# Patient Record
Sex: FEMALE | Race: BLACK OR AFRICAN AMERICAN | Employment: UNEMPLOYED | URBAN - METROPOLITAN AREA
[De-identification: names, ages, dates, MRNs, and addresses within clinical notes are randomized per-mention and may not be internally consistent; named-entity substitution may affect disease eponyms.]

---

## 2017-08-02 ENCOUNTER — HOSPITAL ENCOUNTER (EMERGENCY)
Age: 22
Discharge: HOME | End: 2017-08-03
Attending: EMERGENCY MEDICINE
Payer: COMMERCIAL

## 2017-08-02 DIAGNOSIS — N89.8 DISCHARGE FROM THE VAGINA: Primary | ICD-10-CM

## 2017-08-02 PROCEDURE — 87210 SMEAR WET MOUNT SALINE/INK: CPT | Performed by: EMERGENCY MEDICINE

## 2017-08-02 PROCEDURE — 99284 EMERGENCY DEPT VISIT MOD MDM: CPT

## 2017-08-02 PROCEDURE — 96372 THER/PROPH/DIAG INJ SC/IM: CPT

## 2017-08-02 PROCEDURE — 74011250636 HC RX REV CODE- 250/636: Performed by: EMERGENCY MEDICINE

## 2017-08-02 PROCEDURE — 74011250637 HC RX REV CODE- 250/637: Performed by: EMERGENCY MEDICINE

## 2017-08-02 PROCEDURE — 87491 CHLMYD TRACH DNA AMP PROBE: CPT | Performed by: EMERGENCY MEDICINE

## 2017-08-02 PROCEDURE — 74011000250 HC RX REV CODE- 250: Performed by: EMERGENCY MEDICINE

## 2017-08-02 RX ORDER — AZITHROMYCIN 250 MG/1
1000 TABLET, FILM COATED ORAL
Status: COMPLETED | OUTPATIENT
Start: 2017-08-02 | End: 2017-08-02

## 2017-08-02 RX ADMIN — AZITHROMYCIN 1000 MG: 250 TABLET, FILM COATED ORAL at 23:46

## 2017-08-02 RX ADMIN — LIDOCAINE HYDROCHLORIDE 1 G: 10 INJECTION, SOLUTION EPIDURAL; INFILTRATION; INTRACAUDAL; PERINEURAL at 23:46

## 2017-08-02 NOTE — LETTER
8/4/2017 [address] [id number] Dear Ms. Talbot You were seen in the Emergency Department of [address] [place] [address] on 8/2/2017 and had lab and/or radiology tests performed. = 
 
The chlamydia from your Emergency Department visit on 8/2/2017 was positive. You were treated appropriately during your visit. No further treatment is required. Your partner needs to be treated. If you have any questions please contact the Emergency Department at [phone]. Sincerely, DEMETRICE Melendez. [address] 
[address] [place] [address] EMERGENCY DEPT 
[address] [id number] [phone]

## 2017-08-03 VITALS
BODY MASS INDEX: 26.31 KG/M2 | WEIGHT: 134 LBS | HEART RATE: 64 BPM | OXYGEN SATURATION: 99 % | RESPIRATION RATE: 16 BRPM | SYSTOLIC BLOOD PRESSURE: 156 MMHG | HEIGHT: 60 IN | DIASTOLIC BLOOD PRESSURE: 87 MMHG | TEMPERATURE: 97.8 F

## 2017-08-03 LAB
CLUE CELLS VAG QL WET PREP: NORMAL
KOH PREP SPEC: NORMAL
SERVICE CMNT-IMP: NORMAL
T VAGINALIS VAG QL WET PREP: NORMAL

## 2017-08-03 NOTE — ED NOTES
Emergency Department Nursing Plan of Care       The Nursing Plan of Care is developed from the Nursing assessment and Emergency Department Attending provider initial evaluation. The plan of care may be reviewed in the ED Provider note.     The Plan of Care was developed with the following considerations:   Patient / Family readiness to learn indicated by:verbalized understanding  Persons(s) to be included in education: patient  Barriers to Learning/Limitations:No    Signed     Ruth Todd RN    8/2/2017   11:22 PM

## 2017-08-03 NOTE — ED PROVIDER NOTES
HPI Comments: Diana Sanchez is a 25 y.o. female with no PMHx on file and no reported allergies or daily medications other than birth control who presents ambulatory to the ED c/o gradual onset of vaginal discharge, vaginal bleeding, and vaginal pain x 2 days. Pt reports a hx of chlamydia but denies any hx of GC or BV. She denies any chance of pregnancy and states that her LMP was 7/18/2017. Pt specifically denies nausea, vomiting, diarrhea, fever, chills, CP, or SOB. Social hx: + Tobacco use (0.25 ppd), - EtOH use, - Illicit drug use    PCP: None    There are no other complaints, changes or physical findings at this time. The history is provided by the patient. No  was used. Past Medical History:   Diagnosis Date    Chlamydia        History reviewed. No pertinent surgical history. History reviewed. No pertinent family history. Social History     Social History    Marital status: SINGLE     Spouse name: N/A    Number of children: N/A    Years of education: N/A     Occupational History    Not on file. Social History Main Topics    Smoking status: Current Every Day Smoker     Packs/day: 0.25    Smokeless tobacco: Never Used    Alcohol use No    Drug use: No    Sexual activity: Yes     Partners: Male     Birth control/ protection: IUD     Other Topics Concern    Not on file     Social History Narrative         ALLERGIES: Review of patient's allergies indicates no known allergies. Review of Systems   Constitutional: Negative for chills, diaphoresis, fever and unexpected weight change. HENT: Negative for congestion and sore throat. Eyes: Negative for discharge and visual disturbance. Respiratory: Negative for cough and shortness of breath. Cardiovascular: Negative for chest pain. Gastrointestinal: Negative for abdominal pain, diarrhea, nausea and vomiting. Endocrine: Negative for polydipsia, polyphagia and polyuria.    Genitourinary: Positive for vaginal bleeding, vaginal discharge and vaginal pain. Negative for dysuria and frequency. Musculoskeletal: Negative for arthralgias and neck pain. Skin: Negative for rash and wound. Allergic/Immunologic: Negative for environmental allergies, food allergies and immunocompromised state. Neurological: Negative for seizures, syncope and headaches. Hematological: Negative for adenopathy. Does not bruise/bleed easily. Psychiatric/Behavioral: Negative for behavioral problems, hallucinations and sleep disturbance. Vitals:    08/02/17 2315 08/03/17 0016   BP: (!) 165/117 156/87   Pulse: 67 64   Resp: 16    Temp: 97.8 °F (36.6 °C)    SpO2: 99%    Weight: 60.8 kg (134 lb)    Height: 5' (1.524 m)             Physical Exam   Constitutional: She is oriented to person, place, and time. She appears well-nourished. No distress. HENT:   Head: Normocephalic and atraumatic. Mouth/Throat: Oropharynx is clear and moist.   Eyes: Conjunctivae are normal. Right eye exhibits no discharge. Left eye exhibits no discharge. Neck: Neck supple. No tracheal deviation present. Cardiovascular: Normal rate and regular rhythm. Exam reveals no gallop and no friction rub. No murmur heard. Pulmonary/Chest: Breath sounds normal. No respiratory distress. She has no wheezes. She has no rales. Abdominal: Soft. Bowel sounds are normal. She exhibits no distension. There is no tenderness. Genitourinary:   Genitourinary Comments: Pelvic Exam Findings: No external lesions. Mild CMT. No adnexal mass or adnexal tenderness. Musculoskeletal: She exhibits no edema or tenderness. Lymphadenopathy:     She has no cervical adenopathy. Neurological: She is alert and oriented to person, place, and time. Skin: Skin is warm. No rash noted. She is not diaphoretic. Psychiatric: She has a normal mood and affect.  Thought content normal.        MDM  Number of Diagnoses or Management Options  Diagnosis management comments: DDx: JORDYN chlamydia, trichomonas, BV, yeast infection       Amount and/or Complexity of Data Reviewed  Clinical lab tests: ordered and reviewed  Review and summarize past medical records: yes    Patient Progress  Patient progress: stable    ED Course       Procedures    Progress Note:  11:12 PM  Pt accepts empiric STD treatment at this time. Written by Olvin Kearney ED Scribe, as dictated by Ivan Mayes MD.    Procedure Note - Pelvic Exam:    11:30 PM  Performed by: Ivan Mayes MD  Chaperoned by: Mark Brown RN  Pelvic exam was performed using bimanual and speculum. Further findings noted in physical exam.   The procedure took 1-15 minutes, and pt tolerated well. Written by Dee Dee Salcedo ED Scribe, as dictated by Ivan Mayes MD.    LABORATORY TESTS:  Recent Results (from the past 12 hour(s))   WET PREP    Collection Time: 08/02/17 11:53 PM   Result Value Ref Range    Clue cells CLUE CELLS ABSENT      Wet prep NO TRICHOMONAS SEEN     KOH, OTHER SOURCES    Collection Time: 08/02/17 11:53 PM   Result Value Ref Range    Special Requests: NO SPECIAL REQUESTS      KOH NO YEAST SEEN         MEDICATIONS GIVEN:  Medications   cefTRIAXone (ROCEPHIN) 1 g in lidocaine (PF) (XYLOCAINE) 10 mg/mL (1 %) IM injection (1 g IntraMUSCular Given 8/2/17 8206)   azithromycin (ZITHROMAX) tablet 1,000 mg (1,000 mg Oral Given 8/2/17 2346)       IMPRESSION:  1. Discharge from the vagina        PLAN:  1. Discharge Medication List as of 8/3/2017 12:33 AM        2. Follow-up Information     None        Return to ED if worse     DISCHARGE NOTE  12:35 AM  The patient has been re-evaluated and is ready for discharge. Reviewed available results with patient. Counseled pt on diagnosis and care plan. Pt has expressed understanding, and all questions have been answered. Pt agrees with plan and agrees to F/U as recommended, or return to the ED if their sxs worsen.  Discharge instructions have been provided and explained to the pt, along with reasons to return to the ED. This note is prepared by Lorrie Munroe, acting as Scribe for Naty Galaviz MD.    Naty Galaviz MD: The scribe's documentation has been prepared under my direction and personally reviewed by me in its entirety. I confirm that the note above accurately reflects all work, treatment, procedures, and medical decision making performed by me.

## 2017-08-03 NOTE — ED TRIAGE NOTES
Pt arrived to ED with c/o vaginal discharge X a few days. Pt reports a hx of chlamydia and was treated. Pt is alert and orientated X 4; skin is intact; lungs are clear; pt breaths well on room air; Pt is in no acute distress. Will continue to monitor. See nursing assessment. Safety precautions in place; call light within reach.

## 2017-08-03 NOTE — ED NOTES
Patient given copy of dc instructions and 0 script(s). Patient  verbalized understanding of instructions and script (s). Patient given a current medication reconciliation form and verbalized understanding of their medications. Patient verbalized understanding of the importance of discussing medications with  his or her physician or clinic they will be following up with. Patient alert and oriented and in no acute distress. Patient discharged home ambulatory.

## 2017-08-04 LAB
C TRACH DNA SPEC QL NAA+PROBE: POSITIVE
N GONORRHOEA DNA SPEC QL NAA+PROBE: NEGATIVE
SAMPLE TYPE: ABNORMAL
SERVICE CMNT-IMP: ABNORMAL
SPECIMEN SOURCE: ABNORMAL

## 2017-09-03 ENCOUNTER — HOSPITAL ENCOUNTER (EMERGENCY)
Age: 22
Discharge: HOME | End: 2017-09-03
Attending: EMERGENCY MEDICINE | Admitting: EMERGENCY MEDICINE
Payer: COMMERCIAL

## 2017-09-03 VITALS
BODY MASS INDEX: 26.31 KG/M2 | TEMPERATURE: 98.2 F | DIASTOLIC BLOOD PRESSURE: 84 MMHG | SYSTOLIC BLOOD PRESSURE: 124 MMHG | HEART RATE: 81 BPM | HEIGHT: 60 IN | WEIGHT: 134 LBS | RESPIRATION RATE: 14 BRPM | OXYGEN SATURATION: 99 %

## 2017-09-03 DIAGNOSIS — T78.40XA ALLERGIC REACTION, INITIAL ENCOUNTER: Primary | ICD-10-CM
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
## 2017-09-03 PROCEDURE — 99283 EMERGENCY DEPT VISIT LOW MDM: CPT

## 2017-09-03 RX ORDER — HYDROCORTISONE 1 %
CREAM (GRAM) TOPICAL 2 TIMES DAILY
Qty: 30 G | Refills: 0 | Status: SHIPPED | OUTPATIENT
Start: 2017-09-03 | End: 2018-11-16

## 2017-09-03 RX ORDER — DIPHENHYDRAMINE HCL 25 MG
25 CAPSULE ORAL
Qty: 20 CAP | Refills: 0 | Status: SHIPPED | OUTPATIENT
Start: 2017-09-03 | End: 2017-09-13

## 2017-09-03 NOTE — DISCHARGE INSTRUCTIONS
Allergic Reaction: Care Instructions  Your Care Instructions  An allergic reaction is an excessive response from your immune system to a medicine, chemical, food, insect bite, or other substance. A reaction can range from mild to life-threatening. Some people have a mild rash, hives, and itching or stomach cramps. In severe reactions, swelling of your tongue and throat can close up your airway so that you cannot breathe. Follow-up care is a key part of your treatment and safety. Be sure to make and go to all appointments, and call your doctor if you are having problems. It's also a good idea to know your test results and keep a list of the medicines you take. How can you care for yourself at home? · If you know what caused your allergic reaction, be sure to avoid it. Your allergy may become more severe each time you have a reaction. · Take an over-the-counter antihistamine, such as cetirizine (Zyrtec) or loratadine (Claritin), to treat mild symptoms. Read and follow directions on the label. Some antihistamines can make you feel sleepy. Do not give antihistamines to a child unless you have checked with your doctor first. Mild symptoms include sneezing or an itchy or runny nose; an itchy mouth; a few hives or mild itching; and mild nausea or stomach discomfort. · Do not scratch hives or a rash. Put a cold, moist towel on them or take cool baths to relieve itching. Put ice packs on hives, swelling, or insect stings for 10 to 15 minutes at a time. Put a thin cloth between the ice pack and your skin. Do not take hot baths or showers. They will make the itching worse. · Your doctor may prescribe a shot of epinephrine to carry with you in case you have a severe reaction. Learn how to give yourself the shot and keep it with you at all times. Make sure it is not . · Go to the emergency room every time you have a severe reaction, even if you have used your shot of epinephrine and are feeling better. Symptoms can come back after a shot. · Wear medical alert jewelry that lists your allergies. You can buy this at most drugstores. · If your child has a severe allergy, make sure that his or her teachers, babysitters, coaches, and other caregivers know about the allergy. They should have an epinephrine shot, know how and when to give it, and have a plan to take your child to the hospital.  When should you call for help? Give an epinephrine shot if:  · You think you are having a severe allergic reaction. · You have symptoms in more than one body area, such as mild nausea and an itchy mouth. After giving an epinephrine shot call 911, even if you feel better. Call 911 if:  · You have symptoms of a severe allergic reaction. These may include:  ¨ Sudden raised, red areas (hives) all over your body. ¨ Swelling of the throat, mouth, lips, or tongue. ¨ Trouble breathing. ¨ Passing out (losing consciousness). Or you may feel very lightheaded or suddenly feel weak, confused, or restless. · You have been given an epinephrine shot, even if you feel better. Call your doctor now or seek immediate medical care if:  · You have symptoms of an allergic reaction, such as:  ¨ A rash or hives (raised, red areas on the skin). ¨ Itching. ¨ Swelling. ¨ Belly pain, nausea, or vomiting. Watch closely for changes in your health, and be sure to contact your doctor if:  · You do not get better as expected. Where can you learn more? Go to http://byron-isai.info/. Enter A175 in the search box to learn more about \"Allergic Reaction: Care Instructions. \"  Current as of: April 3, 2017  Content Version: 11.3  © 1699-3752 [employer]. Care instructions adapted under license by [employer] (which disclaims liability or warranty for this information).  If you have questions about a medical condition or this instruction, always ask your healthcare professional. [address]  any warranty or liability for your use of this information. Insect Stings and Bites: Care Instructions  Your Care Instructions  Stings and bites from bees, wasps, ants, and other insects often cause pain, swelling, redness, and itching. In some people, especially children, the redness and swelling may be worse. It may extend several inches beyond the affected area. But in most cases, stings and bites don't cause reactions all over the body. If you have had a reaction to an insect sting or bite, you are at risk for a reaction if you get stung or bitten again. Follow-up care is a key part of your treatment and safety. Be sure to make and go to all appointments, and call your doctor if you are having problems. It's also a good idea to know your test results and keep a list of the medicines you take. How can you care for yourself at home? · Do not scratch or rub the skin where the sting or bite occurred. · Put a cold pack or ice cube on the area. Put a thin cloth between the ice and your skin. For some people, a paste of baking soda mixed with a little water helps relieve pain and decrease the reaction. · Take an over-the-counter antihistamine, such as diphenhydramine (Benadryl) or loratadine (Claritin), to relieve swelling, redness, and itching. Calamine lotion or hydrocortisone cream may also help. Do not give antihistamines to your child unless you have checked with the doctor first.  · Be safe with medicines. If your doctor prescribed medicine for your allergy, take it exactly as prescribed. Call your doctor if you think you are having a problem with your medicine. You will get more details on the specific medicines your doctor prescribes. · Your doctor may prescribe a shot of epinephrine to carry with you in case you have a severe reaction. Learn how and when to give yourself the shot, and keep it with you at all times. Make sure it has not .   · Go to the emergency room anytime you have a severe reaction. Go even if you have given yourself epinephrine and are feeling better. Symptoms can come back. When should you call for help? Call 911 anytime you think you may need emergency care. For example, call if:  · You have symptoms of a severe allergic reaction. These may include:  ¨ Sudden raised, red areas (hives) all over your body. ¨ Swelling of the throat, mouth, lips, or tongue. ¨ Trouble breathing. ¨ Passing out (losing consciousness). Or you may feel very lightheaded or suddenly feel weak, confused, or restless. Call your doctor now or seek immediate medical care if:  · You have symptoms of an allergic reaction not right at the sting or bite, such as:  ¨ A rash or small area of hives (raised, red areas on the skin). ¨ Itching. ¨ Swelling. ¨ Belly pain, nausea, or vomiting. · You have a lot of swelling around the site (such as your entire arm or leg is swollen). · You have signs of infection, such as:  ¨ Increased pain, swelling, redness, or warmth around the sting. ¨ Red streaks leading from the area. ¨ Pus draining from the sting. ¨ A fever. Watch closely for changes in your health, and be sure to contact your doctor if:  · You do not get better as expected. Where can you learn more? Go to http://byron-isai.info/. Enter P390 in the search box to learn more about \"Insect Stings and Bites: Care Instructions. \"  Current as of: March 20, 2017  Content Version: 11.3  © 0275-6721 [employer]. Care instructions adapted under license by [employer] (which disclaims liability or warranty for this information). If you have questions about a medical condition or this instruction, always ask your healthcare professional. [place] [address] any warranty or liability for your use of this information.

## 2017-09-03 NOTE — ED PROVIDER NOTES
Patient is a 25 y.o. female presenting with Insect Bite. The history is provided by the patient. No  was used. Insect Bite   This is a new problem. The current episode started less than 1 hour ago. The problem occurs constantly. The problem has not changed since onset. Pertinent negatives include no chest pain, no abdominal pain, no headaches and no shortness of breath. Nothing aggravates the symptoms. Nothing relieves the symptoms. She has tried nothing for the symptoms. Past Medical History:   Diagnosis Date    Chlamydia        History reviewed. No pertinent surgical history. History reviewed. No pertinent family history. Social History     Social History    Marital status: SINGLE     Spouse name: N/A    Number of children: N/A    Years of education: N/A     Occupational History    Not on file. Social History Main Topics    Smoking status: Current Every Day Smoker     Packs/day: 0.25    Smokeless tobacco: Never Used    Alcohol use No    Drug use: No    Sexual activity: Yes     Partners: Male     Birth control/ protection: IUD     Other Topics Concern    Not on file     Social History Narrative         ALLERGIES: Review of patient's allergies indicates no known allergies. Review of Systems   Constitutional: Negative for fatigue and fever. Respiratory: Negative for shortness of breath and wheezing. Cardiovascular: Negative for chest pain and palpitations. Gastrointestinal: Negative for abdominal pain. Musculoskeletal: Negative for arthralgias, myalgias, neck pain and neck stiffness. Skin: Positive for rash. Negative for pallor. Neurological: Negative for dizziness, tremors, weakness and headaches. Hematological: Negative for adenopathy. Psychiatric/Behavioral: Negative for agitation and behavioral problems. All other systems reviewed and are negative.       Vitals:    09/03/17 1400   BP: 124/84   Pulse: 81   Resp: 14   Temp: 98.2 °F (36.8 °C) SpO2: 99%   Weight: 60.8 kg (134 lb)   Height: 5' (1.524 m)            Physical Exam   Constitutional: She is oriented to person, place, and time. She appears well-developed and well-nourished. No distress. HENT:   Head: Normocephalic and atraumatic. Right Ear: External ear normal.   Left Ear: External ear normal.   Nose: Nose normal.   Eyes: Conjunctivae are normal.   Neck: Normal range of motion. Neck supple. Cardiovascular: Normal rate and regular rhythm. Pulmonary/Chest: Effort normal and breath sounds normal. No respiratory distress. She has no wheezes. Abdominal: Soft. Bowel sounds are normal. There is no tenderness. Musculoskeletal: Normal range of motion. Lymphadenopathy:     She has no cervical adenopathy. Neurological: She is alert and oriented to person, place, and time. No cranial nerve deficit. Coordination normal.   Skin: Skin is warm and dry. No rash noted. Psychiatric: She has a normal mood and affect. Her behavior is normal. Judgment and thought content normal.   Nursing note and vitals reviewed. MDM  Number of Diagnoses or Management Options  Allergic reaction, initial encounter:   Insect bite, initial encounter:   Diagnosis management comments: DDX insect bite dermatitis       Amount and/or Complexity of Data Reviewed  Discuss the patient with other providers: yes      ED Course       Procedures    Pt has been reevaluated. There are no new complaints, changes, or physical findings at this time. Medications have been reviewed w/ pt and/or family. Pt and/or family's questions have been answered. Pt and/or family expressed good understanding of the dx/tx/rx and is in agreement with plan of care. Pt instructed and agreed to f/u w/ PCP and to return to ED upon further deterioration. Pt is ready for discharge. LABORATORY TESTS:  No results found for this or any previous visit (from the past 12 hour(s)).     IMAGING RESULTS:  No orders to display     No results found.      MEDICATIONS GIVEN:  Medications - No data to display    IMPRESSION:  1. Allergic reaction, initial encounter    2. Insect bite, initial encounter        PLAN:  1. Discharge Medication List as of 9/3/2017  2:40 PM      START taking these medications    Details   diphenhydrAMINE (BENADRYL) 25 mg capsule Take 1 Cap by mouth every six (6) hours as needed for up to 10 days. , Normal, Disp-20 Cap, R-0      hydrocortisone (CORTAID) 1 % topical cream Apply  to affected area two (2) times a day. use thin layer, Normal, Disp-30 g, R-0         CONTINUE these medications which have NOT CHANGED    Details   cyclobenzaprine (FLEXERIL) 10 mg tablet Take 1 tablet by mouth three (3) times daily as needed for Muscle Spasm(s). , Print, Disp-12 tablet, R-0      ibuprofen (MOTRIN) 600 mg tablet Take 1 tablet by mouth every six (6) hours as needed for Pain., Print, Disp-20 tablet, R-0           2.    Follow-up Information     Follow up With Details Comments Contact Info    [facility] In 2 days  [address] [facility] [id number]              Return to ED if worse

## 2017-09-03 NOTE — ED NOTES
Patient presents to ED via EMS for c/o left inner thigh pain after being stung by unknown insect. No redness or induration noted to area of pain. No hx of anaphylaxis to bee stings or wasps. Skin warm and dry to touch. Emergency Department Nursing Plan of Care       The Nursing Plan of Care is developed from the Nursing assessment and Emergency Department Attending provider initial evaluation. The plan of care may be reviewed in the ED Provider note.     The Plan of Care was developed with the following considerations:   Patient / Family readiness to learn indicated by:verbalized understanding  Persons(s) to be included in education: patient  Barriers to Learning/Limitations:No    Signed     Maame Pinto RN    9/3/2017   2:39 PM

## 2017-09-20 ENCOUNTER — HOSPITAL ENCOUNTER (EMERGENCY)
Age: 22
Discharge: HOME | End: 2017-09-21
Attending: EMERGENCY MEDICINE
Payer: COMMERCIAL

## 2017-09-20 DIAGNOSIS — R11.2 NON-INTRACTABLE VOMITING WITH NAUSEA, UNSPECIFIED VOMITING TYPE: Primary | ICD-10-CM

## 2017-09-20 DIAGNOSIS — N30.00 ACUTE CYSTITIS WITHOUT HEMATURIA: ICD-10-CM

## 2017-09-20 LAB
ANION GAP SERPL CALC-SCNC: 12 MMOL/L (ref 5–15)
BASOPHILS # BLD: 0 K/UL (ref 0–0.1)
BASOPHILS NFR BLD: 0 % (ref 0–1)
BUN SERPL-MCNC: 10 MG/DL (ref 6–20)
BUN/CREAT SERPL: 14 (ref 12–20)
CALCIUM SERPL-MCNC: 10 MG/DL (ref 8.5–10.1)
CHLORIDE SERPL-SCNC: 105 MMOL/L (ref 97–108)
CO2 SERPL-SCNC: 26 MMOL/L (ref 21–32)
CREAT SERPL-MCNC: 0.72 MG/DL (ref 0.55–1.02)
EOSINOPHIL # BLD: 0.1 K/UL (ref 0–0.4)
EOSINOPHIL NFR BLD: 1 % (ref 0–7)
ERYTHROCYTE [DISTWIDTH] IN BLOOD BY AUTOMATED COUNT: 12.5 % (ref 11.5–14.5)
GLUCOSE SERPL-MCNC: 87 MG/DL (ref 65–100)
HCG SERPL QL: NEGATIVE
HCT VFR BLD AUTO: 42.2 % (ref 35–47)
HGB BLD-MCNC: 13.9 G/DL (ref 11.5–16)
LYMPHOCYTES # BLD: 2 K/UL (ref 0.8–3.5)
LYMPHOCYTES NFR BLD: 23 % (ref 12–49)
MCH RBC QN AUTO: 29.4 PG (ref 26–34)
MCHC RBC AUTO-ENTMCNC: 32.9 G/DL (ref 30–36.5)
MCV RBC AUTO: 89.4 FL (ref 80–99)
MONOCYTES # BLD: 0.7 K/UL (ref 0–1)
MONOCYTES NFR BLD: 9 % (ref 5–13)
NEUTS SEG # BLD: 5.8 K/UL (ref 1.8–8)
NEUTS SEG NFR BLD: 67 % (ref 32–75)
PLATELET # BLD AUTO: 286 K/UL (ref 150–400)
POTASSIUM SERPL-SCNC: 3.4 MMOL/L (ref 3.5–5.1)
RBC # BLD AUTO: 4.72 M/UL (ref 3.8–5.2)
SODIUM SERPL-SCNC: 143 MMOL/L (ref 136–145)
WBC # BLD AUTO: 8.6 K/UL (ref 3.6–11)

## 2017-09-20 PROCEDURE — 81001 URINALYSIS AUTO W/SCOPE: CPT | Performed by: EMERGENCY MEDICINE

## 2017-09-20 PROCEDURE — 96361 HYDRATE IV INFUSION ADD-ON: CPT

## 2017-09-20 PROCEDURE — 74011250636 HC RX REV CODE- 250/636: Performed by: EMERGENCY MEDICINE

## 2017-09-20 PROCEDURE — 80307 DRUG TEST PRSMV CHEM ANLYZR: CPT | Performed by: EMERGENCY MEDICINE

## 2017-09-20 PROCEDURE — 87147 CULTURE TYPE IMMUNOLOGIC: CPT | Performed by: EMERGENCY MEDICINE

## 2017-09-20 PROCEDURE — 36415 COLL VENOUS BLD VENIPUNCTURE: CPT | Performed by: EMERGENCY MEDICINE

## 2017-09-20 PROCEDURE — 85025 COMPLETE CBC W/AUTO DIFF WBC: CPT | Performed by: EMERGENCY MEDICINE

## 2017-09-20 PROCEDURE — 96374 THER/PROPH/DIAG INJ IV PUSH: CPT

## 2017-09-20 PROCEDURE — 84703 CHORIONIC GONADOTROPIN ASSAY: CPT | Performed by: EMERGENCY MEDICINE

## 2017-09-20 PROCEDURE — 87086 URINE CULTURE/COLONY COUNT: CPT | Performed by: EMERGENCY MEDICINE

## 2017-09-20 PROCEDURE — 99284 EMERGENCY DEPT VISIT MOD MDM: CPT

## 2017-09-20 PROCEDURE — 80048 BASIC METABOLIC PNL TOTAL CA: CPT | Performed by: EMERGENCY MEDICINE

## 2017-09-20 RX ORDER — SODIUM CHLORIDE 0.9 % (FLUSH) 0.9 %
5-10 SYRINGE (ML) INJECTION EVERY 8 HOURS
Status: DISCONTINUED | OUTPATIENT
Start: 2017-09-20 | End: 2017-09-21 | Stop reason: HOSPADM

## 2017-09-20 RX ORDER — SODIUM CHLORIDE 0.9 % (FLUSH) 0.9 %
5-10 SYRINGE (ML) INJECTION AS NEEDED
Status: DISCONTINUED | OUTPATIENT
Start: 2017-09-20 | End: 2017-09-21 | Stop reason: HOSPADM

## 2017-09-20 RX ORDER — ONDANSETRON 2 MG/ML
4 INJECTION INTRAMUSCULAR; INTRAVENOUS
Status: COMPLETED | OUTPATIENT
Start: 2017-09-20 | End: 2017-09-20

## 2017-09-20 RX ADMIN — ONDANSETRON 4 MG: 2 SOLUTION INTRAMUSCULAR; INTRAVENOUS at 22:38

## 2017-09-20 RX ADMIN — SODIUM CHLORIDE 1000 ML: 900 INJECTION, SOLUTION INTRAVENOUS at 22:38

## 2017-09-21 VITALS
SYSTOLIC BLOOD PRESSURE: 133 MMHG | RESPIRATION RATE: 16 BRPM | HEART RATE: 84 BPM | TEMPERATURE: 98.6 F | BODY MASS INDEX: 24.15 KG/M2 | WEIGHT: 123 LBS | HEIGHT: 60 IN | DIASTOLIC BLOOD PRESSURE: 71 MMHG | OXYGEN SATURATION: 100 %

## 2017-09-21 LAB
AMPHET UR QL SCN: NEGATIVE
APPEARANCE UR: ABNORMAL
BACTERIA URNS QL MICRO: ABNORMAL /HPF
BARBITURATES UR QL SCN: NEGATIVE
BENZODIAZ UR QL: NEGATIVE
BILIRUB UR QL: NEGATIVE
CANNABINOIDS UR QL SCN: NEGATIVE
CAOX CRY URNS QL MICRO: ABNORMAL
COCAINE UR QL SCN: NEGATIVE
COLOR UR: ABNORMAL
DRUG SCRN COMMENT,DRGCM: NORMAL
EPITH CASTS URNS QL MICRO: ABNORMAL /LPF
GLUCOSE UR STRIP.AUTO-MCNC: NEGATIVE MG/DL
HGB UR QL STRIP: NEGATIVE
KETONES UR QL STRIP.AUTO: 15 MG/DL
LEUKOCYTE ESTERASE UR QL STRIP.AUTO: ABNORMAL
METHADONE UR QL: NEGATIVE
NITRITE UR QL STRIP.AUTO: NEGATIVE
OPIATES UR QL: NEGATIVE
PCP UR QL: NEGATIVE
PH UR STRIP: 5.5 [PH] (ref 5–8)
PROT UR STRIP-MCNC: ABNORMAL MG/DL
RBC #/AREA URNS HPF: ABNORMAL /HPF (ref 0–5)
SP GR UR REFRACTOMETRY: >1.03 (ref 1–1.03)
UA: UC IF INDICATED,UAUC: ABNORMAL
UROBILINOGEN UR QL STRIP.AUTO: 0.2 EU/DL (ref 0.2–1)
WBC URNS QL MICRO: ABNORMAL /HPF (ref 0–4)

## 2017-09-21 RX ORDER — ONDANSETRON 4 MG/1
4 TABLET, ORALLY DISINTEGRATING ORAL
Qty: 10 TAB | Refills: 0 | Status: SHIPPED | OUTPATIENT
Start: 2017-09-21 | End: 2018-11-16

## 2017-09-21 RX ORDER — CIPROFLOXACIN 500 MG/1
500 TABLET ORAL 2 TIMES DAILY
Qty: 10 TAB | Refills: 0 | Status: SHIPPED | OUTPATIENT
Start: 2017-09-21 | End: 2017-09-26

## 2017-09-21 NOTE — ED PROVIDER NOTES
HPI Comments: Margaret John is a 25 y.o. female with PMhx significant for depression who presents via EMS to the ED with cc of persistent abdominal pain which started 4 days ago. Pt also c/o nausea, vomiting, and diarrhea for the past four days. Per EMS, patient has not had fluids in 3 days. They were unable to start a line because her veins were flat secondary to dehydration. She denies taking any antibiotics recently. Pt explains that she took her depression medication four days ago and her stomach started to hurt. She denies taking her medications on an empty stomach. EMS also explains that she had a near syncopal episode with an episode of vomiting, but she did not lose consciousness. Pt denies fever, chills, vaginal bleeding, vaginal discharge, or difficulty urinating. She does not have regular periods due to her IUD. Social Hx: - Tobacco, - EtOH, - Illicit Drugs    PCP: None    There are no other complaints, changes or physical findings at this time. The history is provided by the patient and the EMS personnel. Past Medical History:   Diagnosis Date    Chlamydia        History reviewed. No pertinent surgical history. History reviewed. No pertinent family history. Social History     Social History    Marital status: SINGLE     Spouse name: N/A    Number of children: N/A    Years of education: N/A     Occupational History    Not on file. Social History Main Topics    Smoking status: Current Every Day Smoker     Packs/day: 0.25    Smokeless tobacco: Never Used    Alcohol use No    Drug use: No    Sexual activity: Yes     Partners: Male     Birth control/ protection: IUD     Other Topics Concern    Not on file     Social History Narrative         ALLERGIES: Review of patient's allergies indicates no known allergies. Review of Systems   Constitutional: Negative for chills and fever. HENT: Negative for sore throat. Eyes: Negative for photophobia and redness. Respiratory: Negative for shortness of breath and wheezing. Cardiovascular: Negative for chest pain and leg swelling. Gastrointestinal: Positive for abdominal pain, diarrhea, nausea and vomiting. Negative for blood in stool. Genitourinary: Negative for difficulty urinating, dysuria, hematuria, menstrual problem, vaginal bleeding and vaginal discharge. Musculoskeletal: Negative for back pain and joint swelling. Neurological: Negative for dizziness, seizures, syncope, speech difficulty, weakness, numbness and headaches. Hematological: Negative for adenopathy. Psychiatric/Behavioral: Negative for agitation, confusion and suicidal ideas. The patient is not nervous/anxious. Vitals:    09/20/17 2202   BP: 135/89   Pulse: 84   Resp: 16   Temp: 98.6 °F (37 °C)   SpO2: 100%   Weight: 55.8 kg (123 lb)   Height: 5' (1.524 m)            Physical Exam   Constitutional: She is oriented to person, place, and time. She appears well-developed and well-nourished. No distress. HENT:   Head: Normocephalic and atraumatic. Mouth/Throat: Oropharynx is clear and moist. No oropharyngeal exudate. Eyes: Conjunctivae and EOM are normal. Pupils are equal, round, and reactive to light. Left eye exhibits no discharge. Neck: Normal range of motion. Neck supple. No JVD present. Cardiovascular: Normal rate, regular rhythm, normal heart sounds and intact distal pulses. Pulmonary/Chest: Effort normal and breath sounds normal. No respiratory distress. She has no wheezes. Abdominal: Soft. Bowel sounds are normal. She exhibits no distension. There is no tenderness. There is no rebound and no guarding. Abdomen exam is benign. Musculoskeletal: Normal range of motion. She exhibits no edema or tenderness. Lymphadenopathy:     She has no cervical adenopathy. Neurological: She is alert and oriented to person, place, and time. She has normal reflexes. No cranial nerve deficit. Skin: Skin is warm and dry.  No rash noted.   Psychiatric: She has a normal mood and affect. Her behavior is normal.   Nursing note and vitals reviewed. MDM  Number of Diagnoses or Management Options  Diagnosis management comments:   DDx: gastroenteritis, dehydration, UTI       Amount and/or Complexity of Data Reviewed  Clinical lab tests: ordered and reviewed  Obtain history from someone other than the patient: yes (EMS  )  Review and summarize past medical records: yes    Patient Progress  Patient progress: stable    ED Course       Procedures     PROGRESS NOTE:  11:00 PM  Pt has been re-evaluated. She states she is feeling better. LABORATORY TESTS:  Recent Results (from the past 12 hour(s))   CBC WITH AUTOMATED DIFF    Collection Time: 09/20/17 10:24 PM   Result Value Ref Range    WBC 8.6 3.6 - 11.0 K/uL    RBC 4.72 3.80 - 5.20 M/uL    HGB 13.9 11.5 - 16.0 g/dL    HCT 42.2 35.0 - 47.0 %    MCV 89.4 80.0 - 99.0 FL    MCH 29.4 26.0 - 34.0 PG    MCHC 32.9 30.0 - 36.5 g/dL    RDW 12.5 11.5 - 14.5 %    PLATELET 002 287 - 994 K/uL    NEUTROPHILS 67 32 - 75 %    LYMPHOCYTES 23 12 - 49 %    MONOCYTES 9 5 - 13 %    EOSINOPHILS 1 0 - 7 %    BASOPHILS 0 0 - 1 %    ABS. NEUTROPHILS 5.8 1.8 - 8.0 K/UL    ABS. LYMPHOCYTES 2.0 0.8 - 3.5 K/UL    ABS. MONOCYTES 0.7 0.0 - 1.0 K/UL    ABS. EOSINOPHILS 0.1 0.0 - 0.4 K/UL    ABS.  BASOPHILS 0.0 0.0 - 0.1 K/UL   METABOLIC PANEL, BASIC    Collection Time: 09/20/17 10:24 PM   Result Value Ref Range    Sodium 143 136 - 145 mmol/L    Potassium 3.4 (L) 3.5 - 5.1 mmol/L    Chloride 105 97 - 108 mmol/L    CO2 26 21 - 32 mmol/L    Anion gap 12 5 - 15 mmol/L    Glucose 87 65 - 100 mg/dL    BUN 10 6 - 20 MG/DL    Creatinine 0.72 0.55 - 1.02 MG/DL    BUN/Creatinine ratio 14 12 - 20      GFR est AA >60 >60 ml/min/1.73m2    GFR est non-AA >60 >60 ml/min/1.73m2    Calcium 10.0 8.5 - 10.1 MG/DL   HCG QL SERUM    Collection Time: 09/20/17 10:24 PM   Result Value Ref Range    HCG, Ql. NEGATIVE  NEG     URINALYSIS W/ REFLEX CULTURE    Collection Time: 09/20/17 11:18 PM   Result Value Ref Range    Color YELLOW/STRAW      Appearance CLOUDY (A) CLEAR      Specific gravity >1.030 (H) 1.003 - 1.030    pH (UA) 5.5 5.0 - 8.0      Protein TRACE (A) NEG mg/dL    Glucose NEGATIVE  NEG mg/dL    Ketone 15 (A) NEG mg/dL    Bilirubin NEGATIVE  NEG      Blood NEGATIVE  NEG      Urobilinogen 0.2 0.2 - 1.0 EU/dL    Nitrites NEGATIVE  NEG      Leukocyte Esterase SMALL (A) NEG      WBC 5-10 0 - 4 /hpf    RBC 0-5 0 - 5 /hpf    Epithelial cells FEW FEW /lpf    Bacteria 1+ (A) NEG /hpf    UA:UC IF INDICATED URINE CULTURE ORDERED (A) CNI      CA Oxalate crystals FEW (A) NEG     DRUG SCREEN, URINE    Collection Time: 09/20/17 11:18 PM   Result Value Ref Range    AMPHETAMINES NEGATIVE  NEG      BARBITURATES NEGATIVE  NEG      BENZODIAZEPINE NEGATIVE  NEG      COCAINE NEGATIVE  NEG      METHADONE NEGATIVE  NEG      OPIATES NEGATIVE  NEG      PCP(PHENCYCLIDINE) NEGATIVE  NEG      THC (TH-CANNABINOL) NEGATIVE  NEG      Drug screen comment (NOTE)        MEDICATIONS GIVEN:  Medications   sodium chloride (NS) flush 5-10 mL (not administered)   sodium chloride (NS) flush 5-10 mL (not administered)   ondansetron (ZOFRAN) injection 4 mg (4 mg IntraVENous Given 9/20/17 2238)   sodium chloride 0.9 % bolus infusion 1,000 mL (1,000 mL IntraVENous New Bag 9/20/17 2238)       IMPRESSION:  1. Non-intractable vomiting with nausea, unspecified vomiting type    2. Acute cystitis without hematuria        PLAN:  1. Current Discharge Medication List      START taking these medications    Details   ondansetron (ZOFRAN ODT) 4 mg disintegrating tablet Take 1 Tab by mouth every eight (8) hours as needed for Nausea. Qty: 10 Tab, Refills: 0      ciprofloxacin HCl (CIPRO) 500 mg tablet Take 1 Tab by mouth two (2) times a day for 5 days. Qty: 10 Tab, Refills: 0           2.    Follow-up Information     Follow up With Details Comments Contact Info    [facility] EMERGENCY DEPT In 1 week  [address] [facility]  [phone]        Return to ED if worse     DISCHARGE NOTE  12:07 AM  The patient has been re-evaluated and is ready for discharge. Reviewed available results with patient. Counseled patient on diagnosis and care plan. Patient has expressed understanding, and all questions have been answered. Patient agrees with plan and agrees to follow up as recommended, or return to the ED if their symptoms worsen. Discharge instructions have been provided and explained to the patient, along with reasons to return to the ED. Attestation Note:  This note is prepared by DOREEN German, acting as Scribe for MD Daniel Finney MD: The scribe's documentation has been prepared under my direction and personally reviewed by me in its entirety. I confirm that the note above accurately reflects all work, treatment, procedures, and medical decision making performed by me.

## 2017-09-21 NOTE — DISCHARGE INSTRUCTIONS
Nausea and Vomiting: Care Instructions  Your Care Instructions    When you are nauseated, you may feel weak and sweaty and notice a lot of saliva in your mouth. Nausea often leads to vomiting. Most of the time you do not need to worry about nausea and vomiting, but they can be signs of other illnesses. Two common causes of nausea and vomiting are stomach flu and food poisoning. Nausea and vomiting from viral stomach flu will usually start to improve within 24 hours. Nausea and vomiting from food poisoning may last from 12 to 48 hours. The doctor has checked you carefully, but problems can develop later. If you notice any problems or new symptoms, get medical treatment right away. Follow-up care is a key part of your treatment and safety. Be sure to make and go to all appointments, and call your doctor if you are having problems. It's also a good idea to know your test results and keep a list of the medicines you take. How can you care for yourself at home? · To prevent dehydration, drink plenty of fluids, enough so that your urine is light yellow or clear like water. Choose water and other caffeine-free clear liquids until you feel better. If you have kidney, heart, or liver disease and have to limit fluids, talk with your doctor before you increase the amount of fluids you drink. · Rest in bed until you feel better. · When you are able to eat, try clear soups, mild foods, and liquids until all symptoms are gone for 12 to 48 hours. Other good choices include dry toast, crackers, cooked cereal, and gelatin dessert, such as Jell-O. When should you call for help? Call 911 anytime you think you may need emergency care. For example, call if:  · You passed out (lost consciousness). Call your doctor now or seek immediate medical care if:  · You have symptoms of dehydration, such as:  ¨ Dry eyes and a dry mouth. ¨ Passing only a little dark urine.   ¨ Feeling thirstier than usual.  · You have new or worsening belly pain. · You have a new or higher fever. · You vomit blood or what looks like coffee grounds. Watch closely for changes in your health, and be sure to contact your doctor if:  · You have ongoing nausea and vomiting. · Your vomiting is getting worse. · Your vomiting lasts longer than 2 days. · You are not getting better as expected. Where can you learn more? Go to http://byron-isai.info/. Enter 25 294674 in the search box to learn more about \"Nausea and Vomiting: Care Instructions. \"  Current as of: March 20, 2017  Content Version: 11.3  © 4108-7025 [employer]. Care instructions adapted under license by [employer] (which disclaims liability or warranty for this information). If you have questions about a medical condition or this instruction, always ask your healthcare professional. [address] any warranty or liability for your use of this information.

## 2017-09-21 NOTE — ED NOTES
Pt resting in bed and appears to be in no distress, pt awaiting results, will continue to monitor pt.

## 2017-09-21 NOTE — ED NOTES
Bedside and Verbal shift change report given to Reyna Aguilera RN (oncoming nurse) by Jackelin Zambrano RN (offgoing nurse). Report included the following information SBAR, ED Summary, MAR and Recent Results.

## 2017-09-22 LAB
BACTERIA SPEC CULT: NORMAL
CC UR VC: NORMAL
SERVICE CMNT-IMP: NORMAL

## 2018-04-27 ENCOUNTER — HOSPITAL ENCOUNTER (EMERGENCY)
Age: 23
Discharge: HOME | End: 2018-04-27
Attending: EMERGENCY MEDICINE | Admitting: EMERGENCY MEDICINE
Payer: COMMERCIAL

## 2018-04-27 VITALS
HEART RATE: 82 BPM | WEIGHT: 137.13 LBS | RESPIRATION RATE: 16 BRPM | TEMPERATURE: 98.2 F | HEIGHT: 60 IN | DIASTOLIC BLOOD PRESSURE: 75 MMHG | BODY MASS INDEX: 26.92 KG/M2 | OXYGEN SATURATION: 98 % | SYSTOLIC BLOOD PRESSURE: 134 MMHG

## 2018-04-27 DIAGNOSIS — A59.9 TRICHIMONIASIS: Primary | ICD-10-CM

## 2018-04-27 LAB
APPEARANCE UR: CLEAR
BACTERIA URNS QL MICRO: NEGATIVE /HPF
BILIRUB UR QL: NEGATIVE
CLUE CELLS VAG QL WET PREP: NORMAL
COLOR UR: NORMAL
EPITH CASTS URNS QL MICRO: NORMAL /LPF
GLUCOSE UR STRIP.AUTO-MCNC: NEGATIVE MG/DL
HCG UR QL: NEGATIVE
HGB UR QL STRIP: NEGATIVE
KETONES UR QL STRIP.AUTO: NEGATIVE MG/DL
KOH PREP SPEC: NORMAL
LEUKOCYTE ESTERASE UR QL STRIP.AUTO: NEGATIVE
NITRITE UR QL STRIP.AUTO: NEGATIVE
PH UR STRIP: 6 [PH] (ref 5–8)
PROT UR STRIP-MCNC: NEGATIVE MG/DL
RBC #/AREA URNS HPF: NORMAL /HPF (ref 0–5)
SERVICE CMNT-IMP: NORMAL
SP GR UR REFRACTOMETRY: 1.02 (ref 1–1.03)
T VAGINALIS VAG QL WET PREP: NORMAL
UA: UC IF INDICATED,UAUC: NORMAL
UROBILINOGEN UR QL STRIP.AUTO: 0.2 EU/DL (ref 0.2–1)
WBC URNS QL MICRO: NORMAL /HPF (ref 0–4)

## 2018-04-27 PROCEDURE — 81025 URINE PREGNANCY TEST: CPT

## 2018-04-27 PROCEDURE — 81001 URINALYSIS AUTO W/SCOPE: CPT | Performed by: EMERGENCY MEDICINE

## 2018-04-27 PROCEDURE — 74011250637 HC RX REV CODE- 250/637: Performed by: PHYSICIAN ASSISTANT

## 2018-04-27 PROCEDURE — 99283 EMERGENCY DEPT VISIT LOW MDM: CPT

## 2018-04-27 PROCEDURE — 87210 SMEAR WET MOUNT SALINE/INK: CPT | Performed by: PHYSICIAN ASSISTANT

## 2018-04-27 PROCEDURE — 87491 CHLMYD TRACH DNA AMP PROBE: CPT | Performed by: PHYSICIAN ASSISTANT

## 2018-04-27 PROCEDURE — 74011000250 HC RX REV CODE- 250: Performed by: PHYSICIAN ASSISTANT

## 2018-04-27 PROCEDURE — 74011250636 HC RX REV CODE- 250/636: Performed by: PHYSICIAN ASSISTANT

## 2018-04-27 PROCEDURE — 96372 THER/PROPH/DIAG INJ SC/IM: CPT

## 2018-04-27 RX ORDER — METRONIDAZOLE 500 MG/1
2000 TABLET ORAL
Qty: 4 TAB | Refills: 0 | Status: SHIPPED | OUTPATIENT
Start: 2018-04-27 | End: 2018-04-27

## 2018-04-27 RX ORDER — AZITHROMYCIN 250 MG/1
1000 TABLET, FILM COATED ORAL
Status: COMPLETED | OUTPATIENT
Start: 2018-04-27 | End: 2018-04-27

## 2018-04-27 RX ADMIN — AZITHROMYCIN 1000 MG: 250 TABLET, FILM COATED ORAL at 11:41

## 2018-04-27 RX ADMIN — LIDOCAINE HYDROCHLORIDE 250 MG: 10 INJECTION, SOLUTION EPIDURAL; INFILTRATION; INTRACAUDAL; PERINEURAL at 11:41

## 2018-04-27 NOTE — ED PROVIDER NOTES
EMERGENCY DEPARTMENT HISTORY AND PHYSICAL EXAM      Date: 4/27/2018  Patient Name: Valarie Mims    History of Presenting Illness     Chief Complaint   Patient presents with    Vaginal Discharge     x 3 days       History Provided By: Patient    HPI: Valarie Mims, 26 y.o. female with PMHx significant for chlamydia, presents ambulatory to the ED with cc of change in vaginal discharge x 3 days. Pt reports yellow discharge with itching. Denies malodorous discharge, vaginal burning. Denies dysuria, hematuria, urinary urgency/frequency. Rates discomfort 9/10. Has not tried anything for sx. Denies new partners. Denies condom use. Reports concern for STDs. Chief Complaint: change in vaginal discharge  Duration: 3 Days  Timing:  Gradual  Location: vaginal area  Quality: N/A  Severity: 9 out of 10  Modifying Factors: none  Associated Symptoms: denies any other associated signs or symptoms      There are no other complaints, changes, or physical findings at this time. PCP: None    Current Outpatient Prescriptions   Medication Sig Dispense Refill    metroNIDAZOLE (FLAGYL) 500 mg tablet Take 4 Tabs by mouth now for 1 dose. Take all 4 pills one time. 4 Tab 0    ondansetron (ZOFRAN ODT) 4 mg disintegrating tablet Take 1 Tab by mouth every eight (8) hours as needed for Nausea. 10 Tab 0    hydrocortisone (CORTAID) 1 % topical cream Apply  to affected area two (2) times a day. use thin layer 30 g 0    cyclobenzaprine (FLEXERIL) 10 mg tablet Take 1 tablet by mouth three (3) times daily as needed for Muscle Spasm(s). 12 tablet 0    ibuprofen (MOTRIN) 600 mg tablet Take 1 tablet by mouth every six (6) hours as needed for Pain. 20 tablet 0       Past History     Past Medical History:  Past Medical History:   Diagnosis Date    Chlamydia        Past Surgical History:  No past surgical history on file. Family History:  No family history on file.     Social History:  Social History   Substance Use Topics    Smoking status: Current Every Day Smoker     Packs/day: 0.25    Smokeless tobacco: Never Used    Alcohol use No       Allergies:  No Known Allergies      Review of Systems   Review of Systems   Constitutional: Negative for chills and fever. Respiratory: Negative for shortness of breath. Cardiovascular: Negative for chest pain. Gastrointestinal: Negative for abdominal pain, constipation, diarrhea, nausea and vomiting. Genitourinary: Positive for vaginal discharge. Negative for flank pain, pelvic pain, vaginal bleeding and vaginal pain. Musculoskeletal: Negative for back pain and myalgias. Skin: Negative for color change, pallor, rash and wound. Neurological: Negative for dizziness, weakness and light-headedness. All other systems reviewed and are negative. Physical Exam   Physical Exam   Constitutional: She is oriented to person, place, and time. She appears well-developed and well-nourished. No distress. HENT:   Head: Normocephalic and atraumatic. Eyes: Conjunctivae are normal.   Cardiovascular: Normal rate, regular rhythm and normal heart sounds. Pulmonary/Chest: Effort normal and breath sounds normal. No respiratory distress. Abdominal: Soft. Bowel sounds are normal. She exhibits no distension. Genitourinary: There is no lesion on the right labia. There is no lesion on the left labia. Uterus is not tender. Cervix exhibits discharge (scant amount of mucoid discharge, no odor, cervical os closed). Cervix exhibits no motion tenderness and no friability. Right adnexum displays no tenderness. Left adnexum displays no tenderness. No erythema, tenderness or bleeding in the vagina. No foreign body in the vagina. No signs of injury around the vagina. No vaginal discharge found. Musculoskeletal: Normal range of motion. Neurological: She is alert and oriented to person, place, and time. No cranial nerve deficit. Skin: Skin is warm. No rash noted. Psychiatric: She has a normal mood and affect.  Her behavior is normal.   Nursing note and vitals reviewed. Diagnostic Study Results     Labs -     Recent Results (from the past 12 hour(s))   URINALYSIS W/ REFLEX CULTURE    Collection Time: 04/27/18 11:30 AM   Result Value Ref Range    Color YELLOW/STRAW      Appearance CLEAR CLEAR      Specific gravity 1.025 1.003 - 1.030      pH (UA) 6.0 5.0 - 8.0      Protein NEGATIVE  NEG mg/dL    Glucose NEGATIVE  NEG mg/dL    Ketone NEGATIVE  NEG mg/dL    Bilirubin NEGATIVE  NEG      Blood NEGATIVE  NEG      Urobilinogen 0.2 0.2 - 1.0 EU/dL    Nitrites NEGATIVE  NEG      Leukocyte Esterase NEGATIVE  NEG      WBC 0-4 0 - 4 /hpf    RBC 0-5 0 - 5 /hpf    Epithelial cells FEW FEW /lpf    Bacteria NEGATIVE  NEG /hpf    UA:UC IF INDICATED CULTURE NOT INDICATED BY UA RESULT CNI     KOH, OTHER SOURCES    Collection Time: 04/27/18 11:30 AM   Result Value Ref Range    Special Requests: NO SPECIAL REQUESTS      KOH NO YEAST SEEN     WET PREP    Collection Time: 04/27/18 11:30 AM   Result Value Ref Range    Clue cells CLUE CELLS ABSENT      Wet prep TRICHOMONAS PRESENT     HCG URINE, QL. - POC    Collection Time: 04/27/18 11:33 AM   Result Value Ref Range    Pregnancy test,urine (POC) NEGATIVE  NEG         Radiologic Studies -   No orders to display     CT Results  (Last 48 hours)    None        CXR Results  (Last 48 hours)    None            Medical Decision Making   I am the first provider for this patient. I reviewed the vital signs, available nursing notes, past medical history, past surgical history, family history and social history. Vital Signs-Reviewed the patient's vital signs. Patient Vitals for the past 12 hrs:   Temp Pulse Resp BP SpO2   04/27/18 1102 98.2 °F (36.8 °C) 82 16 134/75 98 %       Records Reviewed: Nursing Notes and Old Medical Records    Provider Notes (Medical Decision Making):   DDx: gonorrhea, chlamydia, trich, uti, bv, yeast    ED Course:   Initial assessment performed.  The patients presenting problems have been discussed, and they are in agreement with the care plan formulated and outlined with them. I have encouraged them to ask questions as they arise throughout their visit. Disposition:  Discussed lab results with pt along with dx and treatment plan. Discussed importance of  PCP follow up. All questions answered. Pt voiced they understood. Return if sx worsen. PLAN:  1. Discharge Medication List as of 4/27/2018 12:02 PM      START taking these medications    Details   metroNIDAZOLE (FLAGYL) 500 mg tablet Take 4 Tabs by mouth now for 1 dose. Take all 4 pills one time., Normal, Disp-4 Tab, R-0         CONTINUE these medications which have NOT CHANGED    Details   ondansetron (ZOFRAN ODT) 4 mg disintegrating tablet Take 1 Tab by mouth every eight (8) hours as needed for Nausea., Normal, Disp-10 Tab, R-0      hydrocortisone (CORTAID) 1 % topical cream Apply  to affected area two (2) times a day. use thin layer, Normal, Disp-30 g, R-0      cyclobenzaprine (FLEXERIL) 10 mg tablet Take 1 tablet by mouth three (3) times daily as needed for Muscle Spasm(s). , Print, Disp-12 tablet, R-0      ibuprofen (MOTRIN) 600 mg tablet Take 1 tablet by mouth every six (6) hours as needed for Pain., Print, Disp-20 tablet, R-0           2. Follow-up Information     Follow up With Details Comments Contact Info    [facility] Schedule an appointment as soon as possible for a visit As needed [address] [place] [id number] [facility]  [phone]        Return to ED if worse     Diagnosis     Clinical Impression:   1.  Trichimoniasis

## 2018-04-27 NOTE — ED NOTES
Discharge instructions were given to the patient by RIN Solano. The patient left the Emergency Department ambulatory, alert and oriented and in no acute distress with 1 prescription. The patient was encouraged to call or return to the ED for worsening issues or problems and was encouraged to schedule a follow up appointment for continuing care. The patient verbalized understanding of discharge instructions and prescriptions, all questions were answered. The patient has no further concerns at this time.

## 2018-04-27 NOTE — DISCHARGE INSTRUCTIONS
Trichomoniasis: Care Instructions  Your Care Instructions  Trichomoniasis is a sexually transmitted infection (STI) that is spread by having sex with an infected partner. Trichomoniasis is commonly called trich (say \"trick\"). In women, trich may cause vaginal itching and a smelly discharge. But in many cases, especially in men, there are no symptoms. Ashley Fitzgerald is treated so that you do not spread it to others. Both you and your sex partner or partners should be treated at the same time so you do not infect each other again. Trich may cause problems with pregnancy. Your doctor will talk with you about treatment for Trich if you are pregnant. Follow-up care is a key part of your treatment and safety. Be sure to make and go to all appointments, and call your doctor if you are having problems. It's also a good idea to know your test results and keep a list of the medicines you take. How can you care for yourself at home? · Take your antibiotics as directed. Do not stop taking them just because you feel better. You need to take the full course of antibiotics. · Do not have sex while you are being treated. If your doctor gave you a single dose of antibiotics, do not have sex for one week after being treated and until your partner also has been treated. · Tell your sex partner (or partners) that he or she will also need to be tested and treated. · Use a cold water compress or cool baths to relieve itching. To prevent trichomoniasis in the future  · Use latex condoms every time you have sex. Use them from the beginning to the end of sexual contact. · Talk to your partner before having sex. Find out if he or she has or is at risk for trich or any other STI. Keep in mind that a person may be able to spread an STI even if he or she does not have symptoms. · Do not have sex if you are being treated for trich or any other STI.   · Do not have sex with anyone who has symptoms of an STI, such as sores on the genitals or mouth.  · Having one sex partner (who does not have STIs and does not have sex with anyone else) is a good way to avoid STIs. When should you call for help? Call your doctor now or seek immediate medical care if:  ? · You have unusual vaginal bleeding. ? · You have a fever. ? · You have new discharge from the vagina or penis. ? · You have pelvic pain. ? Watch closely for changes in your health, and be sure to contact your doctor if:  ? · You do not get better as expected. ? · You have any new symptoms or your symptoms get worse. Where can you learn more? Go to http://byron-isai.info/. Enter W206 in the search box to learn more about \"Trichomoniasis: Care Instructions. \"  Current as of: March 20, 2017  Content Version: 11.4  © 5060-9938 [employer]. Care instructions adapted under license by [employer] (which disclaims liability or warranty for this information). If you have questions about a medical condition or this instruction, always ask your healthcare professional. [address] any warranty or liability for your use of this information.

## 2018-04-27 NOTE — ED NOTES
Pt presents ambulatory to ED complaining of itchy, vaginal discharge x3 days. Pt is alert and oriented x 4, RR even and unlabored, skin is warm and dry. Assesment completed and pt updated on plan of care. Emergency Department Nursing Plan of Care       The Nursing Plan of Care is developed from the Nursing assessment and Emergency Department Attending provider initial evaluation. The plan of care may be reviewed in the ED Provider note.     The Plan of Care was developed with the following considerations:   Patient / Family readiness to learn indicated by:verbalized understanding  Persons(s) to be included in education: patient  Barriers to Learning/Limitations:No    Signed     Reid Sheridan, RN    4/27/2018   11:57 AM

## 2018-04-30 LAB
C TRACH DNA SPEC QL NAA+PROBE: NEGATIVE
N GONORRHOEA DNA SPEC QL NAA+PROBE: NEGATIVE
SAMPLE TYPE: NORMAL
SERVICE CMNT-IMP: NORMAL
SPECIMEN SOURCE: NORMAL

## 2018-06-01 ENCOUNTER — HOSPITAL ENCOUNTER (EMERGENCY)
Age: 23
Discharge: HOME | End: 2018-06-01
Attending: EMERGENCY MEDICINE
Payer: COMMERCIAL

## 2018-06-01 VITALS
WEIGHT: 128 LBS | RESPIRATION RATE: 12 BRPM | HEART RATE: 77 BPM | SYSTOLIC BLOOD PRESSURE: 130 MMHG | DIASTOLIC BLOOD PRESSURE: 82 MMHG | TEMPERATURE: 98.5 F | HEIGHT: 60 IN | BODY MASS INDEX: 25.13 KG/M2 | OXYGEN SATURATION: 98 %

## 2018-06-01 DIAGNOSIS — B96.89 BV (BACTERIAL VAGINOSIS): ICD-10-CM

## 2018-06-01 DIAGNOSIS — N39.0 URINARY TRACT INFECTION WITHOUT HEMATURIA, SITE UNSPECIFIED: Primary | ICD-10-CM

## 2018-06-01 DIAGNOSIS — N76.0 BV (BACTERIAL VAGINOSIS): ICD-10-CM

## 2018-06-01 LAB
AMORPH CRY URNS QL MICRO: ABNORMAL
APPEARANCE UR: ABNORMAL
BACTERIA URNS QL MICRO: ABNORMAL /HPF
BILIRUB UR QL: NEGATIVE
CLUE CELLS VAG QL WET PREP: NORMAL
COLOR UR: ABNORMAL
EPITH CASTS URNS QL MICRO: ABNORMAL /LPF
GLUCOSE UR STRIP.AUTO-MCNC: NEGATIVE MG/DL
HCG UR QL: NEGATIVE
HGB UR QL STRIP: ABNORMAL
KETONES UR QL STRIP.AUTO: NEGATIVE MG/DL
KOH PREP SPEC: NORMAL
LEUKOCYTE ESTERASE UR QL STRIP.AUTO: NEGATIVE
NITRITE UR QL STRIP.AUTO: NEGATIVE
PH UR STRIP: 7 [PH] (ref 5–8)
PROT UR STRIP-MCNC: ABNORMAL MG/DL
RBC #/AREA URNS HPF: ABNORMAL /HPF (ref 0–5)
SERVICE CMNT-IMP: NORMAL
SP GR UR REFRACTOMETRY: 1.01 (ref 1–1.03)
T VAGINALIS VAG QL WET PREP: NORMAL
UA: UC IF INDICATED,UAUC: ABNORMAL
UROBILINOGEN UR QL STRIP.AUTO: 1 EU/DL (ref 0.2–1)
WBC URNS QL MICRO: ABNORMAL /HPF (ref 0–4)

## 2018-06-01 PROCEDURE — 96372 THER/PROPH/DIAG INJ SC/IM: CPT

## 2018-06-01 PROCEDURE — 74011250637 HC RX REV CODE- 250/637: Performed by: NURSE PRACTITIONER

## 2018-06-01 PROCEDURE — 74011000250 HC RX REV CODE- 250: Performed by: NURSE PRACTITIONER

## 2018-06-01 PROCEDURE — 74011250636 HC RX REV CODE- 250/636: Performed by: NURSE PRACTITIONER

## 2018-06-01 PROCEDURE — 87491 CHLMYD TRACH DNA AMP PROBE: CPT | Performed by: NURSE PRACTITIONER

## 2018-06-01 PROCEDURE — 87086 URINE CULTURE/COLONY COUNT: CPT | Performed by: EMERGENCY MEDICINE

## 2018-06-01 PROCEDURE — 87210 SMEAR WET MOUNT SALINE/INK: CPT | Performed by: NURSE PRACTITIONER

## 2018-06-01 PROCEDURE — 99284 EMERGENCY DEPT VISIT MOD MDM: CPT

## 2018-06-01 PROCEDURE — 81025 URINE PREGNANCY TEST: CPT

## 2018-06-01 PROCEDURE — 81001 URINALYSIS AUTO W/SCOPE: CPT | Performed by: EMERGENCY MEDICINE

## 2018-06-01 RX ORDER — AZITHROMYCIN 250 MG/1
1000 TABLET, FILM COATED ORAL
Status: COMPLETED | OUTPATIENT
Start: 2018-06-01 | End: 2018-06-01

## 2018-06-01 RX ORDER — NITROFURANTOIN 25; 75 MG/1; MG/1
100 CAPSULE ORAL 2 TIMES DAILY
Qty: 6 CAP | Refills: 0 | Status: SHIPPED | OUTPATIENT
Start: 2018-06-01 | End: 2018-06-04

## 2018-06-01 RX ORDER — METRONIDAZOLE 500 MG/1
500 TABLET ORAL 2 TIMES DAILY
Qty: 14 TAB | Refills: 0 | Status: SHIPPED | OUTPATIENT
Start: 2018-06-01 | End: 2018-06-08

## 2018-06-01 RX ADMIN — AZITHROMYCIN 1000 MG: 250 TABLET, FILM COATED ORAL at 21:20

## 2018-06-01 RX ADMIN — LIDOCAINE HYDROCHLORIDE 250 MG: 10 INJECTION, SOLUTION EPIDURAL; INFILTRATION; INTRACAUDAL; PERINEURAL at 21:20

## 2018-06-02 NOTE — DISCHARGE INSTRUCTIONS
Bacterial Vaginosis: Care Instructions  Your Care Instructions    Bacterial vaginosis is a type of vaginal infection. It is caused by excess growth of certain bacteria that are normally found in the vagina. Symptoms can include itching, swelling, pain when you urinate or have sex, and a gray or yellow discharge with a \"fishy\" odor. It is not considered an infection that is spread through sexual contact. Although symptoms can be annoying and uncomfortable, bacterial vaginosis does not usually cause other health problems. However, if you have it while you are pregnant, it can cause complications. While the infection may go away on its own, most doctors use antibiotics to treat it. You may have been prescribed pills or vaginal cream. With treatment, bacterial vaginosis usually clears up in 5 to 7 days. Follow-up care is a key part of your treatment and safety. Be sure to make and go to all appointments, and call your doctor if you are having problems. It's also a good idea to know your test results and keep a list of the medicines you take. How can you care for yourself at home? · Take your antibiotics as directed. Do not stop taking them just because you feel better. You need to take the full course of antibiotics. · Do not eat or drink anything that contains alcohol if you are taking metronidazole (Flagyl). · Keep using your medicine if you start your period. Use pads instead of tampons while using a vaginal cream or suppository. Tampons can absorb the medicine. · Wear loose cotton clothing. Do not wear nylon and other materials that hold body heat and moisture close to the skin. · Do not scratch. Relieve itching with a cold pack or a cool bath. · Do not wash your vaginal area more than once a day. Use plain water or a mild, unscented soap. Do not douche. When should you call for help?   Watch closely for changes in your health, and be sure to contact your doctor if:  ? · You have unexpected vaginal bleeding. ? · You have a fever. ? · You have new or increased pain in your vagina or pelvis. ? · You are not getting better after 1 week. ? · Your symptoms return after you finish the course of your medicine. Where can you learn more? Go to http://byron-isai.info/. Darren Magallanes in the search box to learn more about \"Bacterial Vaginosis: Care Instructions. \"  Current as of: October 13, 2016  Content Version: 11.4  © 4330-7138 [employer]. Care instructions adapted under license by [employer] (which disclaims liability or warranty for this information). If you have questions about a medical condition or this instruction, always ask your healthcare professional. [place] [address] any warranty or liability for your use of this information. Urinary Tract Infection in Women: Care Instructions  Your Care Instructions    A urinary tract infection, or UTI, is a general term for an infection anywhere between the kidneys and the urethra (where urine comes out). Most UTIs are bladder infections. They often cause pain or burning when you urinate. UTIs are caused by bacteria and can be cured with antibiotics. Be sure to complete your treatment so that the infection goes away. Follow-up care is a key part of your treatment and safety. Be sure to make and go to all appointments, and call your doctor if you are having problems. It's also a good idea to know your test results and keep a list of the medicines you take. How can you care for yourself at home? · Take your antibiotics as directed. Do not stop taking them just because you feel better. You need to take the full course of antibiotics. · Drink extra water and other fluids for the next day or two. This may help wash out the bacteria that are causing the infection.  (If you have kidney, heart, or liver disease and have to limit fluids, talk with your doctor before you increase your fluid intake.)  · Avoid drinks that are carbonated or have caffeine. They can irritate the bladder. · Urinate often. Try to empty your bladder each time. · To relieve pain, take a hot bath or lay a heating pad set on low over your lower belly or genital area. Never go to sleep with a heating pad in place. To prevent UTIs  · Drink plenty of water each day. This helps you urinate often, which clears bacteria from your system. (If you have kidney, heart, or liver disease and have to limit fluids, talk with your doctor before you increase your fluid intake.)  · Urinate when you need to. · Urinate right after you have sex. · Change sanitary pads often. · Avoid douches, bubble baths, feminine hygiene sprays, and other feminine hygiene products that have deodorants. · After going to the bathroom, wipe from front to back. When should you call for help? Call your doctor now or seek immediate medical care if:  ? · Symptoms such as fever, chills, nausea, or vomiting get worse or appear for the first time. ? · You have new pain in your back just below your rib cage. This is called flank pain. ? · There is new blood or pus in your urine. ? · You have any problems with your antibiotic medicine. ? Watch closely for changes in your health, and be sure to contact your doctor if:  ? · You are not getting better after taking an antibiotic for 2 days. ? · Your symptoms go away but then come back. Where can you learn more? Go to http://byron-isai.info/. Enter H243 in the search box to learn more about \"Urinary Tract Infection in Women: Care Instructions. \"  Current as of: May 12, 2017  Content Version: 11.4  © 0097-0893 [employer]. Care instructions adapted under license by [employer] (which disclaims liability or warranty for this information).  If you have questions about a medical condition or this instruction, always ask your healthcare professional. [employer] disclaims any warranty or liability for your use of this information.

## 2018-06-02 NOTE — ED NOTES
Discharge instructions were given to the patient by provider. The patient left the Emergency Department ambulatory, alert and oriented and in no acute distress with 2 prescriptions. The patient was encouraged to call or return to the ED for worsening issues or problems and was encouraged to schedule a follow up appointment for continuing care. The patient verbalized understanding of discharge instructions and prescriptions, all questions were answered. The patient has no further concerns at this time.

## 2018-06-02 NOTE — ED PROVIDER NOTES
EMERGENCY DEPARTMENT HISTORY AND PHYSICAL EXAM    Date: 6/1/2018  Patient Name: Margaret John    History of Presenting Illness     Chief Complaint   Patient presents with    Vaginal Bleeding     and discharge. x2 days. taking birth control. History Provided By: Patient    Chief Complaint: vaginal bleeding  Duration: 2 Days  Timing:  Acute  Location: vaginal    Severity: Mild intermittent no clots  Modifying Factors: none  Associated Symptoms: denies any other associated signs or symptoms      HPI: Margaret Jonh is a 26 y.o. female with a PMH of No significant past medical history who presents with vaginal bleeding states she had an IUD placed one year ago and has had no bleeding since then 2 days ago started bleeding. denies pain . PCP: None    Current Outpatient Prescriptions   Medication Sig Dispense Refill    nitrofurantoin, macrocrystal-monohydrate, (MACROBID) 100 mg capsule Take 1 Cap by mouth two (2) times a day for 3 days. 6 Cap 0    ondansetron (ZOFRAN ODT) 4 mg disintegrating tablet Take 1 Tab by mouth every eight (8) hours as needed for Nausea. 10 Tab 0    hydrocortisone (CORTAID) 1 % topical cream Apply  to affected area two (2) times a day. use thin layer 30 g 0    cyclobenzaprine (FLEXERIL) 10 mg tablet Take 1 tablet by mouth three (3) times daily as needed for Muscle Spasm(s). 12 tablet 0    ibuprofen (MOTRIN) 600 mg tablet Take 1 tablet by mouth every six (6) hours as needed for Pain. 20 tablet 0       Past History     Past Medical History:  Past Medical History:   Diagnosis Date    Chlamydia        Past Surgical History:  History reviewed. No pertinent surgical history. Family History:  History reviewed. No pertinent family history.     Social History:  Social History   Substance Use Topics    Smoking status: Current Every Day Smoker     Packs/day: 0.25    Smokeless tobacco: Never Used    Alcohol use No       Allergies:  No Known Allergies      Review of Systems   Review of Systems   Constitutional: Negative for fatigue and fever. Respiratory: Negative for shortness of breath and wheezing. Cardiovascular: Negative for chest pain and palpitations. Gastrointestinal: Negative for abdominal pain. Genitourinary: Positive for vaginal bleeding. Negative for dysuria and vaginal discharge. Musculoskeletal: Negative for arthralgias, myalgias, neck pain and neck stiffness. Skin: Negative for pallor and rash. Neurological: Negative for dizziness, tremors, weakness and headaches. Hematological: Negative for adenopathy. All other systems reviewed and are negative. Physical Exam     Vitals:    06/01/18 2008   BP: 130/82   Pulse: 77   Resp: 12   Temp: 98.5 °F (36.9 °C)   SpO2: 98%   Weight: 58.1 kg (128 lb)   Height: 5' (1.524 m)     Physical Exam   Constitutional: She is oriented to person, place, and time. She appears well-developed and well-nourished. No distress. HENT:   Head: Normocephalic and atraumatic. Right Ear: External ear normal.   Left Ear: External ear normal.   Nose: Nose normal.   Eyes: Conjunctivae are normal.   Neck: Normal range of motion. Neck supple. Cardiovascular: Normal rate and regular rhythm. Pulmonary/Chest: Effort normal and breath sounds normal. No respiratory distress. She has no wheezes. Abdominal: Soft. Bowel sounds are normal. There is no tenderness. Musculoskeletal: Normal range of motion. Lymphadenopathy:     She has no cervical adenopathy. Neurological: She is alert and oriented to person, place, and time. No cranial nerve deficit. Coordination normal.   Skin: Skin is warm and dry. No rash noted. Psychiatric: She has a normal mood and affect. Her behavior is normal. Judgment and thought content normal.   Nursing note and vitals reviewed. Procedure Note - Pelvic Exam:   Performed by Tima Rdz NP. The external vulva and vagina are normal in appearance, without lesions.  The speculum exam demonstrates normal vaginal mucosa with small amount of  discharge. The cervix is normal in appearance without discharge. The bimanual exam demonstrates a(n) closed cervix without cervical motion tenderness. The uterus is firm, not enlarged, and non-tender, without palpable masses. The adenexa are non-tender. Diagnostic Study Results     Labs -     Recent Results (from the past 12 hour(s))   URINALYSIS W/ REFLEX CULTURE    Collection Time: 06/01/18  8:22 PM   Result Value Ref Range    Color YELLOW/STRAW      Appearance CLOUDY (A) CLEAR      Specific gravity 1.010 1.003 - 1.030      pH (UA) 7.0 5.0 - 8.0      Protein TRACE (A) NEG mg/dL    Glucose NEGATIVE  NEG mg/dL    Ketone NEGATIVE  NEG mg/dL    Bilirubin NEGATIVE  NEG      Blood SMALL (A) NEG      Urobilinogen 1.0 0.2 - 1.0 EU/dL    Nitrites NEGATIVE  NEG      Leukocyte Esterase NEGATIVE  NEG      WBC 0-4 0 - 4 /hpf    RBC 0-5 0 - 5 /hpf    Epithelial cells FEW FEW /lpf    Bacteria 1+ (A) NEG /hpf    UA:UC IF INDICATED URINE CULTURE ORDERED (A) CNI      Amorphous Crystals 1+ (A) NEG   HCG URINE, QL. - POC    Collection Time: 06/01/18  8:23 PM   Result Value Ref Range    Pregnancy test,urine (POC) NEGATIVE  NEG         Radiologic Studies -   No orders to display     CT Results  (Last 48 hours)    None        CXR Results  (Last 48 hours)    None            Medical Decision Making   I am the first provider for this patient. I reviewed the vital signs, available nursing notes, past medical history, past surgical history, family history and social history. Vital Signs-Reviewed the patient's vital signs. Records Reviewed: Nursing Notes and Old Medical Records    ED Course:   stable  Disposition:  home    DISCHARGE NOTE:         Care plan outlined and precautions discussed. Patient has no new complaints, changes, or physical findings. Results of tests were reviewed with the patient. All medications were reviewed with the patient; will d/c home with .  All of pt's questions and concerns were addressed. Patient was instructed and agrees to follow up with PCP, as well as to return to the ED upon further deterioration. Patient is ready to go home. Follow-up Information     None          Current Discharge Medication List      START taking these medications    Details   nitrofurantoin, macrocrystal-monohydrate, (MACROBID) 100 mg capsule Take 1 Cap by mouth two (2) times a day for 3 days. Qty: 6 Cap, Refills: 0             Provider Notes (Medical Decision Making):   DDX DUB STD BV UTI  Procedures:  Procedures        Diagnosis     Clinical Impression:   1.  Urinary tract infection without hematuria, site unspecified

## 2018-06-02 NOTE — ED NOTES
Pt presents to ED ambulatory complaining of vaginal discharge x 2 days. Pt reports she has an IUD in place so is concerned about vaginal bleeding. Pt has had IUD in for 1 year and denies bleeding before this occurrence. Pt is alert and oriented x 4, RR even and unlabored, skin is warm and dry. Assessment completed and pt updated on plan of care. Emergency Department Nursing Plan of Care       The Nursing Plan of Care is developed from the Nursing assessment and Emergency Department Attending provider initial evaluation. The plan of care may be reviewed in the ED Provider note.     The Plan of Care was developed with the following considerations:   Patient / Family readiness to learn indicated by:verbalized understanding  Persons(s) to be included in education: patient  Barriers to Learning/Limitations:No    Signed     Jan Albarado    6/1/2018   8:38 PM

## 2018-06-03 LAB
BACTERIA SPEC CULT: NORMAL
CC UR VC: NORMAL
SERVICE CMNT-IMP: NORMAL

## 2018-11-16 ENCOUNTER — HOSPITAL ENCOUNTER (EMERGENCY)
Age: 23
Discharge: HOME | End: 2018-11-16
Attending: EMERGENCY MEDICINE
Payer: COMMERCIAL

## 2018-11-16 VITALS
HEIGHT: 60 IN | BODY MASS INDEX: 26.9 KG/M2 | SYSTOLIC BLOOD PRESSURE: 136 MMHG | DIASTOLIC BLOOD PRESSURE: 87 MMHG | WEIGHT: 137 LBS | RESPIRATION RATE: 18 BRPM | OXYGEN SATURATION: 99 % | TEMPERATURE: 98.4 F | HEART RATE: 74 BPM

## 2018-11-16 DIAGNOSIS — N76.0 BV (BACTERIAL VAGINOSIS): Primary | ICD-10-CM

## 2018-11-16 DIAGNOSIS — B96.89 BV (BACTERIAL VAGINOSIS): Primary | ICD-10-CM

## 2018-11-16 DIAGNOSIS — N30.00 ACUTE CYSTITIS WITHOUT HEMATURIA: ICD-10-CM

## 2018-11-16 LAB
APPEARANCE UR: CLEAR
BACTERIA URNS QL MICRO: ABNORMAL /HPF
BILIRUB UR QL: NEGATIVE
CLUE CELLS VAG QL WET PREP: NORMAL
COLOR UR: ABNORMAL
EPITH CASTS URNS QL MICRO: ABNORMAL /LPF
GLUCOSE UR STRIP.AUTO-MCNC: NEGATIVE MG/DL
HCG UR QL: NEGATIVE
HGB UR QL STRIP: NEGATIVE
KETONES UR QL STRIP.AUTO: NEGATIVE MG/DL
KOH PREP SPEC: NORMAL
LEUKOCYTE ESTERASE UR QL STRIP.AUTO: NEGATIVE
NITRITE UR QL STRIP.AUTO: NEGATIVE
PH UR STRIP: 7 [PH] (ref 5–8)
PROT UR STRIP-MCNC: NEGATIVE MG/DL
RBC #/AREA URNS HPF: ABNORMAL /HPF (ref 0–5)
SERVICE CMNT-IMP: NORMAL
SP GR UR REFRACTOMETRY: 1.02 (ref 1–1.03)
T VAGINALIS VAG QL WET PREP: NORMAL
UA: UC IF INDICATED,UAUC: ABNORMAL
UROBILINOGEN UR QL STRIP.AUTO: 0.2 EU/DL (ref 0.2–1)
WBC URNS QL MICRO: ABNORMAL /HPF (ref 0–4)

## 2018-11-16 PROCEDURE — 87210 SMEAR WET MOUNT SALINE/INK: CPT

## 2018-11-16 PROCEDURE — 99284 EMERGENCY DEPT VISIT MOD MDM: CPT

## 2018-11-16 PROCEDURE — 74011250637 HC RX REV CODE- 250/637: Performed by: PHYSICIAN ASSISTANT

## 2018-11-16 PROCEDURE — 87491 CHLMYD TRACH DNA AMP PROBE: CPT

## 2018-11-16 PROCEDURE — 74011250636 HC RX REV CODE- 250/636: Performed by: PHYSICIAN ASSISTANT

## 2018-11-16 PROCEDURE — 81001 URINALYSIS AUTO W/SCOPE: CPT

## 2018-11-16 PROCEDURE — 96372 THER/PROPH/DIAG INJ SC/IM: CPT

## 2018-11-16 PROCEDURE — 81025 URINE PREGNANCY TEST: CPT

## 2018-11-16 PROCEDURE — 87086 URINE CULTURE/COLONY COUNT: CPT

## 2018-11-16 RX ORDER — CEPHALEXIN 500 MG/1
500 CAPSULE ORAL 2 TIMES DAILY
Qty: 14 CAP | Refills: 0 | Status: SHIPPED | OUTPATIENT
Start: 2018-11-16 | End: 2018-11-23

## 2018-11-16 RX ORDER — METRONIDAZOLE 500 MG/1
500 TABLET ORAL 2 TIMES DAILY
Qty: 14 TAB | Refills: 0 | Status: SHIPPED | OUTPATIENT
Start: 2018-11-16 | End: 2018-11-23

## 2018-11-16 RX ORDER — AZITHROMYCIN 500 MG/1
1000 TABLET, FILM COATED ORAL
Status: COMPLETED | OUTPATIENT
Start: 2018-11-16 | End: 2018-11-16

## 2018-11-16 RX ADMIN — AZITHROMYCIN 1000 MG: 500 TABLET, FILM COATED ORAL at 22:27

## 2018-11-16 RX ADMIN — LIDOCAINE HYDROCHLORIDE 250 MG: 10 INJECTION, SOLUTION EPIDURAL; INFILTRATION; INTRACAUDAL; PERINEURAL at 22:29

## 2018-11-17 NOTE — ED NOTES
Pt presents ambulatory to ED complaining of vaginal discharge x1 week. Pt reports vaginal irritation and burning. Pt denies N/V. Pt denies urinary symptoms. Pt denies dysuria, denies hematuria. Pt is alert and oriented x 4, RR even and unlabored, skin is warm and dry. Assesment completed and pt updated on plan of care. Emergency Department Nursing Plan of Care The Nursing Plan of Care is developed from the Nursing assessment and Emergency Department Attending provider initial evaluation. The plan of care may be reviewed in the ED Provider note. The Plan of Care was developed with the following considerations:  
Patient / Family readiness to learn indicated by:verbalized understanding Persons(s) to be included in education: patient Barriers to Learning/Limitations: No 
 
Signed Kinsey Valle [facility]   
11/16/2018   10:06 PM

## 2018-11-17 NOTE — ED NOTES
Discharge instructions were given to the patient by RIN Alas. The patient left the Emergency Department ambulatory, alert and oriented and in no acute distress with 2 prescriptions. The patient was encouraged to call or return to the ED for worsening issues or problems and was encouraged to schedule a follow up appointment for continuing care. The patient verbalized understanding of discharge instructions and prescriptions, all questions were answered. The patient has no further concerns at this time.

## 2018-11-17 NOTE — ED PROVIDER NOTES
EMERGENCY DEPARTMENT HISTORY AND PHYSICAL EXAM 
 
 
Date: 11/16/2018 Patient Name: Ralph Dutton History of Presenting Illness Chief Complaint Patient presents with  Vaginal Discharge  
  x 1 week with a burning pain History Provided By: Patient HPI: Ralph Dutton, 26 y.o. female with no significant PMHx, presents ambulatory to the ED with cc of increase in white vaginal discharge x 1 week with assoc intermittent burning and irritation. Denies fever/chills, dysuria, hematuria, abd pain, N/V. Admits to unprotected intercourse and would like to be tested for STDs. Has not taken anything for sx. LMP: 2 weeks ago. There are no other complaints, changes, or physical findings at this time. PCP: None Current Outpatient Medications Medication Sig Dispense Refill  cephALEXin (KEFLEX) 500 mg capsule Take 1 Cap by mouth two (2) times a day for 7 days. 14 Cap 0  
 metroNIDAZOLE (FLAGYL) 500 mg tablet Take 1 Tab by mouth two (2) times a day for 7 days. 14 Tab 0 Past History Past Medical History: 
Past Medical History:  
Diagnosis Date  Chlamydia Past Surgical History: 
History reviewed. No pertinent surgical history. Family History: 
History reviewed. No pertinent family history. Social History: 
Social History Tobacco Use  Smoking status: Current Every Day Smoker Packs/day: 0.25  Smokeless tobacco: Never Used Substance Use Topics  Alcohol use: No  
 Drug use: No  
 
 
Allergies: 
No Known Allergies Review of Systems Review of Systems Constitutional: Negative for chills and fever. Respiratory: Negative for shortness of breath. Cardiovascular: Negative for chest pain. Gastrointestinal: Negative for abdominal pain, nausea and vomiting. Genitourinary: Positive for vaginal discharge. Negative for dysuria, flank pain, frequency, genital sores, hematuria and vaginal bleeding. Musculoskeletal: Negative for back pain and myalgias. Skin: Negative for color change, pallor, rash and wound. Neurological: Negative for dizziness, weakness and light-headedness. All other systems reviewed and are negative. Physical Exam  
Physical Exam  
Constitutional: She is oriented to person, place, and time. She appears well-developed and well-nourished. No distress. HENT:  
Head: Normocephalic and atraumatic. Eyes: Conjunctivae are normal.  
Cardiovascular: Normal rate, regular rhythm and normal heart sounds. Pulmonary/Chest: Effort normal and breath sounds normal. No respiratory distress. Abdominal: Soft. Bowel sounds are normal. She exhibits no distension. Musculoskeletal: Normal range of motion. Neurological: She is alert and oriented to person, place, and time. Skin: Skin is warm. No rash noted. Psychiatric: She has a normal mood and affect. Her behavior is normal.  
Nursing note and vitals reviewed. Diagnostic Study Results Labs - Recent Results (from the past 12 hour(s)) URINALYSIS W/ REFLEX CULTURE Collection Time: 11/16/18 10:16 PM  
Result Value Ref Range Color YELLOW/STRAW Appearance CLEAR CLEAR Specific gravity 1.025 1.003 - 1.030    
 pH (UA) 7.0 5.0 - 8.0 Protein NEGATIVE  NEG mg/dL Glucose NEGATIVE  NEG mg/dL Ketone NEGATIVE  NEG mg/dL Bilirubin NEGATIVE  NEG Blood NEGATIVE  NEG Urobilinogen 0.2 0.2 - 1.0 EU/dL Nitrites NEGATIVE  NEG Leukocyte Esterase NEGATIVE  NEG    
 WBC 0-4 0 - 4 /hpf  
 RBC 0-5 0 - 5 /hpf Epithelial cells FEW FEW /lpf Bacteria 1+ (A) NEG /hpf  
 UA:UC IF INDICATED URINE CULTURE ORDERED (A) CNI    
ERNESTO, OTHER SOURCES Collection Time: 11/16/18 10:16 PM  
Result Value Ref Range Special Requests: NO SPECIAL REQUESTS    
 KOH NO YEAST SEEN    
WET PREP Collection Time: 11/16/18 10:16 PM  
Result Value Ref Range  Clue cells CLUE CELLS PRESENT    
 Wet prep NO TRICHOMONAS SEEN    
HCG URINE, QL. - POC Collection Time: 11/16/18 10:30 PM  
Result Value Ref Range Pregnancy test,urine (POC) NEGATIVE  NEG Radiologic Studies - No orders to display CT Results  (Last 48 hours) None CXR Results  (Last 48 hours) None Medical Decision Making I am the first provider for this patient. I reviewed the vital signs, available nursing notes, past medical history, past surgical history, family history and social history. Vital Signs-Reviewed the patient's vital signs. Patient Vitals for the past 12 hrs: 
 Temp Pulse Resp BP SpO2  
11/16/18 2138 98.4 °F (36.9 °C) 74 18 136/87 99 % Records Reviewed: Nursing Notes and Old Medical Records Provider Notes (Medical Decision Making): DDx: Gonorrhea, Chlamydia, Trich, UTI, BV, Yeast, PID Pt is good candidate for vaginal self-swab instead of pelvic exam due to stable vital signs and lack of abd pain. ED Course:  
Initial assessment performed. The patients presenting problems have been discussed, and they are in agreement with the care plan formulated and outlined with them. I have encouraged them to ask questions as they arise throughout their visit. Disposition: 
11:20 PM 
Discussed lab results with pt along with dx and treatment plan. Discussed importance of PCP follow up. All questions answered. Pt voiced they understood. Return if sx worsen. PLAN: 
1. Current Discharge Medication List  
  
START taking these medications Details  
cephALEXin (KEFLEX) 500 mg capsule Take 1 Cap by mouth two (2) times a day for 7 days. Qty: 14 Cap, Refills: 0  
  
metroNIDAZOLE (FLAGYL) 500 mg tablet Take 1 Tab by mouth two (2) times a day for 7 days. Qty: 14 Tab, Refills: 0  
  
  
 
2. Follow-up Information Follow up With Specialties Details Why Contact Info  [facility] [place]  Schedule an appointment as soon as possible for a visit As needed for gyn follow up [address] [place] [address] 
[phone] Nabila Williamson MD Obstetrics & Gynecology Schedule an appointment as soon as possible for a visit As needed for gyn follow up [address] [place] [address] 
[phone] Return to ED if worse Diagnosis Clinical Impression: 1. BV (bacterial vaginosis) 2. Acute cystitis without hematuria

## 2018-11-17 NOTE — DISCHARGE INSTRUCTIONS
Urinary Tract Infection in Pregnancy: Care Instructions  Your Care Instructions    A urinary tract infection, or UTI, is an infection of the bladder and other urinary structures. Most UTIs occur in the bladder. They often cause pain or burning when you urinate. UTI is the most common bacterial infection in pregnancy. If untreated, a UTI could lead to problems such as a kidney infection or  labor. Most UTIs can be cured with antibiotics. Your doctor will prescribe an antibiotic that is safe during pregnancy. Be sure to finish your medicine so that the infection does not spread to your kidneys. Follow-up care is a key part of your treatment and safety. Be sure to make and go to all appointments, and call your doctor if you are having problems. It's also a good idea to know your test results and keep a list of the medicines you take. How can you care for yourself at home? · Take your antibiotics as directed. Do not stop taking them just because you feel better. You need to take the full course of antibiotics. · Drink extra water and other fluids for the next day or two. This will help wash out the bacteria causing the infection. If you have kidney, heart, or liver disease and have to limit fluids, talk with your doctor before you increase the amount of fluids you drink. · Do not drink alcohol. · Urinate often. Try to empty your bladder each time. Preventing UTIs  · Drink plenty of fluids, enough so that your urine is light yellow or clear like water. This helps you urinate often, which clears bacteria from your system. If you have kidney, heart, or liver disease and have to limit fluids, talk with your doctor before you increase the amount of fluids you drink. · Urinate when you first have the urge. · Urinate right after you have sex. This is the best way for women to avoid UTIs. · When going to the bathroom, wipe from front to back to keep bacteria from entering the vagina or urethra.   When should you call for help? Call your doctor now or seek immediate medical care if:    · You have symptoms of a worse urinary tract infection. These may include:  ? Pain or burning when you urinate. ? A frequent need to urinate without being able to pass much urine. ? Pain in the flank, which is just below the rib cage and above the waist on either side of the back. ? Blood in your urine. ? A fever.    Watch closely for changes in your health, and be sure to contact your doctor if:    · You do not get better as expected. Where can you learn more? Go to http://byron-isai.info/. Enter M982 in the search box to learn more about \"Urinary Tract Infection in Pregnancy: Care Instructions. \"  Current as of: November 21, 2017  Content Version: 11.8  © 9149-2209 [employer]. Care instructions adapted under license by [employer] (which disclaims liability or warranty for this information). If you have questions about a medical condition or this instruction, always ask your healthcare professional. [place] [address] any warranty or liability for your use of this information. Bacterial Vaginosis: Care Instructions  Your Care Instructions    Bacterial vaginosis is a type of vaginal infection. It is caused by excess growth of certain bacteria that are normally found in the vagina. Symptoms can include itching, swelling, pain when you urinate or have sex, and a gray or yellow discharge with a \"fishy\" odor. It is not considered an infection that is spread through sexual contact. Although symptoms can be annoying and uncomfortable, bacterial vaginosis does not usually cause other health problems. However, if you have it while you are pregnant, it can cause complications. While the infection may go away on its own, most doctors use antibiotics to treat it.  You may have been prescribed pills or vaginal cream. With treatment, bacterial vaginosis usually clears up in 5 to 7 days. Follow-up care is a key part of your treatment and safety. Be sure to make and go to all appointments, and call your doctor if you are having problems. It's also a good idea to know your test results and keep a list of the medicines you take. How can you care for yourself at home? · Take your antibiotics as directed. Do not stop taking them just because you feel better. You need to take the full course of antibiotics. · Do not eat or drink anything that contains alcohol if you are taking metronidazole (Flagyl). · Keep using your medicine if you start your period. Use pads instead of tampons while using a vaginal cream or suppository. Tampons can absorb the medicine. · Wear loose cotton clothing. Do not wear nylon and other materials that hold body heat and moisture close to the skin. · Do not scratch. Relieve itching with a cold pack or a cool bath. · Do not wash your vaginal area more than once a day. Use plain water or a mild, unscented soap. Do not douche. When should you call for help? Watch closely for changes in your health, and be sure to contact your doctor if:    · You have unexpected vaginal bleeding.     · You have a fever.     · You have new or increased pain in your vagina or pelvis.     · You are not getting better after 1 week.     · Your symptoms return after you finish the course of your medicine. Where can you learn more? Go to http://byron-isai.info/. Kendy Murrell in the search box to learn more about \"Bacterial Vaginosis: Care Instructions. \"  Current as of: May 15, 2018  Content Version: 11.8  © 5951-3594 [employer]. Care instructions adapted under license by [employer] (which disclaims liability or warranty for this information).  If you have questions about a medical condition or this instruction, always ask your healthcare professional. [address] any warranty or liability for your use of this information.

## 2018-11-18 LAB
BACTERIA SPEC CULT: NORMAL
CC UR VC: NORMAL
SERVICE CMNT-IMP: NORMAL

## 2019-01-08 ENCOUNTER — APPOINTMENT (OUTPATIENT)
Dept: ULTRASOUND IMAGING | Age: 24
End: 2019-01-08
Attending: PHYSICIAN ASSISTANT
Payer: COMMERCIAL

## 2019-01-08 ENCOUNTER — HOSPITAL ENCOUNTER (EMERGENCY)
Age: 24
Discharge: HOME | End: 2019-01-08
Attending: EMERGENCY MEDICINE
Payer: COMMERCIAL

## 2019-01-08 VITALS
RESPIRATION RATE: 16 BRPM | HEIGHT: 60 IN | DIASTOLIC BLOOD PRESSURE: 82 MMHG | TEMPERATURE: 98.8 F | WEIGHT: 137 LBS | BODY MASS INDEX: 26.9 KG/M2 | SYSTOLIC BLOOD PRESSURE: 133 MMHG | OXYGEN SATURATION: 100 % | HEART RATE: 88 BPM

## 2019-01-08 DIAGNOSIS — Z32.01 PREGNANCY TEST PERFORMED, PREGNANCY CONFIRMED: Primary | ICD-10-CM

## 2019-01-08 DIAGNOSIS — O23.41 URINARY TRACT INFECTION IN MOTHER DURING FIRST TRIMESTER OF PREGNANCY: ICD-10-CM

## 2019-01-08 LAB
APPEARANCE UR: ABNORMAL
BACTERIA URNS QL MICRO: ABNORMAL /HPF
BILIRUB UR QL: NEGATIVE
COLOR UR: ABNORMAL
EPITH CASTS URNS QL MICRO: ABNORMAL /LPF
GLUCOSE UR STRIP.AUTO-MCNC: NEGATIVE MG/DL
HCG SERPL-ACNC: ABNORMAL MIU/ML (ref 0–6)
HCG UR QL: POSITIVE
HGB UR QL STRIP: NEGATIVE
KETONES UR QL STRIP.AUTO: NEGATIVE MG/DL
LEUKOCYTE ESTERASE UR QL STRIP.AUTO: ABNORMAL
MUCOUS THREADS URNS QL MICRO: ABNORMAL /LPF
NITRITE UR QL STRIP.AUTO: NEGATIVE
PH UR STRIP: 6 [PH] (ref 5–8)
PROT UR STRIP-MCNC: NEGATIVE MG/DL
RBC #/AREA URNS HPF: ABNORMAL /HPF (ref 0–5)
SP GR UR REFRACTOMETRY: 1.01 (ref 1–1.03)
UA: UC IF INDICATED,UAUC: ABNORMAL
UROBILINOGEN UR QL STRIP.AUTO: 0.2 EU/DL (ref 0.2–1)
WBC URNS QL MICRO: ABNORMAL /HPF (ref 0–4)

## 2019-01-08 PROCEDURE — 84702 CHORIONIC GONADOTROPIN TEST: CPT

## 2019-01-08 PROCEDURE — 76817 TRANSVAGINAL US OBSTETRIC: CPT

## 2019-01-08 PROCEDURE — 76801 OB US < 14 WKS SINGLE FETUS: CPT

## 2019-01-08 PROCEDURE — 81001 URINALYSIS AUTO W/SCOPE: CPT

## 2019-01-08 PROCEDURE — 81025 URINE PREGNANCY TEST: CPT

## 2019-01-08 PROCEDURE — 99283 EMERGENCY DEPT VISIT LOW MDM: CPT

## 2019-01-08 PROCEDURE — 87086 URINE CULTURE/COLONY COUNT: CPT

## 2019-01-08 RX ORDER — CEPHALEXIN 500 MG/1
500 CAPSULE ORAL 2 TIMES DAILY
Qty: 14 CAP | Refills: 0 | Status: SHIPPED | OUTPATIENT
Start: 2019-01-08 | End: 2019-01-11

## 2019-01-08 NOTE — ED NOTES
Went into room to discharge patient. Pt reports that she does want the ultrasound and is willing to stay.

## 2019-01-08 NOTE — DISCHARGE INSTRUCTIONS
Patient Education        Belly Pain in Pregnancy: Care Instructions  Your Care Instructions    When you're pregnant, any belly pain can be a worry. You may not want to call your doctor about every pain you have. But you don't want to miss something that is dangerous for you or your baby. Even if it feels familiar, belly pain can mean something new when you're pregnant. It's important to know when to call your doctor. It will also help to know how to care for yourself at home when your pain is not caused by anything harmful. · When belly pain is more severe or constant, see a doctor right away. · If you're sure your belly pain is a sign of labor, call your doctor. · When belly pain is brief, it's usually a normal part of pregnancy. It might be related to changes in the growing uterus. Or it could be the stretching of ligaments called round ligaments. These ligaments help support the uterus. Round ligament pain can be on either side of your belly. It can also be felt in your hips or groin. Follow-up care is a key part of your treatment and safety. Be sure to make and go to all appointments, and call your doctor if you are having problems. It's also a good idea to know your test results and keep a list of the medicines you take. How can you tell if belly pain is a sign of labor? When belly pain is caused by labor, it can feel like mild or menstrual-like cramps in your lower belly. These cramps are probably contractions. They can happen in your second or third trimester. You may also have:  · A steady, dull ache in your lower back, pelvis, or thighs. · A feeling of pressure in your pelvis or lower belly. · Changes in your vaginal discharge or a sudden release of fluid from the vagina. If you think you are in labor, call your doctor. How can you care for yourself at home? When belly pain is mild and is not a symptom of labor:  · Rest until you feel better. · Take a warm bath.   · Think about what you drink and eat:  ? Drink plenty of fluids. Choose water and other caffeine-free clear liquids until you feel better. ? Try eating small, frequent meals. If your stomach is upset, try bland, low-fat foods like plain rice, broiled chicken, toast, and yogurt. · Think about how you move if you are having brief pains from stretching of the round ligaments. ? Try gentle stretching. ? Move a little more slowly when turning in bed or getting up from a chair, so those ligaments don't stretch quickly. ? Lean forward a bit if you think you are going to cough or sneeze. When should you call for help? Call 911 anytime you think you may need emergency care. For example, call if:    · You have sudden, severe pain in your belly.     · You have severe vaginal bleeding.    Call your doctor now or seek immediate medical care if:    · You have new or worse belly pain or cramping.     · You have any vaginal bleeding.     · You have a fever.     · You have symptoms of preeclampsia, such as:  ? Sudden swelling of your face, hands, or feet. ? New vision problems (such as dimness or blurring). ? A severe headache.     · You think that you may be in labor. This means that you've had at least 8 contractions within 1 hour or at least 4 contractions within 20 minutes, even after you change your position and drink fluids.     · You have symptoms of a urinary tract infection. These may include:  ? Pain or burning when you urinate. ? A frequent need to urinate without being able to pass much urine. ? Pain in the flank, which is just below the rib cage and above the waist on either side of the back. ? Blood in your urine.    Watch closely for changes in your health, and be sure to contact your doctor if you are worried about your or your baby's health. Where can you learn more? Go to http://byron-isai.info/. Enter 031 163 852 in the search box to learn more about \"Belly Pain in Pregnancy: Care Instructions. \"  Current as of: November 21, 2017  Content Version: 11.8  © 1505-3621 [employer]. Care instructions adapted under license by [employer] (which disclaims liability or warranty for this information). If you have questions about a medical condition or this instruction, always ask your healthcare professional. [address] any warranty or liability for your use of this information.

## 2019-01-08 NOTE — ED PROVIDER NOTES
EMERGENCY DEPARTMENT HISTORY AND PHYSICAL EXAM 
 
Date: 2019 Patient Name: Gisela Chau History of Presenting Illness Chief Complaint Patient presents with  Abdominal Pain  Missed Menses History Provided By: Patient HPI: Gisela Chau is a A0  26 y.o. female with a PMH of chlamydia who presents with acute mild aching bilateral low back and radiating abd pain x 1 day. LMP 2018. No BC and is sexually active. 2 positive home pregnancy tests. Denies vaginal bleeding, urinary sxs, discharge, cp, sob, fever, chills, n/v, lightheadedness, dizziness, headache. No modifying factors or medications. PCP: None Current Outpatient Medications Medication Sig Dispense Refill  prenatal vit no.109-iron-FA 40 mg iron- 1 mg chew Take 1 Tab by mouth daily. 20 Tab 0  cephALEXin (KEFLEX) 500 mg capsule Take 1 Cap by mouth two (2) times a day for 7 days. 14 Cap 0 Past History Past Medical History: 
Past Medical History:  
Diagnosis Date  Chlamydia Past Surgical History: 
History reviewed. No pertinent surgical history. Family History: 
History reviewed. No pertinent family history. Social History: 
Social History Tobacco Use  Smoking status: Current Every Day Smoker Packs/day: 0.25  Smokeless tobacco: Never Used Substance Use Topics  Alcohol use: No  
 Drug use: No  
 
 
Allergies: 
No Known Allergies Review of Systems Review of Systems Constitutional: Negative. Negative for activity change, appetite change, chills and fever. HENT: Negative. Negative for congestion, ear pain, postnasal drip and sore throat. Eyes: Negative. Negative for pain and visual disturbance. Respiratory: Negative. Negative for cough and shortness of breath. Cardiovascular: Negative. Negative for chest pain. Gastrointestinal: Positive for abdominal pain. Negative for anal bleeding, diarrhea, nausea, rectal pain and vomiting. Genitourinary: Positive for menstrual problem. Negative for difficulty urinating, dysuria, flank pain, frequency, pelvic pain, urgency, vaginal bleeding, vaginal discharge and vaginal pain. Musculoskeletal: Positive for back pain. Negative for joint swelling. Skin: Negative. Negative for rash. Neurological: Negative. Negative for dizziness, light-headedness and headaches. Psychiatric/Behavioral: Negative. Physical Exam  
 
Vitals:  
 01/08/19 1224 BP: 133/82 Pulse: 88 Resp: 16 Temp: 98.8 °F (37.1 °C) SpO2: 100% Weight: 62.1 kg (137 lb) Height: 5' (1.524 m) Physical Exam  
Constitutional: She is oriented to person, place, and time. She appears well-developed and well-nourished. No distress. HENT:  
Head: Normocephalic and atraumatic. Right Ear: Hearing and external ear normal.  
Left Ear: Hearing and external ear normal.  
Nose: Nose normal.  
Eyes: Conjunctivae and EOM are normal. Pupils are equal, round, and reactive to light. Neck: Normal range of motion. Cardiovascular: Normal rate, regular rhythm, normal heart sounds and intact distal pulses. Pulmonary/Chest: Effort normal and breath sounds normal. No respiratory distress. She has no wheezes. She has no rales. Abdominal: Soft. Bowel sounds are normal. There is no tenderness. There is no rebound and no guarding. Musculoskeletal: Normal range of motion. Neurological: She is alert and oriented to person, place, and time. Skin: Skin is warm and dry. She is not diaphoretic. Psychiatric: She has a normal mood and affect. Her behavior is normal. Judgment and thought content normal.  
Nursing note and vitals reviewed. Diagnostic Study Results Labs - Recent Results (from the past 12 hour(s)) URINALYSIS W/ REFLEX CULTURE Collection Time: 01/08/19  1:19 PM  
Result Value Ref Range Color YELLOW/STRAW Appearance CLOUDY (A) CLEAR  Specific gravity 1.015 1.003 - 1.030    
 pH (UA) 6.0 5.0 - 8.0 Protein NEGATIVE  NEG mg/dL Glucose NEGATIVE  NEG mg/dL Ketone NEGATIVE  NEG mg/dL Bilirubin NEGATIVE  NEG Blood NEGATIVE  NEG Urobilinogen 0.2 0.2 - 1.0 EU/dL Nitrites NEGATIVE  NEG Leukocyte Esterase TRACE (A) NEG    
 WBC 5-10 0 - 4 /hpf  
 RBC 0-5 0 - 5 /hpf Epithelial cells MANY (A) FEW /lpf Bacteria 2+ (A) NEG /hpf  
 UA:UC IF INDICATED URINE CULTURE ORDERED (A) CNI Mucus 2+ (A) NEG /lpf  
HCG URINE, QL. - POC Collection Time: 01/08/19  1:30 PM  
Result Value Ref Range Pregnancy test,urine (POC) POSITIVE (A) NEG    
BETA HCG, QT Collection Time: 01/08/19  2:05 PM  
Result Value Ref Range Beta HCG, QT 15,360 (H) 0 - 6 MIU/ML Radiologic Studies -  
US PREG UTS < 14 WKS SNGL Final Result IMPRESSION:   
1. Gestational sac without demonstration of a fetal pole at this time. Estimated  
gestational age 10 weeks, 3 days. 2. Isoechoic structure in right ovary measuring 1.9 x 2.2 x 1.7 cm. This can be  
followed on subsequent ultrasound examinations to determine its significance. [address] Final Result IMPRESSION:   
1. Gestational sac without demonstration of a fetal pole at this time. Estimated  
gestational age 10 weeks, 3 days. 2. Isoechoic structure in right ovary measuring 1.9 x 2.2 x 1.7 cm. This can be  
followed on subsequent ultrasound examinations to determine its significance. CT Results  (Last 48 hours) None CXR Results  (Last 48 hours) None Medical Decision Making I am the first provider for this patient. I reviewed the vital signs, available nursing notes, past medical history, past surgical history, family history and social history. Vital Signs-Reviewed the patient's vital signs. Records Reviewed: Nursing Notes, Old Medical Records, Previous Radiology Studies and Previous Laboratory Studies Disposition: 
 
DISCHARGE NOTE:  
 
 
 Follow-up Information Follow up With Specialties Details Why Contact Info Lavelle Hampton MD Obstetrics & Gynecology Schedule an appointment as soon as possible for a visit in 1 week As needed [facility] [address] 
[phone] Current Discharge Medication List  
  
START taking these medications Details  
prenatal vit no.109-iron-FA 40 mg iron- 1 mg chew Take 1 Tab by mouth daily. Qty: 20 Tab, Refills: 0  
  
cephALEXin (KEFLEX) 500 mg capsule Take 1 Cap by mouth two (2) times a day for 7 days. Qty: 14 Cap, Refills: 0 Provider Notes (Medical Decision Making): DDx: pregnancy, uti, ectopic pregnancy, miscarriage Procedures: 
Procedures Diagnosis Clinical Impression: 1. Pregnancy test performed, pregnancy confirmed 2. Urinary tract infection in mother during first trimester of pregnancy

## 2019-01-08 NOTE — ED NOTES
Patient presents to the ED with c/o abdominal pain like cramping today. Pt denies any vaginal bleeding. Pt reports a white vaginal discharge x3-4 days. Pt denies taking any medications. Pt is alert and oriented. Pt skin is warm and dry. Pt is ambulatory independently. Emergency Department Nursing Plan of Care The Nursing Plan of Care is developed from the Nursing assessment and Emergency Department Attending provider initial evaluation. The plan of care may be reviewed in the ED Provider note. The Plan of Care was developed with the following considerations:  
Patient / Family readiness to learn indicated by:verbalized understanding Persons(s) to be included in education: patient Barriers to Learning/Limitations:No 
 
Signed Clara López 1/8/2019   1:12 PM

## 2019-01-10 ENCOUNTER — HOSPITAL ENCOUNTER (EMERGENCY)
Age: 24
Discharge: HOME | End: 2019-01-11
Attending: EMERGENCY MEDICINE
Payer: COMMERCIAL

## 2019-01-10 DIAGNOSIS — J06.9 UPPER RESPIRATORY TRACT INFECTION, UNSPECIFIED TYPE: Primary | ICD-10-CM

## 2019-01-10 DIAGNOSIS — Z3A.01 LESS THAN 8 WEEKS GESTATION OF PREGNANCY: ICD-10-CM

## 2019-01-10 LAB
BACTERIA SPEC CULT: NORMAL
CC UR VC: NORMAL
SERVICE CMNT-IMP: NORMAL

## 2019-01-10 PROCEDURE — 99282 EMERGENCY DEPT VISIT SF MDM: CPT

## 2019-01-11 VITALS
DIASTOLIC BLOOD PRESSURE: 67 MMHG | WEIGHT: 140 LBS | TEMPERATURE: 98.2 F | SYSTOLIC BLOOD PRESSURE: 119 MMHG | BODY MASS INDEX: 27.48 KG/M2 | HEART RATE: 86 BPM | HEIGHT: 60 IN | OXYGEN SATURATION: 97 % | RESPIRATION RATE: 16 BRPM

## 2019-01-11 NOTE — DISCHARGE INSTRUCTIONS
Patient Education        Upper Respiratory Infection (Cold): Care Instructions  Your Care Instructions    An upper respiratory infection, or URI, is an infection of the nose, sinuses, or throat. URIs are spread by coughs, sneezes, and direct contact. The common cold is the most frequent kind of URI. The flu and sinus infections are other kinds of URIs. Almost all URIs are caused by viruses. Antibiotics won't cure them. But you can treat most infections with home care. This may include drinking lots of fluids and taking over-the-counter pain medicine. You will probably feel better in 4 to 10 days. The doctor has checked you carefully, but problems can develop later. If you notice any problems or new symptoms, get medical treatment right away. Follow-up care is a key part of your treatment and safety. Be sure to make and go to all appointments, and call your doctor if you are having problems. It's also a good idea to know your test results and keep a list of the medicines you take. How can you care for yourself at home? · To prevent dehydration, drink plenty of fluids, enough so that your urine is light yellow or clear like water. Choose water and other caffeine-free clear liquids until you feel better. If you have kidney, heart, or liver disease and have to limit fluids, talk with your doctor before you increase the amount of fluids you drink. · Take an over-the-counter pain medicine, such as acetaminophen (Tylenol), ibuprofen (Advil, Motrin), or naproxen (Aleve). Read and follow all instructions on the label. · Before you use cough and cold medicines, check the label. These medicines may not be safe for young children or for people with certain health problems. · Be careful when taking over-the-counter cold or flu medicines and Tylenol at the same time. Many of these medicines have acetaminophen, which is Tylenol. Read the labels to make sure that you are not taking more than the recommended dose.  Too much acetaminophen (Tylenol) can be harmful. · Get plenty of rest.  · Do not smoke or allow others to smoke around you. If you need help quitting, talk to your doctor about stop-smoking programs and medicines. These can increase your chances of quitting for good. When should you call for help? Call 911 anytime you think you may need emergency care. For example, call if:    · You have severe trouble breathing.    Call your doctor now or seek immediate medical care if:    · You seem to be getting much sicker.     · You have new or worse trouble breathing.     · You have a new or higher fever.     · You have a new rash.    Watch closely for changes in your health, and be sure to contact your doctor if:    · You have a new symptom, such as a sore throat, an earache, or sinus pain.     · You cough more deeply or more often, especially if you notice more mucus or a change in the color of your mucus.     · You do not get better as expected. Where can you learn more? Go to http://byron-isai.info/. Enter L670 in the search box to learn more about \"Upper Respiratory Infection (Cold): Care Instructions. \"  Current as of: December 6, 2017  Content Version: 11.8  © 1362-2266 [employer]. Care instructions adapted under license by [employer] (which disclaims liability or warranty for this information). If you have questions about a medical condition or this instruction, always ask your healthcare professional. [place] [address] any warranty or liability for your use of this information.

## 2019-01-11 NOTE — ED PROVIDER NOTES
EMERGENCY DEPARTMENT HISTORY AND PHYSICAL EXAM 
 
 
Date: 1/10/2019 Patient Name: Mitchel Grubbs History of Presenting Illness Chief Complaint Patient presents with  Cold Symptoms History Provided By: Patient HPI: Mitchel Grubbs, 26 y.o. female with no significant PMHx, presents ambulatory to the ED with cc of chills, sore throat, HA, and a mucus productive cough x 1 week, worsening tonight with mild chest pain w/ cough and post-tussive emesis. Pt explains that she is currently ~ 5 weeks pregnant. She notes that she has taken Nyquil and Mucinex for her sx's w/o relief. Pt reports sick contact with her child at home and her sister. She denies any alleviating/exacerbating factors for her current sx's. Pt specifically denies any fever, SOB, CP, nausea, diarrhea, abdominal pain, dysuria, hematuria, or rash. There are no other complaints, changes, or physical findings at this time. Social Hx: - EtOH; + Smoker (1/4 ppd); - Illicit Drugs PCP: None Past History Past Medical History: 
Past Medical History:  
Diagnosis Date  Chlamydia Past Surgical History: 
History reviewed. No pertinent surgical history. Family History: 
History reviewed. No pertinent family history. Social History: 
Social History Tobacco Use  Smoking status: Current Every Day Smoker Packs/day: 0.25  Smokeless tobacco: Never Used Substance Use Topics  Alcohol use: No  
 Drug use: No  
 
 
Allergies: 
No Known Allergies Review of Systems Review of Systems Constitutional: Negative. Negative for chills, fever and unexpected weight change. HENT: Positive for congestion, rhinorrhea and sore throat. Negative for trouble swallowing. Eyes: Negative for discharge. Respiratory: Positive for cough. Negative for chest tightness and shortness of breath. Cardiovascular: Positive for chest pain. Gastrointestinal: Positive for vomiting (post-tussive emesis).  Negative for abdominal distention, abdominal pain, constipation, diarrhea and nausea. Endocrine: Negative. Genitourinary: Negative. Negative for difficulty urinating, dysuria, frequency, hematuria and urgency. Musculoskeletal: Negative. Negative for arthralgias and myalgias. Skin: Negative. Negative for color change and rash. Allergic/Immunologic: Negative. Neurological: Negative. Negative for dizziness, speech difficulty and headaches. Hematological: Negative. Psychiatric/Behavioral: Negative. Negative for agitation and confusion. All other systems reviewed and are negative. Physical Exam  
Physical Exam  
Constitutional: She is oriented to person, place, and time. She appears well-developed and well-nourished. HENT:  
Head: Normocephalic and atraumatic. Nose: Rhinorrhea present. Mouth/Throat: Posterior oropharyngeal erythema present. Audible upper respiratory congestion Tonsillar enlargement Eyes: Conjunctivae and EOM are normal.  
Neck: Neck supple. No meningismus Cardiovascular: Normal rate, regular rhythm and intact distal pulses. Pulmonary/Chest: Effort normal. No respiratory distress. She exhibits no tenderness. Lungs CTA Abdominal: Soft. There is no tenderness. Musculoskeletal: Normal range of motion. She exhibits no deformity. Lymphadenopathy:  
  She has cervical adenopathy. Neurological: She is alert and oriented to person, place, and time. Skin: Skin is warm and dry. Psychiatric: She has a normal mood and affect. Her behavior is normal. Thought content normal.  
Vitals reviewed. Medical Decision Making I am the first provider for this patient. I reviewed the vital signs, available nursing notes, past medical history, past surgical history, family history and social history. Vital Signs-Reviewed the patient's vital signs. Patient Vitals for the past 12 hrs: 
 Temp Pulse Resp BP SpO2  
01/10/19 2355 98.2 °F (36.8 °C) 86 16 119/67 97 % Pulse Oximetry Analysis - 97% on RA Cardiac Monitor:  
Rate: 86 bpm 
Rhythm: Normal Sinus Rhythm Records Reviewed: Nursing Notes and Old Medical Records Provider Notes (Medical Decision Making): DDx: URI, bronchitis, pharyngitis ED Course:  
Initial assessment performed. The patients presenting problems have been discussed, and they are in agreement with the care plan formulated and outlined with them. I have encouraged them to ask questions as they arise throughout their visit. Critical Care Time:  
0 Disposition: 
Discharge Note: 
12:16 AM 
The patient has been re-evaluated and is ready for discharge. Reviewed available results with patient. Counseled patient/parent/guardian on diagnosis and care plan. Patient has expressed understanding, and all questions have been answered. Patient agrees with plan and agrees to follow up as recommended, or return to the ED if their symptoms worsen. Discharge instructions have been provided and explained to the patient, along with reasons to return to the ED. PLAN: 
1. Discharge Medication List as of 1/11/2019 12:16 AM  
  
START taking these medications Details  
prenatal multivit-ca-min-fe-fa (PRENATAL VITAMIN) tab Take 1 Tab by mouth daily. , Print, Disp-30 Tab, R-0  
  
chlorpheniramine-acetaminophen (CORICIDIN HBP COLD AND FLU) 2-325 mg tab Take 1 Tab by mouth two (2) times daily as needed. , Print, Disp-14 Tab, R-0  
  
dextromethorphan-guaiFENesin 5-100 mg/5 mL liqd Take 5 mL by mouth two (2) times daily as needed. , Print, Disp-100 mL, R-0  
  
phenol-glycerin (CHLORASEPTIC MAX SORE THROAT) 1.5-33 % spry 1 Spray by Mucous Membrane route three (3) times daily as needed. , Print, Disp-1 Bottle, R-0  
  
  
 
2. Follow-up Information Follow up With Specialties Details Why Contact Kera Gale  Schedule an appointment as soon as possible for a visit  [facility] [address] [phone] [facility] EMERGENCY DEPT Emergency Medicine  As needed, If symptoms worsen [address] Return to ED if worse Diagnosis Clinical Impression: 1. Upper respiratory tract infection, unspecified type 2. Less than 8 weeks gestation of pregnancy This note is prepared by Johanna Briscoe. Chau Kay, acting as Scribe for Curry Escamilla. Dayna Watson MD, Tessa Watson MD: The scribe's documentation has been prepared under my direction and personally reviewed by me in its entirety. I confirm that the note above accurately reflects all work, treatment, procedures, and medical decision making performed by me. This note will not be viewable in [address]

## 2019-01-11 NOTE — ED NOTES
Discharge instructions were given to the patient by Carlos Alberto Lan RN. The patient left the Emergency Department ambulatory, alert and oriented and in no acute distress with 4 prescriptions. The patient was encouraged to call or return to the ED for worsening issues or problems and was encouraged to schedule a follow up appointment for continuing care. The patient verbalized understanding of discharge instructions and prescriptions, all questions were answered. The patient has no further concerns at this time.

## 2019-01-11 NOTE — ED NOTES
Pt presents ambulatory to ED complaining of cold symptoms x 1 week. Symptoms include congestion, cough, and chest pain with cough. Pt states that she is 5 weeks pregnant. She says her child at home and sister were both sick. Pt reports taking mucinex and night quill around 2100 yesterday. Pt is alert and oriented x 4, RR even and unlabored, skin is warm and dry. Assesment completed and pt updated on plan of care. Emergency Department Nursing Plan of Care The Nursing Plan of Care is developed from the Nursing assessment and Emergency Department Attending provider initial evaluation. The plan of care may be reviewed in the ED Provider note. The Plan of Care was developed with the following considerations:  
Patient / Family readiness to learn indicated by:verbalized understanding Persons(s) to be included in education: patient Barriers to Learning/Limitations:No 
 
Signed [address], RN   
1/11/2019   12:03 AM

## 2019-03-09 ENCOUNTER — HOSPITAL ENCOUNTER (EMERGENCY)
Age: 24
Discharge: HOME | End: 2019-03-09
Attending: INTERNAL MEDICINE
Payer: COMMERCIAL

## 2019-03-09 VITALS
HEART RATE: 64 BPM | SYSTOLIC BLOOD PRESSURE: 154 MMHG | TEMPERATURE: 97.2 F | BODY MASS INDEX: 28.07 KG/M2 | OXYGEN SATURATION: 100 % | HEIGHT: 60 IN | WEIGHT: 143 LBS | RESPIRATION RATE: 14 BRPM | DIASTOLIC BLOOD PRESSURE: 93 MMHG

## 2019-03-09 DIAGNOSIS — S39.012A BACK STRAIN, INITIAL ENCOUNTER: Primary | ICD-10-CM

## 2019-03-09 LAB — HCG UR QL: NEGATIVE

## 2019-03-09 PROCEDURE — 81025 URINE PREGNANCY TEST: CPT

## 2019-03-09 PROCEDURE — 99283 EMERGENCY DEPT VISIT LOW MDM: CPT

## 2019-03-09 RX ORDER — IBUPROFEN 600 MG/1
600 TABLET ORAL
Qty: 30 TAB | Refills: 0 | Status: SHIPPED | OUTPATIENT
Start: 2019-03-09 | End: 2019-05-19

## 2019-03-09 NOTE — DISCHARGE INSTRUCTIONS

## 2019-03-09 NOTE — ED NOTES
Discharge instructions were given to the patient by Hilda Shen RN  . The patient left the Emergency Department ambulatory, alert and oriented and in no acute distress with one prescriptions. The patient was encouraged to call or return to the ED for worsening issues or problems and was encouraged to schedule a follow up appointment for continuing care. The patient verbalized understanding of discharge instructions and prescriptions, all questions were answered. The patient has no further concerns at this time.

## 2019-03-09 NOTE — ED NOTES
Pt presents to ED ambulatory complaining of upper back pain, no injury. Pt is alert and oriented x 4, RR even and unlabored, skin is warm and dry. Assessment completed and pt updated on plan of care. Emergency Department Nursing Plan of Care       The Nursing Plan of Care is developed from the Nursing assessment and Emergency Department Attending provider initial evaluation. The plan of care may be reviewed in the ED Provider note.     The Plan of Care was developed with the following considerations:   Patient / Family readiness to learn indicated by:verbalized understanding  Persons(s) to be included in education: patient  Barriers to Learning/Limitations:No    Signed     Kimberli Oshea, RN    3/9/2019   3:40 AM

## 2019-03-09 NOTE — ED PROVIDER NOTES
Comes to ER with complaints with upper back pain, between shoulder blade, NO CP, NO SOB, NO F/C/N/V/D/C. NO recent illness. Back Pain           Past Medical History:   Diagnosis Date    Chlamydia        No past surgical history on file. No family history on file. Social History     Socioeconomic History    Marital status: SINGLE     Spouse name: Not on file    Number of children: Not on file    Years of education: Not on file    Highest education level: Not on file   Social Needs    Financial resource strain: Not on file    Food insecurity - worry: Not on file    Food insecurity - inability: Not on file    Transportation needs - medical: Not on file   [employer] needs - non-medical: Not on file   Occupational History    Not on file   Tobacco Use    Smoking status: Current Every Day Smoker     Packs/day: 0.25    Smokeless tobacco: Never Used   Substance and Sexual Activity    Alcohol use: No    Drug use: No    Sexual activity: Yes     Partners: Male     Birth control/protection: IUD   Other Topics Concern    Not on file   Social History Narrative    Not on file         ALLERGIES: Patient has no known allergies. Review of Systems   Constitutional: Negative. HENT: Negative. Eyes: Negative. Respiratory: Negative. Gastrointestinal: Negative. Genitourinary: Negative. Musculoskeletal: Positive for back pain. All other systems reviewed and are negative. Vitals:    03/09/19 0319   BP: (!) 154/93   Pulse: 64   Resp: 14   Temp: 97.2 °F (36.2 °C)   SpO2: 100%   Weight: 64.9 kg (143 lb)   Height: 5' (1.524 m)            Physical Exam   Constitutional: She is oriented to person, place, and time. She appears well-developed and well-nourished. HENT:   Head: Normocephalic and atraumatic. Right Ear: External ear normal.   Left Ear: External ear normal.   Mouth/Throat: Oropharynx is clear and moist. No oropharyngeal exudate.    Eyes: Conjunctivae and EOM are normal. Pupils are equal, round, and reactive to light. Right eye exhibits no discharge. Left eye exhibits no discharge. No scleral icterus. Neck: Normal range of motion. No tracheal deviation present. No thyromegaly present. Cardiovascular: Normal rate, regular rhythm and normal heart sounds. No murmur heard. Pulmonary/Chest: Effort normal and breath sounds normal. No respiratory distress. She has no wheezes. She has no rales. She exhibits no tenderness. Abdominal: Soft. Bowel sounds are normal. She exhibits no distension. There is no tenderness. There is no rebound and no guarding. Musculoskeletal: Normal range of motion. She exhibits no edema or tenderness. Lymphadenopathy:     She has no cervical adenopathy. Neurological: She is alert and oriented to person, place, and time. No cranial nerve deficit. Coordination normal.   Skin: Skin is warm. No erythema. Psychiatric: She has a normal mood and affect. Her behavior is normal. Judgment and thought content normal.   Nursing note and vitals reviewed. MDM  Number of Diagnoses or Management Options  Diagnosis management comments: Back strain, PNA, muscle sprain. Procedures    LABORATORY TESTS:  No results found for this or any previous visit (from the past 12 hour(s)). IMAGING RESULTS:  No orders to display       MEDICATIONS GIVEN:  Medications - No data to display    IMPRESSION:  No diagnosis found. PLAN:  1. Current Discharge Medication List        2.    Follow-up Information    None       Return to ED if worse

## 2019-05-18 PROCEDURE — 99283 EMERGENCY DEPT VISIT LOW MDM: CPT

## 2019-05-19 ENCOUNTER — APPOINTMENT (OUTPATIENT)
Dept: GENERAL RADIOLOGY | Age: 24
End: 2019-05-19
Attending: EMERGENCY MEDICINE
Payer: COMMERCIAL

## 2019-05-19 ENCOUNTER — HOSPITAL ENCOUNTER (EMERGENCY)
Age: 24
Discharge: HOME | End: 2019-05-19
Attending: EMERGENCY MEDICINE
Payer: COMMERCIAL

## 2019-05-19 VITALS
SYSTOLIC BLOOD PRESSURE: 143 MMHG | RESPIRATION RATE: 18 BRPM | WEIGHT: 143 LBS | TEMPERATURE: 98.3 F | HEART RATE: 89 BPM | HEIGHT: 60 IN | OXYGEN SATURATION: 99 % | BODY MASS INDEX: 28.07 KG/M2 | DIASTOLIC BLOOD PRESSURE: 92 MMHG

## 2019-05-19 DIAGNOSIS — S20.219A CONTUSION OF CHEST WALL, UNSPECIFIED LATERALITY, INITIAL ENCOUNTER: Primary | ICD-10-CM

## 2019-05-19 LAB — HCG UR QL: NEGATIVE

## 2019-05-19 PROCEDURE — 81025 URINE PREGNANCY TEST: CPT

## 2019-05-19 PROCEDURE — 71111 X-RAY EXAM RIBS/CHEST4/> VWS: CPT

## 2019-05-19 RX ORDER — IBUPROFEN 800 MG/1
800 TABLET ORAL
Qty: 30 TAB | Refills: 0 | Status: ON HOLD | OUTPATIENT
Start: 2019-05-19 | End: 2019-09-30
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
## 2019-05-19 NOTE — DISCHARGE INSTRUCTIONS
Patient Education        Chest Contusion: Care Instructions  Your Care Instructions  A chest contusion, or bruise, is caused by a fall or direct blow to the chest. Car crashes, falls, getting punched, and injury from bicycle handlebars are common causes of chest contusions. A very forceful blow to the chest can injure the heart or blood vessels in the chest, the lungs, the airway, the liver, or the spleen. Pain may be caused by an injury to muscles, cartilage, or ribs. Deep breathing, coughing, or sneezing can increase your pain. Lying on the injured area also can cause pain. Follow-up care is a key part of your treatment and safety. Be sure to make and go to all appointments, and call your doctor if you are having problems. It's also a good idea to know your test results and keep a list of the medicines you take. How can you care for yourself at home? · Rest and protect the injured or sore area. Stop, change, or take a break from any activity that may be causing your pain. · Put ice or a cold pack on the area for 10 to 20 minutes at a time. Put a thin cloth between the ice and your skin. · After 2 or 3 days, if your swelling is gone, apply a heating pad set on low or a warm cloth to your chest. Some doctors suggest that you go back and forth between hot and cold. Put a thin cloth between the heating pad and your skin. · Do not wrap or tape your ribs for support. This may cause you to take smaller breaths, which could increase your risk of pneumonia and lung collapse. · Ask your doctor if you can take an over-the-counter pain medicine, such as acetaminophen (Tylenol), ibuprofen (Advil, Motrin), or naproxen (Aleve). Be safe with medicines. Read and follow all instructions on the label. · Take your medicines exactly as prescribed. Call your doctor if you think you are having a problem with your medicine.   · Gentle stretching and massage may help you feel better after a few days of rest. Stretch slowly to the point just before discomfort begins, then hold the stretch for at least 15 to 30 seconds. Do this 3 or 4 times per day. · As your pain gets better, slowly return to your normal activities. Be patient, because chest bruises can take weeks or months to heal. Any increased pain may be a sign that you need to rest a while longer. When should you call for help? Call 911 anytime you think you may need emergency care. For example, call if:    · You have severe trouble breathing.     · You cough up blood.    Call your doctor now or seek immediate medical care if:    · You have belly pain.     · You are dizzy or lightheaded, or you feel like you may faint.     · You develop new symptoms with the chest pain.     · Your chest pain gets worse.     · You have a fever.     · You have some shortness of breath.     · You have a cough that brings up mucus from the lungs.    Watch closely for changes in your health, and be sure to contact your doctor if:    · Your chest pain is not improving after 1 week. Where can you learn more? Go to http://byron-isai.info/. Enter I174 in the search box to learn more about \"Chest Contusion: Care Instructions. \"  Current as of: September 23, 2018  Content Version: 11.9  © 7795-1498 [employer]. Care instructions adapted under license by [employer] (which disclaims liability or warranty for this information). If you have questions about a medical condition or this instruction, always ask your healthcare professional. [place] [address] any warranty or liability for your use of this information.

## 2019-05-19 NOTE — ED NOTES
Pt presents to ED via EMS complaining of bilateral rib pain starting late 5/18/2019 in the evening. Per EMS, pt denies injury. Noted that pt told triage nurse she was cleaning when the pain started. When questioned if she fell by this RN pt states \"something like that. \"  Pt did not say anything else, maintained a flat affect and avoided eye contact. Pt is alert and oriented x 4, RR even and unlabored, skin is warm and dry. Assessment completed and pt updated on plan of care. Emergency Department Nursing Plan of Care The Nursing Plan of Care is developed from the Nursing assessment and Emergency Department Attending provider initial evaluation. The plan of care may be reviewed in the ED Provider note. The Plan of Care was developed with the following considerations:  
Patient / Family readiness to learn indicated by:verbalized understanding Persons(s) to be included in education: patient Barriers to Learning/Limitations:No 
 
Signed Terra Tirado RN   
5/19/2019   1:00 AM

## 2019-05-19 NOTE — ED NOTES
Discharge instructions were given to the patient by provider. The patient left the Emergency Department ambulatory, alert and oriented and in no acute distress with 1 prescriptions. The patient was encouraged to call or return to the ED for worsening issues or problems and was encouraged to schedule a follow up appointment for continuing care. The patient verbalized understanding of discharge instructions and prescriptions, all questions were answered. The patient has no further concerns at this time.

## 2019-05-19 NOTE — ED TRIAGE NOTES
Patient reports to ed with reports of right and left \"rib pain\" x today. Patient denies recent injury patient reports \"I was cleaning up earlier and it just started hurting\". Patient reports 10/10 sharp pain.

## 2019-07-04 ENCOUNTER — HOSPITAL ENCOUNTER (EMERGENCY)
Age: 24
Discharge: HOME | End: 2019-07-04
Attending: EMERGENCY MEDICINE
Payer: COMMERCIAL

## 2019-07-04 ENCOUNTER — APPOINTMENT (OUTPATIENT)
Dept: ULTRASOUND IMAGING | Age: 24
End: 2019-07-04
Attending: EMERGENCY MEDICINE
Payer: COMMERCIAL

## 2019-07-04 VITALS
SYSTOLIC BLOOD PRESSURE: 122 MMHG | HEART RATE: 82 BPM | TEMPERATURE: 97.9 F | RESPIRATION RATE: 18 BRPM | DIASTOLIC BLOOD PRESSURE: 75 MMHG | HEIGHT: 60 IN | WEIGHT: 133 LBS | BODY MASS INDEX: 26.11 KG/M2 | OXYGEN SATURATION: 98 %

## 2019-07-04 DIAGNOSIS — R10.2 PELVIC PAIN DURING PREGNANCY: Primary | ICD-10-CM

## 2019-07-04 DIAGNOSIS — O26.899 PELVIC PAIN DURING PREGNANCY: Primary | ICD-10-CM

## 2019-07-04 LAB
APPEARANCE UR: CLEAR
BACTERIA URNS QL MICRO: NEGATIVE /HPF
BASOPHILS # BLD: 0 K/UL (ref 0–0.1)
BASOPHILS NFR BLD: 1 % (ref 0–1)
BILIRUB UR QL: NEGATIVE
BNP SERPL-MCNC: 14 PG/ML (ref 0–125)
CLUE CELLS VAG QL WET PREP: NORMAL
COLOR UR: ABNORMAL
DIFFERENTIAL METHOD BLD: NORMAL
EOSINOPHIL # BLD: 0.2 K/UL (ref 0–0.4)
EOSINOPHIL NFR BLD: 4 % (ref 0–7)
EPITH CASTS URNS QL MICRO: ABNORMAL /LPF
ERYTHROCYTE [DISTWIDTH] IN BLOOD BY AUTOMATED COUNT: 11.7 % (ref 11.5–14.5)
GLUCOSE UR STRIP.AUTO-MCNC: NEGATIVE MG/DL
HCG SERPL-ACNC: 424 MIU/ML (ref 0–6)
HCG UR QL: POSITIVE
HCT VFR BLD AUTO: 37.5 % (ref 35–47)
HGB BLD-MCNC: 12.3 G/DL (ref 11.5–16)
HGB UR QL STRIP: NEGATIVE
IMM GRANULOCYTES # BLD AUTO: 0 K/UL (ref 0–0.04)
IMM GRANULOCYTES NFR BLD AUTO: 0 % (ref 0–0.5)
KETONES UR QL STRIP.AUTO: 15 MG/DL
KOH PREP SPEC: NORMAL
LEUKOCYTE ESTERASE UR QL STRIP.AUTO: NEGATIVE
LYMPHOCYTES # BLD: 2.3 K/UL (ref 0.8–3.5)
LYMPHOCYTES NFR BLD: 45 % (ref 12–49)
MCH RBC QN AUTO: 29.9 PG (ref 26–34)
MCHC RBC AUTO-ENTMCNC: 32.8 G/DL (ref 30–36.5)
MCV RBC AUTO: 91 FL (ref 80–99)
MONOCYTES # BLD: 0.3 K/UL (ref 0–1)
MONOCYTES NFR BLD: 6 % (ref 5–13)
MUCOUS THREADS URNS QL MICRO: ABNORMAL /LPF
NEUTS SEG # BLD: 2.3 K/UL (ref 1.8–8)
NEUTS SEG NFR BLD: 44 % (ref 32–75)
NITRITE UR QL STRIP.AUTO: NEGATIVE
NRBC # BLD: 0 K/UL (ref 0–0.01)
NRBC BLD-RTO: 0 PER 100 WBC
PH UR STRIP: 5.5 [PH] (ref 5–8)
PLATELET # BLD AUTO: 289 K/UL (ref 150–400)
PMV BLD AUTO: 10 FL (ref 8.9–12.9)
PROT UR STRIP-MCNC: NEGATIVE MG/DL
RBC # BLD AUTO: 4.12 M/UL (ref 3.8–5.2)
RBC #/AREA URNS HPF: ABNORMAL /HPF (ref 0–5)
SERVICE CMNT-IMP: NORMAL
SP GR UR REFRACTOMETRY: 1.02 (ref 1–1.03)
T VAGINALIS VAG QL WET PREP: NORMAL
UA: UC IF INDICATED,UAUC: ABNORMAL
UROBILINOGEN UR QL STRIP.AUTO: 0.2 EU/DL (ref 0.2–1)
WBC # BLD AUTO: 5.2 K/UL (ref 3.6–11)
WBC URNS QL MICRO: ABNORMAL /HPF (ref 0–4)

## 2019-07-04 PROCEDURE — 87491 CHLMYD TRACH DNA AMP PROBE: CPT

## 2019-07-04 PROCEDURE — 81025 URINE PREGNANCY TEST: CPT

## 2019-07-04 PROCEDURE — 83880 ASSAY OF NATRIURETIC PEPTIDE: CPT

## 2019-07-04 PROCEDURE — 99284 EMERGENCY DEPT VISIT MOD MDM: CPT

## 2019-07-04 PROCEDURE — 76817 TRANSVAGINAL US OBSTETRIC: CPT

## 2019-07-04 PROCEDURE — 87210 SMEAR WET MOUNT SALINE/INK: CPT

## 2019-07-04 PROCEDURE — 85025 COMPLETE CBC W/AUTO DIFF WBC: CPT

## 2019-07-04 PROCEDURE — 84702 CHORIONIC GONADOTROPIN TEST: CPT

## 2019-07-04 PROCEDURE — 36415 COLL VENOUS BLD VENIPUNCTURE: CPT

## 2019-07-04 PROCEDURE — 81001 URINALYSIS AUTO W/SCOPE: CPT

## 2019-07-04 NOTE — ED NOTES
Pt presents to ED ambulatory complaining of vaginal discharge and abdominal pain x yesterday. Pt reports white vaginal discharge with left-sided abdominal pain. Pt reporting nausea with no vomiting. Pt had positive pregnancy test in ED today. Pt is alert and oriented x 4, RR even and unlabored, skin is warm and dry. Assessment completed and pt updated on plan of care. Emergency Department Nursing Plan of Care       The Nursing Plan of Care is developed from the Nursing assessment and Emergency Department Attending provider initial evaluation. The plan of care may be reviewed in the ED Provider note.     The Plan of Care was developed with the following considerations:   Patient / Family readiness to learn indicated by:verbalized understanding  Persons(s) to be included in education: patient  Barriers to Learning/Limitations:No    Signed     Lucero Abraham    7/4/2019   12:49 AM

## 2019-07-04 NOTE — ED PROVIDER NOTES
27-year-old female presents with left lower quadrant pain which began yesterday and is 10 out of 10 and worse with movement. .  She reports associated vaginal discharge. Had a, dysuria, fever, vomiting, diarrhea. She does report some nausea 2 or 3 days ago. LMP was Tabitha 3. Past Medical History:   Diagnosis Date    Chlamydia        History reviewed. No pertinent surgical history. History reviewed. No pertinent family history. Social History     Socioeconomic History    Marital status: SINGLE     Spouse name: Not on file    Number of children: Not on file    Years of education: Not on file    Highest education level: Not on file   Occupational History    Not on file   Social Needs    Financial resource strain: Not on file    Food insecurity:     Worry: Not on file     Inability: Not on file    Transportation needs:     Medical: Not on file     Non-medical: Not on file   Tobacco Use    Smoking status: Current Every Day Smoker     Packs/day: 0.25    Smokeless tobacco: Never Used   Substance and Sexual Activity    Alcohol use: No     Frequency: Never    Drug use: No    Sexual activity: Yes     Partners: Male     Birth control/protection: IUD   Lifestyle    Physical activity:     Days per week: Not on file     Minutes per session: Not on file    Stress: Not on file   Relationships    Social connections:     Talks on phone: Not on file     Gets together: Not on file     Attends [employer] service: Not on file     Active member of club or organization: Not on file     Attends meetings of clubs or organizations: Not on file     Relationship status: Not on file    Intimate partner violence:     Fear of current or ex partner: Not on file     Emotionally abused: Not on file     Physically abused: Not on file     Forced sexual activity: Not on file   Other Topics Concern    Not on file   Social History Narrative    Not on file         ALLERGIES: Patient has no known allergies.     Review of Systems   Constitutional: Negative. Negative for chills, fever and unexpected weight change. HENT: Negative. Negative for congestion and trouble swallowing. Eyes: Negative for discharge. Respiratory: Negative. Negative for cough, chest tightness and shortness of breath. Cardiovascular: Negative. Negative for chest pain. Gastrointestinal: Positive for abdominal pain. Negative for abdominal distention, constipation, diarrhea and nausea. Endocrine: Negative. Genitourinary: Positive for pelvic pain and vaginal discharge. Negative for difficulty urinating, dysuria, frequency and urgency. Musculoskeletal: Negative. Negative for arthralgias and myalgias. Skin: Negative. Negative for color change. Allergic/Immunologic: Negative. Neurological: Negative. Negative for dizziness, speech difficulty and headaches. Hematological: Negative. Psychiatric/Behavioral: Negative. Negative for agitation and confusion. All other systems reviewed and are negative. Vitals:    07/04/19 0026   BP: 122/75   Pulse: 82   Resp: 18   Temp: 97.9 °F (36.6 °C)   SpO2: 98%   Weight: 60.3 kg (133 lb)   Height: 5' (1.524 m)            Physical Exam   Constitutional: She is oriented to person, place, and time. She appears well-developed and well-nourished. HENT:   Head: Normocephalic and atraumatic. Eyes: Conjunctivae and EOM are normal.   Neck: Neck supple. Cardiovascular: Normal rate, regular rhythm and intact distal pulses. Pulmonary/Chest: Effort normal. No respiratory distress. Abdominal: Soft. Bowel sounds are normal. She exhibits no mass. There is no tenderness. There is no rebound and no guarding. No hernia. Mild right lower quadrant tenderness. No left lower quadrant tenderness   Musculoskeletal: Normal range of motion. She exhibits no deformity. Neurological: She is alert and oriented to person, place, and time. Skin: Skin is warm and dry.    Psychiatric: She has a normal mood and affect. Her behavior is normal. Thought content normal.   Vitals reviewed. MDM  Number of Diagnoses or Management Options  Pelvic pain during pregnancy:   Diagnosis management comments: Ovarian cyst, discomforts of pregnancy, rule out ectopic, cervicitis, diverticulitis, UTI, renal colic, Vincenzo, PID         Procedures    LABORATORY TESTS:  No results found for this or any previous visit (from the past 12 hour(s)). IMAGING RESULTS:  US UTS TRANSVAGINAL OB   Final Result   IMPRESSION:   No visualized gestational sac or fetal pole within the endometrial canal. Small   amount of fluid within the endometrial canal. Nonvisualization of the left   ovary. No visualized adnexal mass or pelvic free fluid. Clinical follow-up is   recommended. MEDICATIONS GIVEN:  Medications - No data to display    IMPRESSION:  1. Pelvic pain during pregnancy        PLAN:  1. Discharge Medication List as of 7/4/2019  2:29 AM      START taking these medications    Details   prenatal multivit-ca-min-fe-fa (PRENATAL VITAMIN) tab Take 1 Tab by mouth daily. , Print, Disp-30 Tab, R-0         CONTINUE these medications which have NOT CHANGED    Details   ibuprofen (MOTRIN) 800 mg tablet Take 1 Tab by mouth every eight (8) hours as needed for Pain., Normal, Disp-30 Tab, R-0           2. Follow-up Information     Follow up With Specialties Details Why Contact Info    [facility]  Schedule an appointment as soon as possible for a visit  [address] [address]  [phone]    [facility] EMERGENCY DEPT Emergency Medicine  As needed, If symptoms worsen [address] [facility]  [phone]        Return to ED if worse

## 2019-07-04 NOTE — ED NOTES
Discharge instructions were given to the patient by Lucero Abraham RN. The patient left the Emergency Department ambulatory, alert and oriented and in no acute distress with 1 prescription. The patient was encouraged to call or return to the ED for worsening issues or problems and was encouraged to schedule a follow up appointment for continuing care. The patient verbalized understanding of discharge instructions and prescriptions, all questions were answered. The patient has no further concerns at this time.

## 2019-07-04 NOTE — DISCHARGE INSTRUCTIONS
Patient Education        Patient Education     Belly Pain in Pregnancy: Care Instructions  Your Care Instructions  When you're pregnant, any belly pain can be a worry. You may not want to call your doctor about every pain you have. But you don't want to miss something that is dangerous for you or your baby. Even if it feels familiar, belly pain can mean something new when you're pregnant. It's important to know when to call your doctor. It will also help to know how to care for yourself at home when your pain is not caused by anything harmful. · When belly pain is more severe or constant, see a doctor right away. · If you're sure your belly pain is a sign of labor, call your doctor. · When belly pain is brief, it's usually a normal part of pregnancy. It might be related to changes in the growing uterus. Or it could be the stretching of ligaments called round ligaments. These ligaments help support the uterus. Round ligament pain can be on either side of your belly. It can also be felt in your hips or groin. Follow-up care is a key part of your treatment and safety. Be sure to make and go to all appointments, and call your doctor if you are having problems. It's also a good idea to know your test results and keep a list of the medicines you take. How can you tell if belly pain is a sign of labor? When belly pain is caused by labor, it can feel like mild or menstrual-like cramps in your lower belly. These cramps are probably contractions. They can happen in your second or third trimester. You may also have:  · A steady, dull ache in your lower back, pelvis, or thighs. · A feeling of pressure in your pelvis or lower belly. · Changes in your vaginal discharge or a sudden release of fluid from the vagina. If you think you are in labor, call your doctor. How can you care for yourself at home? When belly pain is mild and is not a symptom of labor:  · Rest until you feel better. · Take a warm bath.   · Think about what you drink and eat:  ¨ Drink plenty of fluids. Choose water and other caffeine-free clear liquids until you feel better. ¨ Try eating small, frequent meals. If your stomach is upset, try bland, low-fat foods like plain rice, broiled chicken, toast, and yogurt. · Think about how you move if you are having brief pains from stretching of the round ligaments. ¨ Try gentle stretching. ¨ Move a little more slowly when turning in bed or getting up from a chair, so those ligaments don't stretch quickly. ¨ Lean forward a bit if you think you are going to cough or sneeze. When should you call for help? Call 911 anytime you think you may need emergency care. For example, call if:  · You have sudden, severe pain in your belly. · You have severe vaginal bleeding. Call your doctor now or seek immediate medical care if:  · You have new or worse belly pain or cramping. · You have any vaginal bleeding. · You have a fever. · You have symptoms of preeclampsia, such as:  ¨ Sudden swelling of your face, hands, or feet. ¨ New vision problems (such as dimness or blurring). ¨ A severe headache. · You think that you may be in labor. This means that you've had at least 8 contractions within 1 hour or at least 4 contractions within 20 minutes, even after you change your position and drink fluids. · You have symptoms of a urinary tract infection. These may include:  ¨ Pain or burning when you urinate. ¨ A frequent need to urinate without being able to pass much urine. ¨ Pain in the flank, which is just below the rib cage and above the waist on either side of the back. ¨ Blood in your urine. Watch closely for changes in your health, and be sure to contact your doctor if you are worried about your or your baby's health. Where can you learn more? Go to [employer]  Enter B275 in the search box to learn more about \"Belly Pain in Pregnancy: Care Instructions. \"   © 0407-8911 [employer]wise, Incorporated. Care instructions adapted under license by [facility] (which disclaims liability or warranty for this information). This care instruction is for use with your licensed healthcare professional. If you have questions about a medical condition or this instruction, always ask your healthcare professional. [address] any warranty or liability for your use of this information.   Content Version: 51.7.673241; Current as of: November 12, 2015

## 2019-09-30 ENCOUNTER — HOSPITAL ENCOUNTER (EMERGENCY)
Age: 24
Discharge: ANOTHER HOSPITAL | End: 2019-09-30
Attending: EMERGENCY MEDICINE
Payer: COMMERCIAL

## 2019-09-30 ENCOUNTER — HOSPITAL ENCOUNTER (INPATIENT)
Age: 24
LOS: 3 days | Discharge: HOME | DRG: 566 | End: 2019-10-03
Attending: PSYCHIATRY & NEUROLOGY | Admitting: PSYCHIATRY & NEUROLOGY
Payer: COMMERCIAL

## 2019-09-30 VITALS
BODY MASS INDEX: 28.32 KG/M2 | DIASTOLIC BLOOD PRESSURE: 58 MMHG | TEMPERATURE: 98.1 F | WEIGHT: 144.25 LBS | RESPIRATION RATE: 16 BRPM | OXYGEN SATURATION: 99 % | HEIGHT: 60 IN | HEART RATE: 82 BPM | SYSTOLIC BLOOD PRESSURE: 117 MMHG

## 2019-09-30 DIAGNOSIS — F32.A DEPRESSION, UNSPECIFIED DEPRESSION TYPE: ICD-10-CM

## 2019-09-30 DIAGNOSIS — Z34.90 PREGNANCY, UNSPECIFIED GESTATIONAL AGE: ICD-10-CM

## 2019-09-30 DIAGNOSIS — R45.851 SUICIDAL IDEATION: Primary | ICD-10-CM

## 2019-09-30 PROBLEM — F32.9 MAJOR DEPRESSION: Status: ACTIVE | Noted: 2019-09-30

## 2019-09-30 LAB
ALBUMIN SERPL-MCNC: 3 G/DL (ref 3.5–5)
ALBUMIN/GLOB SERPL: 0.8 {RATIO} (ref 1.1–2.2)
ALP SERPL-CCNC: 35 U/L (ref 45–117)
ALT SERPL-CCNC: 14 U/L (ref 12–78)
AMPHET UR QL SCN: NEGATIVE
ANION GAP SERPL CALC-SCNC: 13 MMOL/L (ref 5–15)
APPEARANCE UR: CLEAR
AST SERPL-CCNC: 17 U/L (ref 15–37)
BACTERIA URNS QL MICRO: NEGATIVE /HPF
BARBITURATES UR QL SCN: NEGATIVE
BASOPHILS # BLD: 0 K/UL (ref 0–0.1)
BASOPHILS NFR BLD: 0 % (ref 0–1)
BENZODIAZ UR QL: NEGATIVE
BILIRUB SERPL-MCNC: 0.1 MG/DL (ref 0.2–1)
BILIRUB UR QL: NEGATIVE
BUN SERPL-MCNC: 7 MG/DL (ref 6–20)
BUN/CREAT SERPL: 12 (ref 12–20)
CALCIUM SERPL-MCNC: 9.2 MG/DL (ref 8.5–10.1)
CANNABINOIDS UR QL SCN: NEGATIVE
CHLORIDE SERPL-SCNC: 104 MMOL/L (ref 97–108)
CO2 SERPL-SCNC: 22 MMOL/L (ref 21–32)
COCAINE UR QL SCN: NEGATIVE
COLOR UR: ABNORMAL
CREAT SERPL-MCNC: 0.6 MG/DL (ref 0.55–1.02)
DIFFERENTIAL METHOD BLD: ABNORMAL
DRUG SCRN COMMENT,DRGCM: NORMAL
EOSINOPHIL # BLD: 0.3 K/UL (ref 0–0.4)
EOSINOPHIL NFR BLD: 4 % (ref 0–7)
EPITH CASTS URNS QL MICRO: NORMAL /LPF
ERYTHROCYTE [DISTWIDTH] IN BLOOD BY AUTOMATED COUNT: 12.4 % (ref 11.5–14.5)
ETHANOL SERPL-MCNC: <10 MG/DL
GLOBULIN SER CALC-MCNC: 4 G/DL (ref 2–4)
GLUCOSE SERPL-MCNC: 101 MG/DL (ref 65–100)
GLUCOSE UR STRIP.AUTO-MCNC: NEGATIVE MG/DL
HCG UR QL: POSITIVE
HCT VFR BLD AUTO: 31.5 % (ref 35–47)
HGB BLD-MCNC: 10.5 G/DL (ref 11.5–16)
HGB UR QL STRIP: NEGATIVE
IMM GRANULOCYTES # BLD AUTO: 0 K/UL (ref 0–0.04)
IMM GRANULOCYTES NFR BLD AUTO: 0 % (ref 0–0.5)
KETONES UR QL STRIP.AUTO: NEGATIVE MG/DL
LEUKOCYTE ESTERASE UR QL STRIP.AUTO: ABNORMAL
LYMPHOCYTES # BLD: 1.2 K/UL (ref 0.8–3.5)
LYMPHOCYTES NFR BLD: 15 % (ref 12–49)
MCH RBC QN AUTO: 30.6 PG (ref 26–34)
MCHC RBC AUTO-ENTMCNC: 33.3 G/DL (ref 30–36.5)
MCV RBC AUTO: 91.8 FL (ref 80–99)
METHADONE UR QL: NEGATIVE
MONOCYTES # BLD: 0.7 K/UL (ref 0–1)
MONOCYTES NFR BLD: 8 % (ref 5–13)
NEUTS SEG # BLD: 5.8 K/UL (ref 1.8–8)
NEUTS SEG NFR BLD: 73 % (ref 32–75)
NITRITE UR QL STRIP.AUTO: NEGATIVE
NRBC # BLD: 0 K/UL (ref 0–0.01)
NRBC BLD-RTO: 0 PER 100 WBC
OPIATES UR QL: NEGATIVE
PCP UR QL: NEGATIVE
PH UR STRIP: 6.5 [PH] (ref 5–8)
PLATELET # BLD AUTO: 266 K/UL (ref 150–400)
PMV BLD AUTO: 10.1 FL (ref 8.9–12.9)
POTASSIUM SERPL-SCNC: 3.8 MMOL/L (ref 3.5–5.1)
PROT SERPL-MCNC: 7 G/DL (ref 6.4–8.2)
PROT UR STRIP-MCNC: NEGATIVE MG/DL
RBC # BLD AUTO: 3.43 M/UL (ref 3.8–5.2)
RBC #/AREA URNS HPF: NORMAL /HPF (ref 0–5)
SODIUM SERPL-SCNC: 139 MMOL/L (ref 136–145)
SP GR UR REFRACTOMETRY: 1.01 (ref 1–1.03)
UROBILINOGEN UR QL STRIP.AUTO: 0.2 EU/DL (ref 0.2–1)
WBC # BLD AUTO: 8.1 K/UL (ref 3.6–11)
WBC URNS QL MICRO: NORMAL /HPF (ref 0–4)

## 2019-09-30 PROCEDURE — 80053 COMPREHEN METABOLIC PANEL: CPT

## 2019-09-30 PROCEDURE — 65220000003 HC RM SEMIPRIVATE PSYCH

## 2019-09-30 PROCEDURE — 80307 DRUG TEST PRSMV CHEM ANLYZR: CPT

## 2019-09-30 PROCEDURE — 90791 PSYCH DIAGNOSTIC EVALUATION: CPT

## 2019-09-30 PROCEDURE — 36415 COLL VENOUS BLD VENIPUNCTURE: CPT

## 2019-09-30 PROCEDURE — 85025 COMPLETE CBC W/AUTO DIFF WBC: CPT

## 2019-09-30 PROCEDURE — 81001 URINALYSIS AUTO W/SCOPE: CPT

## 2019-09-30 PROCEDURE — 99285 EMERGENCY DEPT VISIT HI MDM: CPT

## 2019-09-30 PROCEDURE — 81025 URINE PREGNANCY TEST: CPT

## 2019-09-30 PROCEDURE — 74011250637 HC RX REV CODE- 250/637: Performed by: EMERGENCY MEDICINE

## 2019-09-30 RX ORDER — DIPHENHYDRAMINE HCL 50 MG
50 CAPSULE ORAL
Status: DISCONTINUED | OUTPATIENT
Start: 2019-09-30 | End: 2019-10-03 | Stop reason: HOSPADM

## 2019-09-30 RX ORDER — DIPHENHYDRAMINE HCL 50 MG
50 CAPSULE ORAL
Status: DISCONTINUED | OUTPATIENT
Start: 2019-09-30 | End: 2019-09-30

## 2019-09-30 RX ORDER — LANOLIN ALCOHOL/MO/W.PET/CERES
325 CREAM (GRAM) TOPICAL EVERY OTHER DAY
COMMUNITY
End: 2021-03-07

## 2019-09-30 RX ORDER — SERTRALINE HYDROCHLORIDE 50 MG/1
50 TABLET, FILM COATED ORAL DAILY
Status: DISCONTINUED | OUTPATIENT
Start: 2019-09-30 | End: 2019-09-30 | Stop reason: HOSPADM

## 2019-09-30 RX ORDER — HYDROXYZINE 25 MG/1
25 TABLET, FILM COATED ORAL
Status: DISCONTINUED | OUTPATIENT
Start: 2019-09-30 | End: 2019-09-30 | Stop reason: HOSPADM

## 2019-09-30 RX ORDER — SERTRALINE HYDROCHLORIDE 50 MG/1
50 TABLET, FILM COATED ORAL DAILY
Status: ON HOLD | COMMUNITY
End: 2019-10-03 | Stop reason: SDUPTHER

## 2019-09-30 RX ORDER — GUAIFENESIN 100 MG/5ML
81 LIQUID (ML) ORAL DAILY
COMMUNITY
End: 2021-03-07

## 2019-09-30 RX ORDER — SWAB
1 SWAB, NON-MEDICATED MISCELLANEOUS DAILY
Status: DISCONTINUED | OUTPATIENT
Start: 2019-10-01 | End: 2019-10-03 | Stop reason: HOSPADM

## 2019-09-30 RX ADMIN — SERTRALINE HYDROCHLORIDE 50 MG: 50 TABLET ORAL at 12:03

## 2019-09-30 NOTE — ED NOTES
TRANSFER - OUT REPORT:    Verbal report given to Sadie Hill RN (name) on Mack Castro  being transferred to [facility]) for routine progression of care       Report consisted of patients Situation, Background, Assessment and   Recommendations(SBAR). Information from the following report(s) SBAR, ED Summary, [facility] and Recent Results was reviewed with the receiving nurse. Lines:   Peripheral IV 09/30/19 Right Antecubital (Active)   Site Assessment Clean, dry, & intact 9/30/2019  8:53 AM   Phlebitis Assessment 0 9/30/2019  8:53 AM   Infiltration Assessment 0 9/30/2019  8:53 AM   Dressing Status Clean, dry, & intact 9/30/2019  8:53 AM        Opportunity for questions and clarification was provided.       Patient transported with:   JORGE ALBERTO via [facility]

## 2019-09-30 NOTE — ED NOTES
Pt presents ambulatory to ED complaining of SI, denies attempt or specific plan at this time. Pt denies HI. Pt reports seeing shadows and hearing \"weird noises\". Pt reports previous psychiatric admission and suicidal attempt \"a long time ago\". Pt reports being 4 months pregnant. Pt reports having a 9yo and 4yo who are both with her grandmother. Pt reports feeling hopeless. Pt reports taking Sertraline, last dose a few days ago. Pt is alert and oriented x 4, RR even and unlabored, skin is warm and dry. Assesment completed and pt updated on plan of care. Emergency Department Nursing Plan of Care       The Nursing Plan of Care is developed from the Nursing assessment and Emergency Department Attending provider initial evaluation. The plan of care may be reviewed in the ED Provider note.     The Plan of Care was developed with the following considerations:   Patient / Family readiness to learn indicated by:verbalized understanding  Persons(s) to be included in education: patient  Barriers to Learning/Limitations:No    [address]    9/30/2019   8:13 AM

## 2019-09-30 NOTE — PROGRESS NOTES
Primary Nurse Isacc Aldrich RN and Eriberto Ambrosio RN performed a dual skin assessment on this patient No impairment noted  Tom score is 23    Admit to [facility] unit for depressed mood, increase anxiety that is intolerable and wanting to discuss medication options. Oriented to unit and routine. Verbalized understanding demonstrates appropriate behaviors and ability to follow staff direction and unit routine.

## 2019-09-30 NOTE — ED NOTES
HAN at bedside to transport pt to [facility]  EMS given pt's 2 belonging bags
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
## 2019-09-30 NOTE — BH NOTES
TRANSFER - IN REPORT:    Verbal report received from [facility] RN(name) on Jose Ramon Felton  being received from [facility](unit) for routine progression of care      Report consisted of patients Situation, Background, Assessment and   Recommendations(SBAR). Information from the following report(s) SBAR was reviewed with the receiving nurse. Opportunity for questions and clarification was provided. Assessment completed upon patients arrival to unit and care assumed.

## 2019-09-30 NOTE — GROUP NOTE
[facility]  GROUP DOCUMENTATION INDIVIDUAL Group Therapy Note Date: September 30 Group Start Time: 7458 Group End Time: 0168 Group Topic: Recreational/Music Therapy [address] Ramy Push [facility]  GROUP DOCUMENTATION GROUP Group Therapy Note Attendees: 5/9 Attendance: Did not attend Bea Tesfaye

## 2019-09-30 NOTE — ED NOTES
Pt resting in bed. Pt asked by this RN to try to use the restroom and give a urine sample. Pt responded I don't have to go.

## 2019-09-30 NOTE — ED PROVIDER NOTES
EMERGENCY DEPARTMENT HISTORY AND PHYSICAL EXAM      Date: 2019  Patient Name: Kayla Villarreal    Please note that this dictation was completed with [employer], the computer voice recognition software. Quite often unanticipated grammatical, syntax, homophones, and other interpretive errors are inadvertently transcribed by the computer software. Please disregard these errors. Please excuse any errors that have escaped final proofreading. History of Presenting Illness     Chief Complaint   Patient presents with   [address]       History Provided By: Patient    HPI: Kayla Villarreal, 22 y.o. female,  at approximately 4 months gestation presenting to the emergency department complaining of suicidal thoughts. Patient has a history of depression, takes sertraline 50 mg. She has had prior psychiatric admissions in the past.  Reports feeling hopeless, reports feeling like she may harm herself, she has no specific plan, made no accident furtherance. She denies any drugs or alcohol. She currently lives alone with her 2 sons, has no other help at home other than occasional help from her mother. No other exacerbating or relieving factors. No other associated symptoms. Patient denies any medical complaints at this time    PCP: None    No current facility-administered medications on file prior to encounter. Current Outpatient Medications on File Prior to Encounter   Medication Sig Dispense Refill    sertraline (ZOLOFT) 50 mg tablet Take 50 mg by mouth daily.  prenatal multivit-ca-min-fe-fa (PRENATAL VITAMIN) tab Take 1 Tab by mouth daily. 30 Tab 0    ibuprofen (MOTRIN) 800 mg tablet Take 1 Tab by mouth every eight (8) hours as needed for Pain. 30 Tab 0       Past History     Past Medical History:  Past Medical History:   Diagnosis Date    Chlamydia        Past Surgical History:  History reviewed. No pertinent surgical history. Family History:  History reviewed.  No pertinent family history. Social History:  Social History     Tobacco Use    Smoking status: Current Every Day Smoker     Packs/day: 0.25    Smokeless tobacco: Never Used   Substance Use Topics    Alcohol use: Not Currently     Frequency: Never    Drug use: No       Allergies:  No Known Allergies      Review of Systems   Review of Systems   Constitutional: Negative for chills and fever. HENT: Negative for congestion and sore throat. Eyes: Negative for visual disturbance. Respiratory: Negative for cough and shortness of breath. Cardiovascular: Negative for chest pain and leg swelling. Gastrointestinal: Negative for abdominal pain, blood in stool, diarrhea and nausea. Endocrine: Negative for polyuria. Genitourinary: Negative for dysuria, flank pain, vaginal bleeding and vaginal discharge. Musculoskeletal: Negative for myalgias. Skin: Negative for rash. Allergic/Immunologic: Negative for immunocompromised state. Neurological: Negative for weakness and headaches. Psychiatric/Behavioral: Positive for dysphoric mood and suicidal ideas. Negative for confusion and hallucinations. The patient is nervous/anxious. Physical Exam   Physical Exam   Constitutional: She is oriented to person, place, and time. She appears well-developed and well-nourished. HENT:   Head: Normocephalic and atraumatic. Moist mucous membranes   Eyes: Pupils are equal, round, and reactive to light. Conjunctivae are normal. Right eye exhibits no discharge. Left eye exhibits no discharge. Neck: Normal range of motion. Neck supple. No tracheal deviation present. Cardiovascular: Normal rate and regular rhythm. Murmur (3/6 systolic murmur) heard. Pulmonary/Chest: Effort normal and breath sounds normal. No respiratory distress. She has no wheezes. She has no rales. Abdominal: Soft. Bowel sounds are normal. There is no tenderness. There is no rebound and no guarding. Musculoskeletal: Normal range of motion.  She exhibits no edema, tenderness or deformity. Neurological: She is alert and oriented to person, place, and time. Skin: Skin is warm and dry. No rash noted. No erythema. Psychiatric: Her speech is normal. Her mood appears anxious. She is withdrawn. She exhibits a depressed mood. She expresses suicidal ideation. She expresses no suicidal plans and no homicidal plans. crying   Nursing note and vitals reviewed. Diagnostic Study Results     Labs -     Recent Results (from the past 12 hour(s))   CBC WITH AUTOMATED DIFF    Collection Time: 09/30/19  8:51 AM   Result Value Ref Range    WBC 8.1 3.6 - 11.0 K/uL    RBC 3.43 (L) 3.80 - 5.20 M/uL    HGB 10.5 (L) 11.5 - 16.0 g/dL    HCT 31.5 (L) 35.0 - 47.0 %    MCV 91.8 80.0 - 99.0 FL    MCH 30.6 26.0 - 34.0 PG    MCHC 33.3 30.0 - 36.5 g/dL    RDW 12.4 11.5 - 14.5 %    PLATELET 683 091 - 769 K/uL    MPV 10.1 8.9 - 12.9 FL    NRBC 0.0 0  WBC    ABSOLUTE NRBC 0.00 0.00 - 0.01 K/uL    NEUTROPHILS 73 32 - 75 %    LYMPHOCYTES 15 12 - 49 %    MONOCYTES 8 5 - 13 %    EOSINOPHILS 4 0 - 7 %    BASOPHILS 0 0 - 1 %    IMMATURE GRANULOCYTES 0 0.0 - 0.5 %    ABS. NEUTROPHILS 5.8 1.8 - 8.0 K/UL    ABS. LYMPHOCYTES 1.2 0.8 - 3.5 K/UL    ABS. MONOCYTES 0.7 0.0 - 1.0 K/UL    ABS. EOSINOPHILS 0.3 0.0 - 0.4 K/UL    ABS. BASOPHILS 0.0 0.0 - 0.1 K/UL    ABS. IMM. GRANS. 0.0 0.00 - 0.04 K/UL    DF AUTOMATED     METABOLIC PANEL, COMPREHENSIVE    Collection Time: 09/30/19  8:51 AM   Result Value Ref Range    Sodium 139 136 - 145 mmol/L    Potassium 3.8 3.5 - 5.1 mmol/L    Chloride 104 97 - 108 mmol/L    CO2 22 21 - 32 mmol/L    Anion gap 13 5 - 15 mmol/L    Glucose 101 (H) 65 - 100 mg/dL    BUN 7 6 - 20 MG/DL    Creatinine 0.60 0.55 - 1.02 MG/DL    BUN/Creatinine ratio 12 12 - 20      GFR est AA >60 >60 ml/min/1.73m2    GFR est non-AA >60 >60 ml/min/1.73m2    Calcium 9.2 8.5 - 10.1 MG/DL    Bilirubin, total 0.1 (L) 0.2 - 1.0 MG/DL    ALT (SGPT) 14 12 - 78 U/L    AST (SGOT) 17 15 - 37 U/L    Alk. phosphatase 35 (L) 45 - 117 U/L    Protein, total 7.0 6.4 - 8.2 g/dL    Albumin 3.0 (L) 3.5 - 5.0 g/dL    Globulin 4.0 2.0 - 4.0 g/dL    A-G Ratio 0.8 (L) 1.1 - 2.2     ETHYL ALCOHOL    Collection Time: 09/30/19  8:55 AM   Result Value Ref Range    ALCOHOL(ETHYL),SERUM <10 <10 MG/DL   URINALYSIS W/ RFLX MICROSCOPIC    Collection Time: 09/30/19 10:00 AM   Result Value Ref Range    Color YELLOW/STRAW      Appearance CLEAR CLEAR      Specific gravity 1.010 1.003 - 1.030      pH (UA) 6.5 5.0 - 8.0      Protein NEGATIVE  NEG mg/dL    Glucose NEGATIVE  NEG mg/dL    Ketone NEGATIVE  NEG mg/dL    Bilirubin NEGATIVE  NEG      Blood NEGATIVE  NEG      Urobilinogen 0.2 0.2 - 1.0 EU/dL    Nitrites NEGATIVE  NEG      Leukocyte Esterase TRACE (A) NEG     DRUG SCREEN, URINE    Collection Time: 09/30/19 10:00 AM   Result Value Ref Range    AMPHETAMINES NEGATIVE  NEG      BARBITURATES NEGATIVE  NEG      BENZODIAZEPINES NEGATIVE  NEG      COCAINE NEGATIVE  NEG      METHADONE NEGATIVE  NEG      OPIATES NEGATIVE  NEG      PCP(PHENCYCLIDINE) NEGATIVE  NEG      THC (TH-CANNABINOL) NEGATIVE  NEG      Drug screen comment (NOTE)    URINE MICROSCOPIC ONLY    Collection Time: 09/30/19 10:00 AM   Result Value Ref Range    WBC 0-4 0 - 4 /hpf    RBC 0-5 0 - 5 /hpf    Epithelial cells FEW FEW /lpf    Bacteria NEGATIVE  NEG /hpf       Radiologic Studies -   No orders to display     CT Results  (Last 48 hours)    None        CXR Results  (Last 48 hours)    None            Medical Decision Making   I am the first provider for this patient. I reviewed the vital signs, available nursing notes, past medical history, past surgical history, family history and social history. Vital Signs-Reviewed the patient's vital signs.   Patient Vitals for the past 12 hrs:   Temp Pulse Resp BP SpO2   09/30/19 1150 98.2 °F (36.8 °C) 84 16 107/62 100 %   09/30/19 0811 97.9 °F (36.6 °C) 94 18 122/61 99 %       Records Reviewed: Nursing Notes and Old Medical Records    Provider Notes (Medical Decision Making):   No acute medical concerns with pregnancy, will check beta-hCG. Vital signs normal, no concern for preeclampsia or other pregnancy complication. Will check urine, basic labs. Consult be smart. ED Course:   Initial assessment performed. The patients presenting problems have been discussed, and they are in agreement with the care plan formulated and outlined with them. I have encouraged them to ask questions as they arise throughout their visit. ED Course as of Sep 30 1155   Mon Sep 30, 2019   1038 Bsmart planning for admission    [AR]      ED Course User Index  [AR] Leda Tomas DO         Critical Care Time:   none    Disposition:  Transfer to [facility] for inpatient psychiatric care. Dr. Thang Echeverria accepting physician      Diagnosis     Clinical Impression:   1. Suicidal ideation    2. Depression, unspecified depression type    3. Pregnancy, unspecified gestational age        [de-identified]:   This note was completed by Jessica Lombardo DO

## 2019-10-01 PROBLEM — Z34.90 PREGNANCY: Status: ACTIVE | Noted: 2019-10-01

## 2019-10-01 PROBLEM — R01.1 MURMUR: Status: ACTIVE | Noted: 2019-10-01

## 2019-10-01 PROBLEM — O99.012 ANEMIA DURING PREGNANCY IN SECOND TRIMESTER: Status: ACTIVE | Noted: 2019-10-01

## 2019-10-01 PROCEDURE — 74011250637 HC RX REV CODE- 250/637: Performed by: PSYCHIATRY & NEUROLOGY

## 2019-10-01 PROCEDURE — 74011250637 HC RX REV CODE- 250/637: Performed by: NURSE PRACTITIONER

## 2019-10-01 PROCEDURE — 65220000003 HC RM SEMIPRIVATE PSYCH

## 2019-10-01 RX ORDER — SERTRALINE HYDROCHLORIDE 50 MG/1
25 TABLET, FILM COATED ORAL DAILY
Status: DISCONTINUED | OUTPATIENT
Start: 2019-10-01 | End: 2019-10-01

## 2019-10-01 RX ORDER — SERTRALINE HYDROCHLORIDE 50 MG/1
50 TABLET, FILM COATED ORAL DAILY
Status: DISCONTINUED | OUTPATIENT
Start: 2019-10-01 | End: 2019-10-03 | Stop reason: HOSPADM

## 2019-10-01 RX ORDER — GUAIFENESIN 100 MG/5ML
81 LIQUID (ML) ORAL DAILY
Status: DISCONTINUED | OUTPATIENT
Start: 2019-10-01 | End: 2019-10-03 | Stop reason: HOSPADM

## 2019-10-01 RX ORDER — DOCUSATE SODIUM 100 MG/1
100 CAPSULE, LIQUID FILLED ORAL
Status: DISCONTINUED | OUTPATIENT
Start: 2019-10-01 | End: 2019-10-03 | Stop reason: HOSPADM

## 2019-10-01 RX ORDER — LANOLIN ALCOHOL/MO/W.PET/CERES
325 CREAM (GRAM) TOPICAL EVERY OTHER DAY
Status: DISCONTINUED | OUTPATIENT
Start: 2019-10-01 | End: 2019-10-03 | Stop reason: HOSPADM

## 2019-10-01 RX ADMIN — Medication 1 TABLET: at 08:55

## 2019-10-01 RX ADMIN — SERTRALINE HYDROCHLORIDE 50 MG: 50 TABLET ORAL at 11:51

## 2019-10-01 NOTE — INTERDISCIPLINARY ROUNDS
Behavioral Health Interdisciplinary Rounds Patient Name: Buddie Soulier  Age: 22 y.o. Room/Bed:  727/ Primary Diagnosis: <principal problem not specified> Admission Status: Voluntary Readmission within 30 days: no 
Power of  in place: no 
Patient requires a blocked bed: no          Reason for blocked bed: VTE Prophylaxis: No 
 
Mobility needs/Fall risk: no 
Flu Vaccine : no  
Nutritional Plan: no 
Consults:        
Labs/Testing due today?: yes Sleep hours:  7 Participation in Care/Groups:  no 
Medication Compliant?: New Admission PRNS (last 24 hours): None Restraints (last 24 hours):  no 
  
CIWA (range last 24 hours): COWS (range last 24 hours): Alcohol screening (AUDIT) completed -   AUDIT Score: 0 If applicable, date SBIRT discussed in treatment team AND documented:  
AUDIT Screen Score: AUDIT Score: 0 Tobacco - patient is a smoker: Have You Used Tobacco in the Past 30 Days: No 
Illegal Drugs use: Have You Used Any Illegal Substances Over the Past 12 Months: No 
 
24 hour chart check complete: yes Patient goal(s) for today: Meet treatment team.  
Treatment team focus/goals: Assess needs for treatment and safe discharge. Progress note: Pt is alert, oriented, calm, cooperative. Endorsed long history of depression with recent worsening of symptoms despite medications LOS:  1  Expected LOS: TBD Financial concerns[facility]pres[facility] Family contact: Vicki Williamson ([phone]) ANMOL signed by Pt Family requesting physician contact today:  no 
Discharge plan: return home Access to weapons :  no Outpatient provider(s): to be linked to [facility] Patient's preferred phone number for follow up call : [phone] Participating treatment team members: Ole Obregon NP; Perry Peralta RN; Marita Bach, TreyD;  Stefan Donnelly

## 2019-10-01 NOTE — CONSULTS
Hospitalist H&P Note         Tony Kuhn NP  [phone] or TigerText  Call physician on-call through the  7pm-7am    Primary Care Provider: None  Source of Information: Patient, chart    History of Presenting Illness:   Andreina Serrato is a 22 y.o. female with PMH significant for chlamydia who is admitted to [facility] psychiatry unit for Major depression w/ SI/HI. Patient is currently about 4 months pregnant with her 3rd child (two other children live with her at home-oldest is 7). She states she receives prenatal care at [facility]. She is taking prenatal vitamin w/ Iron d/t pregnancy induced anemia. She denies any other PMH, home medications, surgical history or family history. Pt is seen and examined privately and c/o slight HA only. Review of Systems:  General: negative for fever, chills, sweats, weakness, weight loss  Eyes: negative for changes or loss in vision, eye pain  Ear Nose and Throat: negative for rhinorrhea, otalgia, speech or swallowing difficulties  Respiratory:  negative for cough, sputum production, SOB, wheezing, LY  Cardiology:  negative for chest pain, palpitations, orthopnea, edema, syncope   Gastrointestinal: negative for abdominal pain, N/V, change in bowel habits, bleeding  Genitourinary: negative for frequency, urgency, dysuria, hematuria, incontinence  Musculoskeletal : negative for arthralgia, myalgia  Skin: negative for easy bruising, bleeding, negative for rash, ulceration, new lesion  Endocrine: negative for hot flashes or polydipsia  Neurological: +HA, neg for dizziness, confusion or memory loss, focal weakness, paresthesia, gait disturbance  Psychological: negative for anxiety, agitation, +depression    Past Medical History:   Diagnosis Date    Anemia during pregnancy in second trimester 10/1/2019    Chlamydia     Murmur 10/1/2019      History reviewed. No pertinent surgical history.   Prior to Admission medications    Medication Sig Start Date End Date Taking? Authorizing Provider   prenatal multivit-ca-min-fe-fa (PRENATAL VITAMIN) tab Take 1 Tab by mouth daily. 7/4/19  Yes Nava Carney MD   sertraline (ZOLOFT) 50 mg tablet Take 50 mg by mouth daily. OtherAudra MD   aspirin 81 mg chewable tablet Take 81 mg by mouth daily. Indications: decrease your chance of preeclampsia    Provider, Historical   ferrous sulfate 325 mg (65 mg iron) tablet Take 325 mg by mouth every other day. Indications: anemia from inadequate iron    Provider, Historical     No Known Allergies   History reviewed. No pertinent family history. Extended Emergency Contact Information  Primary Emergency Contact: Daniela Reynolds  Address: [address], [address] Phone: [phone]  Relation: Grandparent     SOCIAL HISTORY:  Patient resides:  Independently X   Assisted Living    SNF    With family care       Smoking history:   None    Former X   Chronic      Alcohol history:   None X   Social    Chronic      Substance Abuse history:   No X   Yes    Substance(s):      Ambulates:   Independently X   w/cane    w/walker    w/wc      CODE STATUS:  DNR    Full X   Other      Objective:   Physical Exam:   Visit Vitals  /60   Pulse 85   Temp 98.3 °F (36.8 °C)   Resp 16   LMP 06/02/2019 (Within Days)   SpO2 98%   Breastfeeding? No           General:  Alert, cooperative, no distress, appears stated age. HEENT:  Normocephalic, atraumatic. Conjunctivae/corneas clear. PERRL, EOMs intact. Nares nl. Septum midline. Mucosa nl. No drainage or sinus tenderness. Lips, mucosa, and tongue nl. Teeth and gums nl. Neck: Supple, symmetrical, trachea midline, no adenopathy, thyroid: no enlargement/tenderness/nodules    Back:   Symmetric, no curvature. ROM nl. No CVA tenderness. Lungs:   Clear to auscultation bilaterally. No Wheezing/Rhonchi/Rales.  No SOB, no accessory muscle use    Heart:  Regular rate and rhythm, S1, S2 nl, 1-1/0 systolic murmur, no click, rub or gallop. Abdomen:   Soft, non-tender. Bowel sounds nl. Extremities: Extremities nl, atraumatic, no clubbing, cyanosis or edema. Skin: Skin color, texture, turgor nl. No rashes or lesions. Cap refill <3 sec    Psych: Cooperative, not anxious or agitated. A/O x 3. Neurologic: CNII-XII grossly intact. no focal neurological deficits,  moving all extremities, speech clear      EKG: NA    Data Review:   Recent Days:  Recent Results (from the past 24 hour(s))   CBC WITH AUTOMATED DIFF    Collection Time: 09/30/19  8:51 AM   Result Value Ref Range    WBC 8.1 3.6 - 11.0 K/uL    RBC 3.43 (L) 3.80 - 5.20 M/uL    HGB 10.5 (L) 11.5 - 16.0 g/dL    HCT 31.5 (L) 35.0 - 47.0 %    MCV 91.8 80.0 - 99.0 FL    MCH 30.6 26.0 - 34.0 PG    MCHC 33.3 30.0 - 36.5 g/dL    RDW 12.4 11.5 - 14.5 %    PLATELET 151 711 - 878 K/uL    MPV 10.1 8.9 - 12.9 FL    NRBC 0.0 0  WBC    ABSOLUTE NRBC 0.00 0.00 - 0.01 K/uL    NEUTROPHILS 73 32 - 75 %    LYMPHOCYTES 15 12 - 49 %    MONOCYTES 8 5 - 13 %    EOSINOPHILS 4 0 - 7 %    BASOPHILS 0 0 - 1 %    IMMATURE GRANULOCYTES 0 0.0 - 0.5 %    ABS. NEUTROPHILS 5.8 1.8 - 8.0 K/UL    ABS. LYMPHOCYTES 1.2 0.8 - 3.5 K/UL    ABS. MONOCYTES 0.7 0.0 - 1.0 K/UL    ABS. EOSINOPHILS 0.3 0.0 - 0.4 K/UL    ABS. BASOPHILS 0.0 0.0 - 0.1 K/UL    ABS. IMM. GRANS. 0.0 0.00 - 0.04 K/UL    DF AUTOMATED     METABOLIC PANEL, COMPREHENSIVE    Collection Time: 09/30/19  8:51 AM   Result Value Ref Range    Sodium 139 136 - 145 mmol/L    Potassium 3.8 3.5 - 5.1 mmol/L    Chloride 104 97 - 108 mmol/L    CO2 22 21 - 32 mmol/L    Anion gap 13 5 - 15 mmol/L    Glucose 101 (H) 65 - 100 mg/dL    BUN 7 6 - 20 MG/DL    Creatinine 0.60 0.55 - 1.02 MG/DL    BUN/Creatinine ratio 12 12 - 20      GFR est AA >60 >60 ml/min/1.73m2    GFR est non-AA >60 >60 ml/min/1.73m2    Calcium 9.2 8.5 - 10.1 MG/DL    Bilirubin, total 0.1 (L) 0.2 - 1.0 MG/DL    ALT (SGPT) 14 12 - 78 U/L    AST (SGOT) 17 15 - 37 U/L    Alk. phosphatase 35 (L) 45 - 117 U/L    Protein, total 7.0 6.4 - 8.2 g/dL    Albumin 3.0 (L) 3.5 - 5.0 g/dL    Globulin 4.0 2.0 - 4.0 g/dL    A-G Ratio 0.8 (L) 1.1 - 2.2     ETHYL ALCOHOL    Collection Time: 09/30/19  8:55 AM   Result Value Ref Range    ALCOHOL(ETHYL),SERUM <10 <10 MG/DL   URINALYSIS W/ RFLX MICROSCOPIC    Collection Time: 09/30/19 10:00 AM   Result Value Ref Range    Color YELLOW/STRAW      Appearance CLEAR CLEAR      Specific gravity 1.010 1.003 - 1.030      pH (UA) 6.5 5.0 - 8.0      Protein NEGATIVE  NEG mg/dL    Glucose NEGATIVE  NEG mg/dL    Ketone NEGATIVE  NEG mg/dL    Bilirubin NEGATIVE  NEG      Blood NEGATIVE  NEG      Urobilinogen 0.2 0.2 - 1.0 EU/dL    Nitrites NEGATIVE  NEG      Leukocyte Esterase TRACE (A) NEG     DRUG SCREEN, URINE    Collection Time: 09/30/19 10:00 AM   Result Value Ref Range    AMPHETAMINES NEGATIVE  NEG      BARBITURATES NEGATIVE  NEG      BENZODIAZEPINES NEGATIVE  NEG      COCAINE NEGATIVE  NEG      METHADONE NEGATIVE  NEG      OPIATES NEGATIVE  NEG      PCP(PHENCYCLIDINE) NEGATIVE  NEG      THC (TH-CANNABINOL) NEGATIVE  NEG      Drug screen comment (NOTE)    URINE MICROSCOPIC ONLY    Collection Time: 09/30/19 10:00 AM   Result Value Ref Range    WBC 0-4 0 - 4 /hpf    RBC 0-5 0 - 5 /hpf    Epithelial cells FEW FEW /lpf    Bacteria NEGATIVE  NEG /hpf   HCG URINE, QL. - POC    Collection Time: 09/30/19 11:56 AM   Result Value Ref Range    Pregnancy test,urine (POC) POSITIVE (A) NEG          Imaging: NA      Assessment & Plan     Major depression  -Mgt per primary team    Pregnancy - approx 4 months  -Continue prenatal vitamin w/ iron  -Rec consult OB if any bleeding/cramping or concern    Anemia-likely pregnancy induced  -Previously normal, now 10.7  -Pt states she is taking prenatal vitamins w/ iron d/t anemia, continue  -Add PRN colace    Systolic Murmur - likely pregnancy induced  -Noted 3/6 on ED note, currently 1-2/6  -Asymptomatic       Discharge recommendations: Continue with prenatal care    The patient is medically stable for IP psych admission. Thank you for giving us opportunity to participate in this patients care. Will sign off at this time, please re-consult if there are any further medical management needs or questions.     Tong Soto NP  [facility]  Adult Hospitalists    I can be reached via [employer] or at [phone]       Signed By: Sirisha Campos NP     October 1, 2019
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
## 2019-10-01 NOTE — BH NOTES
GROUP THERAPY PROGRESS NOTE    Allan Corea partially participated in a Process Group on the General Unit with a focus identifying feelings, planning for the day, and learning more about DBT concepts on \"Emotion Regulation\" and using the [address] as a long-term personal treatment plan. Group time: 6351  1018     Personal goal for participation: To increase the capacity to improve ones mood, set personal goals, and understand more about basic activities to successfully state and get ones needs through personal treatment goal setting. Goal orientation: The patients will be able to identify their feelings and develop a goal for themselves for   their day. The didactic portion of the session covered developing a personal short-term and long-term   treatment plan for oneself. The members were introduced to three primary areas to help maintain   emotional regulation on a daily basis:   1) Build Mastery  set a personal goal for yourself every day; it is the easiest way to regain a sense of direction for ones life. 2) Reduce vulnerability and resiliency by taking care of ones health physical needs (sleep, nutritious food, keeping doctor appointments, and   3) Recognize and build on positive experiences, sometimes beginning with a gratitude list.   The second page contained the following elements of a DBT house drawing with multiple levels:    1) foundation - values that govern your life;   2) first floor with a door  behaviors would like to manage and feel more control over or areas you want to change; the door represents an opportunity to list or draw things that you keep hidden from others;   3) second floor  list or draw emotions you want to experience more often, more fully, or in a more healthy fashion;   4) third floor  a list of things that make you happy or want to feel happy about;   5) attic  list or draw what  a Life Juris Dickson would look like.    There is also a roof, where people and things that protect you can be listed. The chimney provides an   opportunity to list ways in which you blow off steam. The billboard allows one to post those things in   one's life they are proud of and the walls of the house provide an opportunity to list those people and   things that provide support. The group members were asked to consider filling in the [address] on their   own after group. Therapeutic interventions reviewed and discussed: The group  members were asked to identify an emotion they are having and/or  let the group know what they want to focus on for the day as they  continue to make discharge plans. The group were informed of the  elements of the [address] for them to complete in their free time. Impression of participation: This patient joined the group about senior care through the session and responded to gentle prompting. She actively participated in the group after joining it. She said said she was feeling \"good\" and that she wanted to [place] out. \" She added that she realized that \"taking my medicine\" and having someone at [place] talk with\" would be good for her as part of her aftercare planning. The patient was alert, generally oriented, and, perhaps, minimizing her challenges. She expressed no current SI/HI and displayed no overt psychotic symptoms in this group. She may need encouragement to talk about her struggles as a single-mother. Her affect was depressed, with anxiety. Her mood reflected her affect, along with a certain guardedness and reluctance to talk about her difficulties. This was the patient's first process group with the undersigned.

## 2019-10-01 NOTE — PROGRESS NOTES
2310: Patient resting quietly in bed with eyes closed. No distress noted. Respirations are even and unlabored. Staff will continue to monitor q15 throughout the shift. Problem: Falls - Risk of  Goal: *Absence of Falls  Description  Document Keo Brown Fall Risk and appropriate interventions in the flowsheet.   Outcome: Progressing Towards Goal  Note:   Fall Risk Interventions:            Medication Interventions: Teach patient to arise slowly

## 2019-10-01 NOTE — PROGRESS NOTES
Problem: Discharge Planning  Goal: *Discharge to safe environment  Outcome: Progressing Towards Goal  Note:   Patient identifies home as a safe environment and plans to return home upon discharge. Goal: *Knowledge of medication management  Outcome: Progressing Towards Goal  Note:   Patient is taking medications as prescribed. Goal: *Knowledge of discharge instructions  Outcome: Progressing Towards Goal  Note:   Patient verbalizes understanding of goals for treatment and safe discharge.

## 2019-10-01 NOTE — PROGRESS NOTES
Problem: Depressed Mood (Adult/Pediatric)  Goal: *STG: Participates in treatment plan  Outcome: Progressing Towards Goal  Pt participates in therapeutic groups. She verbalizs her needs appropriately.

## 2019-10-01 NOTE — PROGRESS NOTES
Admission Medication Reconciliation:    Information obtained from:  Insurance claims data (will discuss in treatment team on 10/1)  RxQuery data available¹:  YES    Comments/Recommendations: Updated PTA meds/reviewed patient's allergies. 1)  Med rec completed via rxquery review and was confirmed today with patient during treatment team. She reports taking aspirin to prevent preeclampsia (occurred with prior pregnancy). She states that she was taking her sertraline on a PRN basis when she fells really bad or overwhelmed. She was educated on the correct way to take her antidepressant (scheduled) - she verbalized her understanding. 2)  Medication changes (since last review): Adjusted  - ferrous sulfate 325mg every other day (from ferrous fumarate)    3)  The [place] Prescription Monitoring Program () was assessed to determine fill history of any controlled medications. The patient has NOT filled any controlled medications in the last 2 years. ¹RxQuery pharmacy benefit data reflects medications filled and processed through the patient's insurance, however   this data does NOT capture whether the medication was picked up or is currently being taken by the patient. Allergies:  Patient has no known allergies. Significant PMH/Disease States:   Past Medical History:   Diagnosis Date    Chlamydia      Chief Complaint for this Admission:  No chief complaint on file. Prior to Admission Medications:   Hannah r to Admission Medications   Prescriptions Last Dose Informant Patient Reported? Taking?   aspirin 81 mg chewable tablet Unknown at Unknown time  Yes No   Sig: Take 81 mg by mouth daily. Indications: decrease your chance of preeclampsia   ferrous sulfate 325 mg (65 mg iron) tablet Unknown at Unknown time  Yes No   Sig: Take 325 mg by mouth every other day.  Indications: anemia from inadequate iron   prenatal multivit-ca-min-fe-fa (PRENATAL VITAMIN) tab 9/29/2019 at Unknown time  No Yes   Sig: Take 1 Tab by mouth daily. sertraline (ZOLOFT) 50 mg tablet Not Taking at Unknown time  Yes No   Sig: Take 50 mg by mouth daily.       Facility-Administered Medications: None     ZENIA Adam

## 2019-10-01 NOTE — PROGRESS NOTES
Patient attended spirituality group and actively participated. : Rev. An Turner.  Graham; to contact [id number] [facility] call: [phone]

## 2019-10-01 NOTE — PROGRESS NOTES
[facility] 60  Master Treatment Plan for [employer]    Date Treatment Plan Initiated: 10/1/19    Treatment Plan Modalities:  Type of Modality Amount  (x minutes) Frequency (x/week) Duration (x days) Name of Responsible Staff   [address] [facility] meetings to encourage peer interactions 15 7 1 Haritha LOUIS     Group psychotherapy to assist in building coping skills and internal controls 60 7 1 Suman Olivo   Therapeutic activity groups to build coping skills 60 7 1 Suman Olivo   Psychoeducation in group setting to address:   Medication education   15 7 [address] 7 skills         Relaxation techniques         Symptom management         Discharge planning   [address] 2 1 150 [facility] 66   60 1 1 volunteer   Recovery/AA/NA      volunteer   Physician medication management   15 7 [address] NP   Family meeting/discharge planning   15 2 [facility]                                 These goals will be met by 10/3/19    Problem: Depressed Mood (Adult/Pediatric)  Goal: *STG: Participates in treatment plan  Outcome: Progressing Towards Goal  Note:   Out on unit social w peers and staff. Mood and affect brighter when engaged. Denies SI. States she was \"stressed out\" yesterday and needed to step away. Describes feeling improved and ready to get back to her home and life. Encourages staff to talk with her grandmother to plan her d/c. Daily gaol is to rest and attend groups.  Staff focus is on coordination of d/c follow up and medication education  Goal: *STG: Verbalizes anger, guilt, and other feelings in a constructive manor  Outcome: Progressing Towards Goal  Goal: *STG: Attends activities and groups  Outcome: Progressing Towards Goal  Goal: *STG: Demonstrates reduction in symptoms and increase in insight into coping skills/future focused  Outcome: Progressing Towards Goal  Goal: Interventions  Outcome: Progressing Towards Goal

## 2019-10-02 LAB
ALBUMIN SERPL-MCNC: 2.9 G/DL (ref 3.5–5)
ALBUMIN/GLOB SERPL: 0.7 {RATIO} (ref 1.1–2.2)
ALP SERPL-CCNC: 35 U/L (ref 45–117)
ALT SERPL-CCNC: 11 U/L (ref 12–78)
ANION GAP SERPL CALC-SCNC: 8 MMOL/L (ref 5–15)
AST SERPL-CCNC: 8 U/L (ref 15–37)
BILIRUB SERPL-MCNC: 0.2 MG/DL (ref 0.2–1)
BUN SERPL-MCNC: 6 MG/DL (ref 6–20)
BUN/CREAT SERPL: 12 (ref 12–20)
CALCIUM SERPL-MCNC: 9.2 MG/DL (ref 8.5–10.1)
CHLORIDE SERPL-SCNC: 106 MMOL/L (ref 97–108)
CHOLEST SERPL-MCNC: 181 MG/DL
CO2 SERPL-SCNC: 24 MMOL/L (ref 21–32)
CREAT SERPL-MCNC: 0.51 MG/DL (ref 0.55–1.02)
ERYTHROCYTE [DISTWIDTH] IN BLOOD BY AUTOMATED COUNT: 12.2 % (ref 11.5–14.5)
GLOBULIN SER CALC-MCNC: 4.1 G/DL (ref 2–4)
GLUCOSE P FAST SERPL-MCNC: 77 MG/DL (ref 65–100)
GLUCOSE SERPL-MCNC: 77 MG/DL (ref 65–100)
HCT VFR BLD AUTO: 30.9 % (ref 35–47)
HDLC SERPL-MCNC: 85 MG/DL
HDLC SERPL: 2.1 {RATIO} (ref 0–5)
HGB BLD-MCNC: 10.3 G/DL (ref 11.5–16)
LDLC SERPL CALC-MCNC: 76 MG/DL (ref 0–100)
LIPID PROFILE,FLP: NORMAL
MCH RBC QN AUTO: 30.4 PG (ref 26–34)
MCHC RBC AUTO-ENTMCNC: 33.3 G/DL (ref 30–36.5)
MCV RBC AUTO: 91.2 FL (ref 80–99)
NRBC # BLD: 0 K/UL (ref 0–0.01)
NRBC BLD-RTO: 0 PER 100 WBC
PLATELET # BLD AUTO: 251 K/UL (ref 150–400)
PMV BLD AUTO: 10.5 FL (ref 8.9–12.9)
POTASSIUM SERPL-SCNC: 3.7 MMOL/L (ref 3.5–5.1)
PROT SERPL-MCNC: 7 G/DL (ref 6.4–8.2)
RBC # BLD AUTO: 3.39 M/UL (ref 3.8–5.2)
SODIUM SERPL-SCNC: 138 MMOL/L (ref 136–145)
TRIGL SERPL-MCNC: 100 MG/DL (ref ?–150)
TSH SERPL DL<=0.05 MIU/L-ACNC: 0.56 UIU/ML (ref 0.36–3.74)
VLDLC SERPL CALC-MCNC: 20 MG/DL
WBC # BLD AUTO: 6.6 K/UL (ref 3.6–11)

## 2019-10-02 PROCEDURE — 74011250637 HC RX REV CODE- 250/637: Performed by: NURSE PRACTITIONER

## 2019-10-02 PROCEDURE — 36415 COLL VENOUS BLD VENIPUNCTURE: CPT

## 2019-10-02 PROCEDURE — 85027 COMPLETE CBC AUTOMATED: CPT

## 2019-10-02 PROCEDURE — 84443 ASSAY THYROID STIM HORMONE: CPT

## 2019-10-02 PROCEDURE — 80061 LIPID PANEL: CPT

## 2019-10-02 PROCEDURE — 82947 ASSAY GLUCOSE BLOOD QUANT: CPT

## 2019-10-02 PROCEDURE — 80053 COMPREHEN METABOLIC PANEL: CPT

## 2019-10-02 PROCEDURE — 74011250637 HC RX REV CODE- 250/637: Performed by: PSYCHIATRY & NEUROLOGY

## 2019-10-02 PROCEDURE — 65220000003 HC RM SEMIPRIVATE PSYCH

## 2019-10-02 RX ADMIN — Medication 1 TABLET: at 08:53

## 2019-10-02 RX ADMIN — DIPHENHYDRAMINE HYDROCHLORIDE 50 MG: 50 CAPSULE ORAL at 23:04

## 2019-10-02 RX ADMIN — SERTRALINE HYDROCHLORIDE 50 MG: 50 TABLET ORAL at 08:53

## 2019-10-02 RX ADMIN — DOCUSATE SODIUM 100 MG: 100 CAPSULE, LIQUID FILLED ORAL at 17:36

## 2019-10-02 NOTE — PROGRESS NOTES
Problem: Depressed Mood (Adult/Pediatric)  Goal: *STG: Participates in treatment plan  Outcome: Progressing Towards Goal  Affect is bright. She participates in all therapeutic activities and is supportive to her peers.

## 2019-10-02 NOTE — PROGRESS NOTES
Problem: Depressed Mood (Adult/Pediatric)  Goal: *STG: Participates in treatment plan  Outcome: Progressing Towards Goal  Note:   Patient participates in treatment plan. Medications and sleep discussed. Patient has organized thoughts, fair insight. Patient denies si, attends groups, out on the unit, cooperative, med and meal compliant. Goal: *STG: Attends activities and groups  Outcome: Progressing Towards Goal  Note:   Patient attends activities and groups.

## 2019-10-02 NOTE — INTERDISCIPLINARY ROUNDS
Behavioral Health Interdisciplinary Rounds Patient Name: Lexx Melton  Age: 22 y.o. Room/Bed:  727/ Primary Diagnosis: <principal problem not specified> Admission Status: Voluntary Readmission within 30 days: no 
Power of  in place: no 
Patient requires a blocked bed: no          Reason for blocked bed: VTE Prophylaxis: No 
 
Mobility needs/Fall risk: no 
Flu Vaccine : no  
Nutritional Plan: no 
Consults:         
Labs/Testing due today?: yes-complete Sleep hours:  6.25 Participation in Care/Groups:  yes Medication Compliant?: Yes PRNS (last 24 hours): None Restraints (last 24 hours): CIWA (range last 24 hours): COWS (range last 24 hours): Alcohol screening (AUDIT) completed -   AUDIT Score: 0 If applicable, date SBIRT discussed in treatment team AND documented:  
AUDIT Screen Score: AUDIT Score: 0 Tobacco - patient is a smoker: Have You Used Tobacco in the Past 30 Days: No 
Illegal Drugs use: Have You Used Any Illegal Substances Over the Past 12 Months: No 
 
24 hour chart check complete:   
 
Patient goal(s) for today: Participate in treatment, take medications as prescribed, attend groups. Treatment team focus/goals: Titrate medications, coordinate postdischarge follow up, contact family. Progress note:  Pt is alert, oriented, calm, cooperative. Reported adverse effects of Zoloft (shakey, loss of appetite). Pt admitted she stopped her medicatoins because she felt better and realizes she cannot do that. Denied SI.  
 
LOS:  2  Expected LOS: TBD Financial concerns/prescription coverage:  [facility] Family contact: Navid Jackson ([phone]) ANMOL signed; SHAYE spoke with grandmother 10/2/19 Family requesting physician contact today:  no 
Discharge plan: return home Access to weapons :  no Outpatient provider(s): to be linked to [facility] Patient's preferred phone number for follow up call : [phone] Participating treatment team members: Dr. Asa Dodson MD; Brenda Rust RN; Mary Mcdowell, TreyD;  MARGARET Catsillo

## 2019-10-02 NOTE — BH NOTES
GROUP THERAPY PROGRESS NOTE    Andreina Serrato is participating in [place].      Group time: 15 minutes    Personal goal for participation:  Talk with doctor about discharge    Goal orientation: community    Group therapy participation: minimal    Therapeutic interventions reviewed and discussed:   yes  Impression of participation: limited

## 2019-10-02 NOTE — PROGRESS NOTES
Problem: Depressed Mood (Adult/Pediatric)  Goal: *STG: Participates in treatment plan  Outcome: Progressing Towards Goal  Affect is brighter. Focus has been on discharge planning. She participates in therapeutic activities.

## 2019-10-02 NOTE — PROGRESS NOTES
2315: Patient resting quietly in bed with eyes closed. No distress noted. Respirations are even and unlabored. Staff will continue to monitor q15 throughout the shift. Problem: Falls - Risk of  Goal: *Absence of Falls  Description  Document Donata Carrel Fall Risk and appropriate interventions in the flowsheet.   Outcome: Progressing Towards Goal  Note:   Fall Risk Interventions:            Medication Interventions: Teach patient to arise slowly

## 2019-10-02 NOTE — GROUP NOTE
ERWIN  GROUP DOCUMENTATION INDIVIDUAL Group Therapy Note Date: October 1 Group Start Time: 2045 Group End Time: 2115 Group Topic: Reflection/Relaxation [address] MaximoradhaLinda  GROUP DOCUMENTATION GROUP Group Therapy Note Attendees: 8/9 Attendance: Attended Patient's Goal:  \"To go home\" Interventions/techniques: Informed and Validated Follows Directions: Followed directions Interactions: Interacted appropriately Mental Status: Flat Behavior/appearance: Cooperative Goals Achieved: Able to engage in interactions and Able to listen to others Additional Notes:   
 
Josias Morris

## 2019-10-02 NOTE — BH NOTES
GROUP THERAPY PROGRESS NOTE    Mayra Potter participated in a morning Process Group on the General Unit with a focus identifying feelings, planning for the day, and learning about the [employer] and Healing the Inner Child. Group time: 4150  5302     Personal goal for participation: To increase the capacity to improve ones mood, set personal goals, and understand more about basic activities to help regulate emotions to avoid unnecessary drama in ones life. Goal orientation: The patients will be able to identify their feelings and develop a plan for structuring   their day. They were also presented with a two-sided sheet. The first page focused on the [facility]   ([address]) [address] as a paradigm for breaking the dysfunctional patterns associated with the roles of   Victim, Persecutor, and Rescuer. The second page focused on the concept of healing the wounded   as a paradigm for trauma recovery. Six steps were suggested to help heal a wounded inner child:   1) trust;   2) validation;   3) shock and anger;   4) sadness;   5) remorse; and   6) loneliness. Therapeutic interventions reviewed and discussed: The group members were asked to identify an   emotion they are having and/or let the group know what they want to focus on for the day as they   continue to make discharge plans. The group members also reviewed their feelings and goals for the   day. They also reviewed the handout described above. Impression of participation: With prompting, this patient actively participated in the group, except when called out to meet with her treatment team. She was alert, generally oriented, and said she was feeling, \"Good. \" She added that she missed her children and that she anticipated aftercare with [facility] and continuing with her anti-depressant. She expressed no current SI/HI and displayed no overt psychotic symptoms in this group.  Her affect was still depressed, but without the obvious intensity reported on admission. Her mood reflected her affect. She did not add much to the group conversation but she looked like she was attentive to the group discussion.

## 2019-10-02 NOTE — H&P
[address] [facility]    Name:  Helene Davis  MR#:  [id number]  :  1995  ACCOUNT #:  [de-identified]  ADMIT DATE:  2019    PROGRESS NOTE:    DATE OF SERVICE:  10/02/2019    CHIEF COMPLAINT:  Depression. HISTORY OF PRESENTING ILLNESS:  The patient is a 25-year-old 4-month pregnant female who is admitted at [address] [place] [address] on a voluntary basis. She states that she has suffered from depression for a long time, off and on over the years. She was previously admitted in a psychiatric facility for suicidal thoughts. She has been feeling hopeless and helpless. She has been crying and consolably been severely depressed. She stated she has nobody to talk to. She was having suicidal statements on admission. According to her therapist, the patient seems to be minimizing the severity of her depression. Her urine drug screen is negative. She is supposed to be taking Zoloft 50 mg, but does not know who the prescriber is, but she has only been partially compliant with her medication. She stated she does not want to be here anymore. She states that when she is off her medications she gets really depressed to the point that she does not want to be around anybody, she gets very irritated. She is admitted to the Inpatient Psychiatric Unit for further evaluation and treatment. INTERVAL HISTORY:   10/2/19  Ms. Talbot is somewhat better today. She woke up several times last night but slept better last night. Her mood is \"good\" today and she is less depressed. Pleasant and calm during the interview. Notes feeling shaky and has nausea with Zoloft but wants to continue taking it. Denies any SI or plan today. Encouraged to take Benadryl but declines to take it. Her grandmother visited last evening and she enjoyed it. PAST MEDICAL HISTORY:  See H and P. PAST PSYCHIATRIC HOSPITALIZATION:  This is her second psychiatric inpatient admission.   She is being partially compliant with her Zoloft. She cannot recall the name of the prescribing provider. PSYCHOSOCIAL HISTORY:  She is single. She has three children. She is 4 months' pregnant. She finished high school. She is currently unemployed. She is currently in a WARSTUFF program with [employer]. She lives with her kid. MENTAL STATUS EXAMINATION:  She is alert and oriented in all spheres. She is dressed in street clothes. Mood is dysphoric. Affect is blunted. Speech, normal rate and rhythm. Thought process, logical and goal-directed. She is endorsing passive suicidal ideation, but denies homicidal ideation. No perceptual abnormalities. No paranoia or delusion. Memory seems intact. Intelligence seems average. Insight is poor. Judgment is poor. DIAGNOSIS:  Major depressive disorder, recurrent, severe, without psychotic features. TREATMENT PLANNING:   Continue current medication regimen. I will continue her inpatient stay. She will be provided with support and encouraged to attend groups. Her safety will be monitored. Her medications will be modified and assessed. Case Management will work on discharge planning. ASSETS AND STRENGTHS:  She is willing to seek help. She is willing to take medications. ESTIMATED LENGTH OF STAY:  Five to seven days.

## 2019-10-03 VITALS
RESPIRATION RATE: 16 BRPM | WEIGHT: 139.4 LBS | SYSTOLIC BLOOD PRESSURE: 114 MMHG | BODY MASS INDEX: 27.22 KG/M2 | HEART RATE: 77 BPM | TEMPERATURE: 97.9 F | OXYGEN SATURATION: 99 % | DIASTOLIC BLOOD PRESSURE: 76 MMHG

## 2019-10-03 PROCEDURE — 74011250637 HC RX REV CODE- 250/637: Performed by: NURSE PRACTITIONER

## 2019-10-03 PROCEDURE — 74011250637 HC RX REV CODE- 250/637: Performed by: PSYCHIATRY & NEUROLOGY

## 2019-10-03 RX ORDER — SERTRALINE HYDROCHLORIDE 50 MG/1
50 TABLET, FILM COATED ORAL DAILY
Qty: 30 TAB | Refills: 0 | Status: SHIPPED | OUTPATIENT
Start: 2019-10-03

## 2019-10-03 RX ADMIN — Medication 1 TABLET: at 08:39

## 2019-10-03 RX ADMIN — SERTRALINE HYDROCHLORIDE 50 MG: 50 TABLET ORAL at 08:39

## 2019-10-03 NOTE — DISCHARGE INSTRUCTIONS
DISCHARGE SUMMARY    NAME:Collins Talbot  : 1995  MRN: [id number]    The patient Jose Ramon Felton exhibits the ability to control behavior in a less restrictive environment. Patient's level of functioning is improving. No assaultive/destructive behavior has been observed for the past 24 hours. No suicidal/homicidal threat or behavior has been observed for the past 24 hours. There is no evidence of serious medication side effects. Patient has not been in physical or protective restraints for at least the past 24 hours. If weapons involved, how are they secured? NA    Is patient aware of and in agreement with discharge plan? Yes    Arrangements for medication:  Prescriptions given to patient. Copy of discharge instructions to provider?:  Idelle Cranker (fax: [phone])    Arrangements for transportation home:  Patient to be discharged to mother's home via [facility]. Keep all follow up appointments as scheduled, continue to take prescribed medications per physician instructions. Mental health crisis number:  391 or your local mental health crisis line number at [phone]. DISCHARGE SUMMARY from Nurse    PATIENT INSTRUCTIONS:      What to do at Home:  Recommended activity: Activity as tolerated,     If you experience any of the following symptoms thoughts of harming self, feeling overwhelmed with hopelessness and intense anxiety or frustration, please [facility] and your local crisis number at [phone]    *  Please give a list of your current medications to your Primary Care Provider. *  Please update this list whenever your medications are discontinued, doses are      changed, or new medications (including over-the-counter products) are added. *  Please carry medication information at all times in case of emergency situations.     These are general instructions for a healthy lifestyle:    No smoking/ No tobacco products/ Avoid exposure to second hand smoke  Surgeon Hamilton Jimenez Warning:  Quitting smoking now greatly reduces serious risk to your health. Obesity, smoking, and sedentary lifestyle greatly increases your risk for illness    A healthy diet, regular physical exercise & weight monitoring are important for maintaining a healthy lifestyle    You may be retaining fluid if you have a history of heart failure or if you experience any of the following symptoms:  Weight gain of 3 pounds or more overnight or 5 pounds in a week, increased swelling in our hands or feet or shortness of breath while lying flat in bed. Please call your doctor as soon as you notice any of these symptoms; do not wait until your next office visit. The discharge information has been reviewed with the patient. The patient verbalized understanding. Discharge medications reviewed with the patient and appropriate educational materials and side effects teaching were provided.   ___________________________________________________________________________________________________________________________________

## 2019-10-03 NOTE — PROGRESS NOTES
Problem: Depressed Mood (Adult/Pediatric)  Goal: *STG: Participates in treatment plan  Outcome: Progressing Towards Goal  Note:   Out on unit engaged and bright. Denies SI. Describes hope feeling rested and safe for discharge. Daily goal is to talk with mother, arrange transportation and discharge.  Staff focus is on d/c planning  Goal: *STG: Verbalizes anger, guilt, and other feelings in a constructive manor  Outcome: Progressing Towards Goal  Goal: *STG: Attends activities and groups  Outcome: Progressing Towards Goal  Goal: *STG: Demonstrates reduction in symptoms and increase in insight into coping skills/future focused  Outcome: Progressing Towards Goal  Goal: Interventions  Outcome: Progressing Towards Goal

## 2019-10-03 NOTE — PROGRESS NOTES
Problem: Falls - Risk of  Goal: *Absence of Falls  Description  Document Bib Sanjay Fall Risk and appropriate interventions in the flowsheet.   Outcome: Progressing Towards Goal  Note:   Fall Risk Interventions:     Medication Interventions: Teach patient to arise slowly     Lying quietly  in bed with eyes closed, respirations  even and unlabored   Q15 min safety monitoring continues

## 2019-10-03 NOTE — BH NOTES
GROUP THERAPY PROGRESS NOTE    Epifanioessence Adria is participating in [place].      Group time: 15 minutes    Personal goal for participation: prepare for discharge    Goal orientation: community    Group therapy participation: active    Therapeutic interventions reviewed and discussed: yes    Impression of participation: engaged

## 2019-10-03 NOTE — BH NOTES
GROUP THERAPY PROGRESS NOTE    Buddie Soulier did not participate in a Morning Process Group on the General Unit with a focus on identifying feelings, planning for the day, and learning more about the grief process. This patient was finalizing her discharge plans with nursing.

## 2019-10-03 NOTE — DISCHARGE SUMMARY
PSYCHIATRIC DISCHARGE SUMMARY      Patient: Ramirez Nagy MRN: [id number]  SSN: [id number]    YOB: 1995  Age: 22 y.o. Sex: female        Date of Admission: 9/30/2019  Date of Discharge:10/3/2019       Type of Discharge:  REGULAR    Admission data:  CHIEF COMPLAINT:  Depression.     HISTORY OF PRESENTING ILLNESS:  The patient is a 76-year-old 4-month pregnant female who is admitted at [address] [place] [address] on a voluntary basis. She states that she has suffered from depression off and on over the years. She was previously admitted in a psychiatric facility for suicidal thoughts. She has been feeling hopeless and helpless. She has been crying inconsolably been severely depressed. She feels alone and has nobody to talk to. She was having suicidal statements on admission. According to her therapist, the patient seems to be minimizing the severity of her depression. She is supposed to be taking Zoloft 50 mg, but does not know who the prescriber is, she has only been partially compliant with her medication. She stated she does not want to be here anymore. She states that when she is off her medications she gets really depressed to the point that she does not want to be around anybody, she gets very irritated. Her urine drug screen is negative. She is admitted to the Inpatient Psychiatric Unit for further evaluation and treatment.     PAST MEDICAL HISTORY:  See H and P.     PAST PSYCHIATRIC HOSPITALIZATION:  This is her second psychiatric inpatient admission. She is being partially compliant with her Zoloft. She cannot recall the name of the prescribing provider.     PSYCHOSOCIAL HISTORY:  She is single. She has three children. She is 4 months' pregnant. She finished high school. She is currently unemployed. She is currently in a [employer] program with [employer].   She lives with her kid.     MENTAL STATUS EXAMINATION:  She is alert and oriented in all spheres. She is dressed in street clothes. Mood is dysphoric. Affect is blunted. Speech, normal rate and rhythm. Thought process, logical and goal-directed. She is endorsing passive suicidal ideation, but denies homicidal ideation. No perceptual abnormalities. No paranoia or delusion. Memory seems intact. Intelligence seems average. Insight is poor. Judgment is poor. Hospital Course:    Patient was admitted to the Psychiatric services for acute psychiatric stabilization in regards to symptomatology as described in the HPI above and placed on Q15 minute checks and suicide precautions. She was started back on her usual medication regimen as well as PRN medications including zoloft, asa, prenatal vitamin. While on the unit Juli Villalpando was involved in individual, group, occupational and milieu therapy. She improved gradually and was able to integrate into the milieu with help from the nursing staff. Patients symptoms improved gradually including depression has improved, no si or hi, mood stable, able to commit to safety. She was appropriate in her interactions, and cooperative with medications and the unit routine. Please see individual progress notes for more specific details regarding patient's hospitalization course. Patient was discharged as per the plan. She had been doing well on the unit as per the report of the nursing staff and my observations. No PRN medication for agitation, seclusion or restraints were required during the last 48 hours of her stay. Juli Villalpando had improved progressively to the point of being stable for discharge and outpatient FU. At this time she did not offer any complaints. Patient denied any SI or HI. Denied any AH or VH. She denied any delusions. Was not considered a danger to self or to others and is safe for discharge. Will FU with her appointments and remains motivated to be in treatment. The patient verbalized understanding of her discharge instructions. Some parts of the discharge summary are from the initial Psychiatric interview that was done on admission by the admitting psychiatrist.       Allergies:(reviewed/updated 10/3/2019)  No Known Allergies    Side Effects: (reviewed/updated 10/3/2019)  None reported or admitted to. Vital Signs:  Patient Vitals for the past 24 hrs:   Temp Pulse Resp BP SpO2   10/03/19 0828 97.9 °F (36.6 °C) 77 16 114/76 99 %   10/02/19 1510 98.2 °F (36.8 °C) 84 16 109/73 97 %   10/02/19 0916 98.2 °F (36.8 °C) 77 16 105/68 97 %     Wt Readings from Last 3 Encounters:   10/01/19 63.2 kg (139 lb 6.4 oz)   09/30/19 65.4 kg (144 lb 4 oz)   07/04/19 60.3 kg (133 lb)     Temp Readings from Last 3 Encounters:   10/03/19 97.9 °F (36.6 °C)   09/30/19 98.1 °F (36.7 °C)   07/04/19 97.9 °F (36.6 °C)     BP Readings from Last 3 Encounters:   10/03/19 114/76   09/30/19 117/58   07/04/19 122/75     Pulse Readings from Last 3 Encounters:   10/03/19 77   09/30/19 82   07/04/19 82       Labs: (reviewed/updated 10/3/2019)  No results found for this or any previous visit (from the past 24 hour(s)). No results found for: VALF2, VALAC, VALP, VALPR, DS6, CRBAM, CRBAMP, CARB2, XCRBAM  No results found for: [facility]    Radiology (reviewed/updated 10/3/2019)  No results found. Mental Status Exam on Discharge:  General appearance:   Kamilah Guaman is a 22 y.o. BLACK OR  female who is well groomed, psychomotor activity is WNL  Eye contact: makes good eye contact  Speech: Spontaneous and coherent  Affect : Euthymic  Mood: \"OK\"  Thought Process: Logical, goal directed  Perception: Denies any AH or VH. Thought Content: Denies any SI or Plan  Insight: Partial  Judgement: Fair  Cognition: Intact grossly. Discharge Diagnosis:   Major depressive disorder, recurrent, severe, without psychotic features.         Current Discharge Medication List      CONTINUE these medications which have CHANGED    Details   sertraline (ZOLOFT) 50 mg tablet Take 1 Tab by mouth daily. Indications: Anxiousness associated with Depression  Qty: 30 Tab, Refills: 0         CONTINUE these medications which have NOT CHANGED    Details   aspirin 81 mg chewable tablet Take 81 mg by mouth daily. Indications: decrease your chance of preeclampsia      prenatal multivit-ca-min-fe-fa (PRENATAL VITAMIN) tab Take 1 Tab by mouth daily. Qty: 30 Tab, Refills: 0      ferrous sulfate 325 mg (65 mg iron) tablet Take 325 mg by mouth every other day. Indications: anemia from inadequate iron                  Follow-up Information     Follow up With Specialties Details Why Contact Info    [facility]  Go on 10/4/2019 You have an 8:00AM walk-in intake appointment to be assessed and referred for mental health services (psychiatry and therapy). Please bring photo ID and insurance card; pleaes allow 2-2.5 hours for the intake appointment. [address] [place] [address], [address] [place] [address]  phone: [phone]  fax: [phone]    None    None (395) Patient stated that they have no PCP          WOUND CARE: none needed. Prognosis:   Good / [address] based on nature of patient's pathology/ies and treatment compliance issues. Prognosis is greatly dependent upon patient's ability to  follow up on psychiatric/psychotherapy appointments as well as to comply with psychiatric medications as prescribed. I certify that this patients inpatient psychiatric hospital services furnished since the previous certification were, and continue to be, required for treatment that could reasonably be expected to improve the patient's condition, or for diagnostic study, and that the patient continues to need, on a daily basis, active treatment furnished directly by or requiring the supervision of inpatient psychiatric facility personnel. In addition, the hospital records show that services furnished were intensive treatment services, admission or related services, or equivalent services.      Signed:  Mik Stone NP  10/3/2019

## 2019-10-03 NOTE — BH NOTES
PRN Medication Documentation     Specific patient behavior that led to need for PRN medication: pt request   Staff interventions attempted prior to PRN being given: quiet milieu   PRN medication given: Benadryl  50 mg po   Patient response/effectiveness of PRN medication: good results noted

## 2019-10-03 NOTE — BH NOTES
Behavioral Health Transition Record to Provider    Patient Name: Jermaine Bashir  YOB: 1995  Medical Record Number: [id number]  Date of Admission: 9/30/2019  Date of Discharge: 10/3/2019    Attending Provider: No att. providers found  Discharging Provider: Dr. Stephen Ching MD  To contact this individual call [phone] and ask the  to page. If unavailable, ask to be transferred to [facility] Provider on call. [facility] Provider will be available on call 24/7 and during holidays. Primary Care Provider: None    No Known Allergies    Reason for Admission: Pt was voluntarily admitted for worsening depression after stopping her medications.     Admission Diagnosis: Major depression [F32.9]    * No surgery found *    Results for orders placed or performed during the hospital encounter of 09/30/19   CBC W/O DIFF   Result Value Ref Range    WBC 6.6 3.6 - 11.0 K/uL    RBC 3.39 (L) 3.80 - 5.20 M/uL    HGB 10.3 (L) 11.5 - 16.0 g/dL    HCT 30.9 (L) 35.0 - 47.0 %    MCV 91.2 80.0 - 99.0 FL    MCH 30.4 26.0 - 34.0 PG    MCHC 33.3 30.0 - 36.5 g/dL    RDW 12.2 11.5 - 14.5 %    PLATELET 173 758 - 193 K/uL    MPV 10.5 8.9 - 12.9 FL    NRBC 0.0 0  WBC    ABSOLUTE NRBC 0.00 0.00 - 0.01 K/uL   GLUCOSE, FASTING   Result Value Ref Range    Glucose 77 65 - 100 MG/DL   LIPID PANEL   Result Value Ref Range    LIPID PROFILE          Cholesterol, total 181 <200 MG/DL    Triglyceride 100 <150 MG/DL    HDL Cholesterol 85 MG/DL    LDL, calculated 76 0 - 100 MG/DL    VLDL, calculated 20 MG/DL    CHOL/HDL Ratio 2.1 0.0 - 5.0     METABOLIC PANEL, COMPREHENSIVE   Result Value Ref Range    Sodium 138 136 - 145 mmol/L    Potassium 3.7 3.5 - 5.1 mmol/L    Chloride 106 97 - 108 mmol/L    CO2 24 21 - 32 mmol/L    Anion gap 8 5 - 15 mmol/L    Glucose 77 65 - 100 mg/dL    BUN 6 6 - 20 MG/DL    Creatinine 0.51 (L) 0.55 - 1.02 MG/DL    BUN/Creatinine ratio 12 12 - 20      GFR est AA >60 >60 ml/min/1.73m2    GFR est non-AA >60 >60 ml/min/1.73m2    Calcium 9.2 8.5 - 10.1 MG/DL    Bilirubin, total 0.2 0.2 - 1.0 MG/DL    ALT (SGPT) 11 (L) 12 - 78 U/L    AST (SGOT) 8 (L) 15 - 37 U/L    Alk. phosphatase 35 (L) 45 - 117 U/L    Protein, total 7.0 6.4 - 8.2 g/dL    Albumin 2.9 (L) 3.5 - 5.0 g/dL    Globulin 4.1 (H) 2.0 - 4.0 g/dL    A-G Ratio 0.7 (L) 1.1 - 2.2     TSH 3RD GENERATION   Result Value Ref Range    TSH 0.56 0.36 - 3.74 uIU/mL       Immunizations administered during this encounter: There is no immunization history on file for this patient. Screening for Metabolic Disorders for Patients on Antipsychotic Medications  (Data obtained from the EMR)    Estimated Body Mass Index  Estimated body mass index is 27.22 kg/m² as calculated from the following:    Height as of an earlier encounter on 9/30/19: 5' (1.524 m). Weight as of this encounter: 63.2 kg (139 lb 6.4 oz). Vital Signs/Blood Pressure  Visit Vitals  /76   Pulse 77   Temp 97.9 °F (36.6 °C)   Resp 16   Wt 63.2 kg (139 lb 6.4 oz)   LMP 06/02/2019 (Within Days)   SpO2 99%   Breastfeeding? No   BMI 27.22 kg/m²       Blood Glucose/Hemoglobin A1c  Lab Results   Component Value Date/Time    Glucose 77 10/02/2019 05:33 AM    Glucose 77 10/02/2019 05:33 AM       No results found for: HBA1C, HGBE8, GJN5WIHA     Lipid Panel  Lab Results   Component Value Date/Time    Cholesterol, total 181 10/02/2019 05:33 AM    HDL Cholesterol 85 10/02/2019 05:33 AM    LDL, calculated 76 10/02/2019 05:33 AM    Triglyceride 100 10/02/2019 05:33 AM    CHOL/HDL Ratio 2.1 10/02/2019 05:33 AM        Discharge Diagnosis: Major depressive disorder, recurrent, severe, without psychotic features. Discharge Plan: Discharged home via [facility] with follow up at [facility]. The patient Jermaine Bashir exhibits the ability to control behavior in a less restrictive environment. Patient's level of functioning is improving. No assaultive/destructive behavior has been observed for the past 24 hours. No suicidal/homicidal threat or behavior has been observed for the past 24 hours. There is no evidence of serious medication side effects. Patient has not been in physical or protective restraints for at least the past 24 hours. If weapons involved, how are they secured? NA    Is patient aware of and in agreement with discharge plan? Yes    Arrangements for medication:  Prescriptions given to patient. Copy of discharge instructions to provider?:  Gus Brice (fax: [phone])    Arrangements for transportation home:  Patient to be discharged to mother's home via [facility]. Keep all follow up appointments as scheduled, continue to take prescribed medications per physician instructions. Mental health crisis number:  304 or your local mental health crisis line number at [phone]. Discharge Medication List and Instructions:   Discharge Medication List as of 10/3/2019  9:52 AM      CONTINUE these medications which have CHANGED    Details   sertraline (ZOLOFT) 50 mg tablet Take 1 Tab by mouth daily. Indications: Anxiousness associated with Depression, Print, Disp-30 Tab, R-0         CONTINUE these medications which have NOT CHANGED    Details   aspirin 81 mg chewable tablet Take 81 mg by mouth daily. Indications: decrease your chance of preeclampsia, Historical Med      prenatal multivit-ca-min-fe-fa (PRENATAL VITAMIN) tab Take 1 Tab by mouth daily. , Print, Disp-30 Tab, R-0      ferrous sulfate 325 mg (65 mg iron) tablet Take 325 mg by mouth every other day. Indications: anemia from inadequate iron, Historical Med             Unresulted Labs (24h ago, onward)    None        To obtain results of studies pending at discharge, please contact [phone]    Follow-up Information     Follow up With Specialties Details Why Contact Info    [facility]  Go on 10/4/2019 You have an 8:00AM walk-in intake appointment to be assessed and referred for mental health services (psychiatry and therapy).  Please bring photo ID and insurance card; pleaes allow 2-2.5 hours for the intake appointment. [facility], [address] [place] [address]  phone: [phone]  fax: [phone]    None    None (395) Patient stated that they have no PCP            Advanced Directive:   Does the patient have an appointed surrogate decision maker? No  Does the patient have a Medical Advance Directive? No  Does the patient have a Psychiatric Advance Directive? No  If the patient does not have a surrogate or Medical Advance Directive AND Psychiatric Advance Directive, the patient was offered information on these advance directives Patient declined to complete    Patient Instructions: Please continue all medications until otherwise directed by physician. Tobacco Cessation Discharge Plan:   Is the patient a smoker and needs referral for smoking cessation? No  Patient referred to the following for smoking cessation with an appointment? Not applicable     Patient was offered medication to assist with smoking cessation at discharge? Not applicable  Was education for smoking cessation added to the discharge instructions? Yes    Alcohol/Substance Abuse Discharge Plan:   Does the patient have a history of substance/alcohol abuse and requires a referral for treatment? No  Patient referred to the following for substance/alcohol abuse treatment with an appointment? Not applicable  Patient was offered medication to assist with alcohol cessation at discharge? Not applicable  Was education for substance/alcohol abuse added to discharge instructions? No    Patient discharged to Home; discussed with patient/caregiver and provided to the patient/caregiver either in hard copy or electronically.

## 2019-10-03 NOTE — PROGRESS NOTES
Pharmacist Discharge Medication Reconciliation    Discharging Provider: Noemi Saravia NP    Significant PMH:   Past Medical History:   Diagnosis Date    Anemia during pregnancy in second trimester 10/1/2019    Chlamydia     Murmur 10/1/2019     Chief Complaint for this Admission: No chief complaint on file. Allergies: Patient has no known allergies. Discharge Medications:   Current Discharge Medication List        CONTINUE these medications which have CHANGED    Details   sertraline (ZOLOFT) 50 mg tablet Take 1 Tab by mouth daily. Indications: Anxiousness associated with Depression  Qty: 30 Tab, Refills: 0           CONTINUE these medications which have NOT CHANGED    Details   aspirin 81 mg chewable tablet Take 81 mg by mouth daily. Indications: decrease your chance of preeclampsia      prenatal multivit-ca-min-fe-fa (PRENATAL VITAMIN) tab Take 1 Tab by mouth daily. Qty: 30 Tab, Refills: 0      ferrous sulfate 325 mg (65 mg iron) tablet Take 325 mg by mouth every other day.  Indications: anemia from inadequate iron           The patient's chart, MAR and AVS were reviewed by Bess Gatica, KARAND.

## 2019-10-03 NOTE — INTERDISCIPLINARY ROUNDS
Behavioral Health Interdisciplinary Rounds Patient Name: Mack Castro  Age: 22 y.o. Room/Bed:  727/02 Primary Diagnosis: <principal problem not specified> Admission Status: Voluntary Readmission within 30 days: no 
Power of  in place: no 
Patient requires a blocked bed: no          Reason for blocked bed: VTE Prophylaxis: No 
 
Mobility needs/Fall risk: no 
Flu Vaccine : no  
Nutritional Plan: no 
Consults:       
Labs/Testing due today?: no 
 
Sleep hours:  6+ Participation in Care/Groups:  yes Medication Compliant?: Selective , refused scheduled ASA PRNS (last 24 hours): Sleep Aid Restraints (last 24 hours):  no 
  
CIWA (range last 24 hours): COWS (range last 24 hours): Alcohol screening (AUDIT) completed -   AUDIT Score: 0 If applicable, date SBIRT discussed in treatment team AND documented:  
AUDIT Screen Score: AUDIT Score: 0 Tobacco - patient is a smoker: Have You Used Tobacco in the Past 30 Days: No 
Illegal Drugs use: Have You Used Any Illegal Substances Over the Past 12 Months: No 
 
24 hour chart check complete: yes Patient goal(s) for today: Prepare for discharge. Treatment team focus/goals: Reconcile medications; and facilitate discharge. Progress note: Pt is alert, oriented, calm, cooperative, and psychiatrically stable for discharge. Patient understands mediations and follow up plan to [facility] to [facility] 10/4/19 at 8am.  
 
LOS:  3  Expected LOS: 3 Financial concerns/prescription coverage:  [employer] [facility] Family contact: GrandmotherAram ([phone]) ANMOL signed; SW spoke with grandmother 10/2/19 Family requesting physician contact today:  no 
Discharge plan: return home Access to weapons :  VB                                                          
Outpatient provider(s): to be linked to [facility] Patient's preferred phone number for follow up call : [phone] Participating treatment team members: Reyna Corona, MSW; Neha San RN; Radha Haile, PharmD; Juan Carlos Jaquez NP

## 2019-11-11 ENCOUNTER — HOSPITAL ENCOUNTER (EMERGENCY)
Age: 24
Discharge: HOME | End: 2019-11-11
Attending: EMERGENCY MEDICINE
Payer: COMMERCIAL

## 2019-11-11 VITALS
WEIGHT: 146.5 LBS | SYSTOLIC BLOOD PRESSURE: 117 MMHG | TEMPERATURE: 98 F | HEIGHT: 60 IN | HEART RATE: 90 BPM | BODY MASS INDEX: 28.76 KG/M2 | DIASTOLIC BLOOD PRESSURE: 61 MMHG | OXYGEN SATURATION: 99 % | RESPIRATION RATE: 17 BRPM

## 2019-11-11 DIAGNOSIS — B34.9 VIRAL SYNDROME: Primary | ICD-10-CM

## 2019-11-11 PROCEDURE — 99282 EMERGENCY DEPT VISIT SF MDM: CPT

## 2019-11-11 NOTE — ED NOTES
Pt here for evaluation of vomiting she had five days ago lasting three days. Pt is A+Ox3 clear speaking. Provider at bedside evaluating pt. Emergency Department Nursing Plan of Care       The Nursing Plan of Care is developed from the Nursing assessment and Emergency Department Attending provider initial evaluation. The plan of care may be reviewed in the ED Provider note.     The Plan of Care was developed with the following considerations:   Patient / Family readiness to learn indicated by:verbalized understanding  Persons(s) to be included in education: patient  Barriers to Learning/Limitations:No    Signed     Ritu Mcclure RN    11/11/2019   5:14 PM

## 2019-11-11 NOTE — ED PROVIDER NOTES
EMERGENCY DEPARTMENT HISTORY AND PHYSICAL EXAM      Date: 11/11/2019  Patient Name: Kim Sorenson    History of Presenting Illness     Chief Complaint   Patient presents with    Vomiting     pt reported she has vomiting and diarrhea x 1 week. History Provided By: Patient    HPI: Wallace Larson ~6 month pregnant 22 y.o. female with tobacco abuse who presents ambulatory to the ED with cc of acute mild intermittent n/v/d that started approximately 5 days ago and resolved within the last 2 days. Pt's son is here for same symptoms. Pt tolerating PO well today w/ no n/v/d. Denies abd pain, vaginal bleeding, rush of fluids, fever, chills, hematemesis, back pain, urinary sxs, melena, hematochezia, constipation, headache, cough, uri sxs, sob, cp. No meds or modifying factors. PCP: None    There are no other complaints, changes, or physical findings at this time. No current facility-administered medications on file prior to encounter. Current Outpatient Medications on File Prior to Encounter   Medication Sig Dispense Refill    sertraline (ZOLOFT) 50 mg tablet Take 1 Tab by mouth daily. Indications: Anxiousness associated with Depression 30 Tab 0    aspirin 81 mg chewable tablet Take 81 mg by mouth daily. Indications: decrease your chance of preeclampsia      ferrous sulfate 325 mg (65 mg iron) tablet Take 325 mg by mouth every other day. Indications: anemia from inadequate iron      prenatal multivit-ca-min-fe-fa (PRENATAL VITAMIN) tab Take 1 Tab by mouth daily. 27 Tab 0     Past History     Past Medical History:  Past Medical History:   Diagnosis Date    Anemia during pregnancy in second trimester 10/1/2019    Chlamydia     Murmur 10/1/2019     Past Surgical History:  History reviewed. No pertinent surgical history. Family History:  History reviewed. No pertinent family history.   Social History:  Social History     Tobacco Use    Smoking status: Former Smoker     Packs/day: 0.25     Last attempt to quit: 2019     Years since quittin.3    Smokeless tobacco: Never Used   Substance Use Topics    Alcohol use: Not Currently     Frequency: Never    Drug use: No     Allergies:  No Known Allergies  Review of Systems   Review of Systems   Constitutional: Negative for activity change, appetite change, chills, diaphoresis, fatigue and fever. HENT: Negative for congestion, ear pain, facial swelling, rhinorrhea, sore throat and trouble swallowing. Eyes: Negative for photophobia, pain and visual disturbance. Respiratory: Negative for cough. Cardiovascular: Negative for chest pain. Gastrointestinal: Positive for diarrhea, nausea and vomiting. Negative for abdominal distention, abdominal pain, anal bleeding, blood in stool, constipation and rectal pain. Resolved 2 days pta. Genitourinary: Negative for difficulty urinating, dysuria, menstrual problem and urgency. Musculoskeletal: Negative for arthralgias, back pain and myalgias. Skin: Negative. Negative for pallor. Neurological: Negative for dizziness, syncope, light-headedness and headaches. Psychiatric/Behavioral: Negative. Physical Exam   Physical Exam   Constitutional: She is oriented to person, place, and time. She appears well-developed and well-nourished. No distress. HENT:   Head: Normocephalic and atraumatic. Right Ear: Hearing and external ear normal.   Left Ear: Hearing and external ear normal.   Nose: Nose normal.   Mouth/Throat: Uvula is midline, oropharynx is clear and moist and mucous membranes are normal. Mucous membranes are not dry. No trismus in the jaw. No oropharyngeal exudate, posterior oropharyngeal edema, posterior oropharyngeal erythema or tonsillar abscesses. Eyes: Pupils are equal, round, and reactive to light. Conjunctivae and EOM are normal.   Neck: Normal range of motion. Cardiovascular: Normal rate, regular rhythm, normal heart sounds and intact distal pulses.    Pulmonary/Chest: Effort normal and breath sounds normal. No respiratory distress. She has no wheezes. Abdominal: Soft. Bowel sounds are normal. There is no tenderness. There is no rigidity, no rebound, no guarding and no CVA tenderness. Gravid. Musculoskeletal: Normal range of motion. Neurological: She is alert and oriented to person, place, and time. Skin: Skin is warm and dry. She is not diaphoretic. Psychiatric: She has a normal mood and affect. Her behavior is normal. Judgment and thought content normal.   Nursing note and vitals reviewed. Diagnostic Study Results   Labs -   No results found for this or any previous visit (from the past 12 hour(s)). Radiologic Studies -   No orders to display     No results found. Medical Decision Making   I am the first provider for this patient. I reviewed the vital signs, available nursing notes, past medical history, past surgical history, family history and social history. Vital Signs-Reviewed the patient's vital signs. Patient Vitals for the past 12 hrs:   Temp Pulse Resp BP SpO2   19 1650 98 °F (36.7 °C) 90 17 117/61 99 %     Pulse Oximetry Analysis - 99% on RA    Records Reviewed: Nursing Notes, Old Medical Records, Previous Radiology Studies and Previous Laboratory Studies    Provider Notes (Medical Decision Making):   A0 ~6 month pregnant 23 yo patient presents with resolved n/v/d. Suspect viral illness based on hx. Differential includes gastritis/GERD, pancreatitis cholelithiasis, cholecystitis, hepatitis, renal pathology, ACS, gastroenteritis, infection such as UTI/PNA. Pt denies symptoms at present. Tolerating PO well. Vitals stable. Plan to discharge and have follow up with OBGYN/PCP. ED Course:   Initial assessment performed. The patients presenting problems have been discussed, and they are in agreement with the care plan formulated and outlined with them. I have encouraged them to ask questions as they arise throughout their visit. Progress Note:   Updated pt on all returned results and findings. Discussed the importance of proper follow up as referred below along with return precautions. Pt in agreement with the care plan and expresses agreement with and understanding of all items discussed. Disposition:  5:16 PM  I have discussed with patient their diagnosis, treatment, and follow up plan. The patient agrees to follow up as outlined in discharge paperwork and also to return to the ED with any worsening. Salvatore Linton PA-C      PLAN:  1. Current Discharge Medication List        2. Follow-up Information     Follow up With Specialties Details Why [address] [facility]  Schedule an appointment as soon as possible for a visit in 1 week As needed, If symptoms worsen [address] [facility] [address]  Schedule an appointment as soon as possible for a visit in 3 days As needed [address] [place] [address]  [phone]        Return to ED if worse     Diagnosis     Clinical Impression:   1. Viral syndrome            Please note that this dictation was completed with Dragon, computer voice recognition software. Quite often unanticipated grammatical, syntax, homophones, and other interpretive errors are inadvertently transcribed by the computer software. Please disregard these errors. Additionally, please excuse any errors that have escaped final proofreading.

## 2019-11-11 NOTE — DISCHARGE INSTRUCTIONS
Patient Education        Viral Infections: Care Instructions  Your Care Instructions    You don't feel well, but it's not clear what's causing it. You may have a viral infection. Viruses cause many illnesses, such as the common cold, influenza, fever, rashes, and the diarrhea, nausea, and vomiting that are often called \"stomach flu. \" You may wonder if antibiotic medicines could make you feel better. But antibiotics only treat infections caused by bacteria. They don't work on viruses. The good news is that viral infections usually aren't serious. Most will go away in a few days without medical treatment. In the meantime, there are a few things you can do to make yourself more comfortable. Follow-up care is a key part of your treatment and safety. Be sure to make and go to all appointments, and call your doctor if you are having problems. It's also a good idea to know your test results and keep a list of the medicines you take. How can you care for yourself at home? · Get plenty of rest if you feel tired. · Take an over-the-counter pain medicine if needed, such as acetaminophen (Tylenol), ibuprofen (Advil, Motrin), or naproxen (Aleve). Read and follow all instructions on the label. · Be careful when taking over-the-counter cold or flu medicines and Tylenol at the same time. Many of these medicines have acetaminophen, which is Tylenol. Read the labels to make sure that you are not taking more than the recommended dose. Too much acetaminophen (Tylenol) can be harmful. · Drink plenty of fluids, enough so that your urine is light yellow or clear like water. If you have kidney, heart, or liver disease and have to limit fluids, talk with your doctor before you increase the amount of fluids you drink. · Stay home from work, school, and other public places while you have a fever. When should you call for help? Call 911 anytime you think you may need emergency care.  For example, call if:    · You have severe trouble breathing.     · You passed out (lost consciousness).    Call your doctor now or seek immediate medical care if:    · You seem to be getting much sicker.     · You have a new or higher fever.     · You have blood in your stools.     · You have new belly pain, or your pain gets worse.     · You have a new rash.    Watch closely for changes in your health, and be sure to contact your doctor if:    · You start to get better and then get worse.     · You do not get better as expected. Where can you learn more? Go to http://byron-isai.info/. Enter W834 in the search box to learn more about \"Viral Infections: Care Instructions. \"  Current as of: June 9, 2019  Content Version: 12.2  © 2939-3223 [employer]. Care instructions adapted under license by [employer] (which disclaims liability or warranty for this information). If you have questions about a medical condition or this instruction, always ask your healthcare professional. [address] any warranty or liability for your use of this information.

## 2019-11-11 NOTE — ED NOTES
Pt accepted DC data and accepted F/U plan. Patient (s)  given copy of dc instructions and 0 script(s). Patient (s)  verbalized understanding of instructions and script (s). Patient given a current medication reconciliation form and verbalized understanding of their medications. Patient (s) verbalized understanding of the importance of discussing medications with  his or her physician or clinic they will be following up with. Patient alert and oriented and in no acute distress. Patient discharged home ambulatory with self.

## 2020-03-19 ENCOUNTER — HOSPITAL ENCOUNTER (EMERGENCY)
Age: 25
Discharge: HOME | End: 2020-03-19
Attending: EMERGENCY MEDICINE
Payer: COMMERCIAL

## 2020-03-19 VITALS
DIASTOLIC BLOOD PRESSURE: 96 MMHG | SYSTOLIC BLOOD PRESSURE: 144 MMHG | HEIGHT: 61 IN | HEART RATE: 75 BPM | OXYGEN SATURATION: 96 % | RESPIRATION RATE: 16 BRPM | WEIGHT: 130 LBS | BODY MASS INDEX: 24.55 KG/M2

## 2020-03-19 DIAGNOSIS — K59.00 CONSTIPATION, UNSPECIFIED CONSTIPATION TYPE: ICD-10-CM

## 2020-03-19 DIAGNOSIS — K64.9 HEMORRHOIDS, UNSPECIFIED HEMORRHOID TYPE: Primary | ICD-10-CM

## 2020-03-19 PROCEDURE — 74011250637 HC RX REV CODE- 250/637: Performed by: EMERGENCY MEDICINE

## 2020-03-19 PROCEDURE — 99283 EMERGENCY DEPT VISIT LOW MDM: CPT

## 2020-03-19 PROCEDURE — 74011000250 HC RX REV CODE- 250: Performed by: EMERGENCY MEDICINE

## 2020-03-19 RX ORDER — OXYCODONE AND ACETAMINOPHEN 5; 325 MG/1; MG/1
1 TABLET ORAL
Qty: 12 TAB | Refills: 0 | Status: SHIPPED | OUTPATIENT
Start: 2020-03-19 | End: 2020-03-24

## 2020-03-19 RX ORDER — OXYCODONE AND ACETAMINOPHEN 5; 325 MG/1; MG/1
2 TABLET ORAL
Status: COMPLETED | OUTPATIENT
Start: 2020-03-19 | End: 2020-03-19

## 2020-03-19 RX ORDER — LIDOCAINE HYDROCHLORIDE 20 MG/ML
10 SOLUTION OROPHARYNGEAL
Status: COMPLETED | OUTPATIENT
Start: 2020-03-19 | End: 2020-03-19

## 2020-03-19 RX ORDER — DOCUSATE SODIUM 100 MG/1
100 CAPSULE, LIQUID FILLED ORAL 2 TIMES DAILY
Qty: 60 CAP | Refills: 2 | Status: SHIPPED | OUTPATIENT
Start: 2020-03-19 | End: 2020-06-17

## 2020-03-19 RX ORDER — LIDOCAINE HYDROCHLORIDE 20 MG/ML
JELLY TOPICAL
Qty: 2 ML | Refills: 0 | Status: SHIPPED | OUTPATIENT
Start: 2020-03-19 | End: 2021-03-07

## 2020-03-19 RX ORDER — LIDOCAINE HYDROCHLORIDE 20 MG/ML
10 SOLUTION OROPHARYNGEAL
Status: DISCONTINUED | OUTPATIENT
Start: 2020-03-19 | End: 2020-03-19

## 2020-03-19 RX ORDER — HYDROCORTISONE ACETATE 25 MG/1
25 SUPPOSITORY RECTAL EVERY 12 HOURS
Qty: 20 SUPPOSITORY | Refills: 0 | Status: SHIPPED | OUTPATIENT
Start: 2020-03-19 | End: 2021-03-07

## 2020-03-19 RX ADMIN — LIDOCAINE HYDROCHLORIDE 10 ML: 20 SOLUTION ORAL; TOPICAL at 22:18

## 2020-03-19 RX ADMIN — OXYCODONE HYDROCHLORIDE AND ACETAMINOPHEN 2 TABLET: 5; 325 TABLET ORAL at 22:18

## 2020-03-20 NOTE — DISCHARGE INSTRUCTIONS
Patient Education        Patient Education        Constipation: Care Instructions  Your Care Instructions    Constipation means that you have a hard time passing stools (bowel movements). People pass stools from 3 times a day to once every 3 days. What is normal for you may be different. Constipation may occur with pain in the rectum and cramping. The pain may get worse when you try to pass stools. Sometimes there are small amounts of bright red blood on toilet paper or the surface of stools. This is because of enlarged veins near the rectum (hemorrhoids). A few changes in your diet and lifestyle may help you avoid ongoing constipation. Your doctor may also prescribe medicine to help loosen your stool. Some medicines can cause constipation. These include pain medicines and antidepressants. Tell your doctor about all the medicines you take. Your doctor may want to make a medicine change to ease your symptoms. Follow-up care is a key part of your treatment and safety. Be sure to make and go to all appointments, and call your doctor if you are having problems. It's also a good idea to know your test results and keep a list of the medicines you take. How can you care for yourself at home? · Drink plenty of fluids, enough so that your urine is light yellow or clear like water. If you have kidney, heart, or liver disease and have to limit fluids, talk with your doctor before you increase the amount of fluids you drink. · Include high-fiber foods in your diet each day. These include fruits, vegetables, beans, and whole grains. · Get at least 30 minutes of exercise on most days of the week. Walking is a good choice. You also may want to do other activities, such as running, swimming, cycling, or playing tennis or team sports. · Take a fiber supplement, such as Citrucel or Metamucil, every day. Read and follow all instructions on the label. · Schedule time each day for a bowel movement. A daily routine may help. Take your time having your bowel movement. · Support your feet with a small step stool when you sit on the toilet. This helps flex your hips and places your pelvis in a squatting position. · Your doctor may recommend an over-the-counter laxative to relieve your constipation. Examples are Milk of Magnesia and MiraLax. Read and follow all instructions on the label. Do not use laxatives on a long-term basis. When should you call for help? Call your doctor now or seek immediate medical care if:    · You have new or worse belly pain.     · You have new or worse nausea or vomiting.     · You have blood in your stools.    Watch closely for changes in your health, and be sure to contact your doctor if:    · Your constipation is getting worse.     · You do not get better as expected. Where can you learn more? Go to http://byron-isai.info/  Enter P343 in the search box to learn more about \"Constipation: Care Instructions. \"  Current as of: June 26, 2019Content Version: 12.4  © 3817-6488 [employer]. Care instructions adapted under license by [employer] (which disclaims liability or warranty for this information). If you have questions about a medical condition or this instruction, always ask your healthcare professional. [address] any warranty or liability for your use of this information. Hemorrhoids: Care Instructions  Your Care Instructions    Hemorrhoids are enlarged veins that develop in the anal canal. Bleeding during bowel movements, itching, swelling, and rectal pain are the most common symptoms. They can be uncomfortable at times, but hemorrhoids rarely are a serious problem. You can treat most hemorrhoids with simple changes to your diet and bowel habits. These changes include eating more fiber and not straining to pass stools.  Most hemorrhoids do not need surgery or other treatment unless they are very large and painful or bleed a lot.  Follow-up care is a key part of your treatment and safety. Be sure to make and go to all appointments, and call your doctor if you are having problems. It's also a good idea to know your test results and keep a list of the medicines you take. How can you care for yourself at home? · Sit in a few inches of warm water (sitz bath) 3 times a day and after bowel movements. The warm water helps with pain and itching. · Put ice on your anal area several times a day for 10 minutes at a time. Put a thin cloth between the ice and your skin. Follow this by placing a warm, wet towel on the area for another 10 to 20 minutes. · Take pain medicines exactly as directed. ? If the doctor gave you a prescription medicine for pain, take it as prescribed. ? If you are not taking a prescription pain medicine, ask your doctor if you can take an over-the-counter medicine. · Keep the anal area clean, but be gentle. Use water and a fragrance-free soap, such as [place], or use baby wipes or medicated pads, such as Tucks. · Wear cotton underwear and loose clothing to decrease moisture in the anal area. · Eat more fiber. Include foods such as whole-grain breads and cereals, raw vegetables, raw and dried fruits, and beans. · Drink plenty of fluids, enough so that your urine is light yellow or clear like water. If you have kidney, heart, or liver disease and have to limit fluids, talk with your doctor before you increase the amount of fluids you drink. · Use a stool softener that contains bran or psyllium. You can save money by buying bran or psyllium (available in bulk at most health food stores) and sprinkling it on foods or stirring it into fruit juice. Or you can use a product such as Metamucil or Hydrocil. · Practice healthy bowel habits. ? Go to the bathroom as soon as you have the urge. ? Avoid straining to pass stools. Relax and give yourself time to let things happen naturally.   ? Do not hold your breath while passing stools. ? Do not read while sitting on the toilet. Get off the toilet as soon as you have finished. · Take your medicines exactly as prescribed. Call your doctor if you think you are having a problem with your medicine. When should you call for help? Call 911 anytime you think you may need emergency care. For example, call if:    · You pass maroon or very bloody stools.    Call your doctor now or seek immediate medical care if:    · You have increased pain.     · You have increased bleeding.    Watch closely for changes in your health, and be sure to contact your doctor if:    · Your symptoms have not improved after 3 or 4 days. Where can you learn more? Go to http://byron-isai.info/  Enter F228 in the search box to learn more about \"Hemorrhoids: Care Instructions. \"  Current as of: August 11, 2019Content Version: 12.4  © 0630-6176 [employer]. Care instructions adapted under license by [employer] (which disclaims liability or warranty for this information). If you have questions about a medical condition or this instruction, always ask your healthcare professional. [place] [address] any warranty or liability for your use of this information.

## 2020-03-20 NOTE — ED NOTES
..Discharge summary and discharge medications reviewed with patient and appropriate educational materials and side effects teaching were provided. patient  Given 4 paper prescriptions and 0 electronic prescriptions sent to pt's listed pharmacy. Patient  verbalized understanding of the importance of discussing medications with his or her physician or clinic they will be following up with. No si/s of acute distress prior to discharge. Patient offered wheelchair from treatment area to hospital entrance, patient declined wheelchair.

## 2020-03-20 NOTE — ED TRIAGE NOTES
Pt reports to the ED with c/o having hemorrhoids x 1 week. Pt stated she recently had a baby at the beginning of the month. Denies bleeding or discharge. Pt reports using cortisone cream to help. Pt reported being constipated after her delivery. Denies abdominal pain. Pt is alert, oriented and appropriate. Ambulatory on arrival.       Emergency Department Nursing Plan of Care       The Nursing Plan of Care is developed from the Nursing assessment and Emergency Department Attending provider initial evaluation. The plan of care may be reviewed in the ED Provider note.     The Plan of Care was developed with the following considerations:   Patient / Family readiness to learn indicated by:verbalized understanding  Persons(s) to be included in education: patient  Barriers to Learning/Limitations:No    Signed     Lesta Scheuermann    3/19/2020   10:08 PM

## 2020-03-20 NOTE — ED PROVIDER NOTES
28-year-old female who is 19 days status post partum presents with rectal pain and swelling. Reports recent constipation. Denies rectal bleeding, drainage, fever, pelvic pain, back pain. Recently admitted for preeclampsia and concern regarding blood pressure. Hemorrhoids    Associated symptoms include constipation. Pertinent negatives include no abdominal pain, no dysuria, no abdominal distention, no chills, no fever, no nausea and no diarrhea. Past Medical History:   Diagnosis Date    Anemia during pregnancy in second trimester 10/1/2019    Chlamydia     Murmur 10/1/2019       History reviewed. No pertinent surgical history. History reviewed. No pertinent family history.     Social History     Socioeconomic History    Marital status: SINGLE     Spouse name: Not on file    Number of children: Not on file    Years of education: Not on file    Highest education level: Not on file   Occupational History    Not on file   Social Needs    Financial resource strain: Not on file    Food insecurity     Worry: Not on file     Inability: Not on file    Transportation needs     Medical: Not on file     Non-medical: Not on file   Tobacco Use    Smoking status: Former Smoker     Packs/day: 0.25     Last attempt to quit: 2019     Years since quittin.7    Smokeless tobacco: Never Used   Substance and Sexual Activity    Alcohol use: Not Currently     Frequency: Never    Drug use: No    Sexual activity: Yes     Partners: Male     Birth control/protection: None   Lifestyle    Physical activity     Days per week: Not on file     Minutes per session: Not on file    Stress: Not on file   Relationships    Social connections     Talks on phone: Not on file     Gets together: Not on file     Attends Sabianist service: Not on file     Active member of club or organization: Not on file     Attends meetings of clubs or organizations: Not on file     Relationship status: Not on file    Intimate partner violence     Fear of current or ex partner: Not on file     Emotionally abused: Not on file     Physically abused: Not on file     Forced sexual activity: Not on file   Other Topics Concern    Not on file   Social History Narrative    Not on file         ALLERGIES: Patient has no known allergies. Review of Systems   Constitutional: Negative. Negative for chills, fever and unexpected weight change. HENT: Negative. Negative for congestion and trouble swallowing. Eyes: Negative for discharge. Respiratory: Negative. Negative for cough, chest tightness and shortness of breath. Cardiovascular: Negative. Negative for chest pain. Gastrointestinal: Positive for constipation, hemorrhoids and rectal pain. Negative for abdominal distention, abdominal pain, blood in stool, diarrhea and nausea. Endocrine: Negative. Genitourinary: Negative. Negative for difficulty urinating, dysuria, frequency and urgency. Musculoskeletal: Negative. Negative for arthralgias and myalgias. Skin: Negative. Negative for color change. Allergic/Immunologic: Negative. Neurological: Negative. Negative for dizziness, speech difficulty and headaches. Hematological: Negative. Psychiatric/Behavioral: Negative. Negative for agitation and confusion. All other systems reviewed and are negative. Vitals:    03/19/20 2205   BP: (!) 149/104   Pulse: 75   Resp: 16   SpO2: 99%   Weight: 59 kg (130 lb)   Height: 5' 1\" (1.549 m)            Physical Exam  Vitals signs reviewed. Constitutional:       Appearance: She is well-developed. HENT:      Head: Normocephalic and atraumatic. Eyes:      Conjunctiva/sclera: Conjunctivae normal.   Neck:      Musculoskeletal: Neck supple. Cardiovascular:      Rate and Rhythm: Normal rate and regular rhythm. Pulmonary:      Effort: Pulmonary effort is normal. No respiratory distress. Abdominal:      Palpations: Abdomen is soft. Tenderness:  There is no abdominal tenderness. Genitourinary:     Comments: Large tender external hemorrhoid. Not thrombosed. Musculoskeletal: Normal range of motion. General: No deformity. Skin:     General: Skin is warm and dry. Neurological:      Mental Status: She is alert and oriented to person, place, and time. Psychiatric:         Behavior: Behavior normal.         Thought Content: Thought content normal.          MDM  Number of Diagnoses or Management Options  Constipation, unspecified constipation type:   Hemorrhoids, unspecified hemorrhoid type:   Diagnosis management comments: We will treat hemorrhoid pain and recheck blood pressure after pain has been controlled         Procedures    LABORATORY TESTS:  No results found for this or any previous visit (from the past 12 hour(s)). IMAGING RESULTS:  No orders to display       MEDICATIONS GIVEN:  Medications   oxyCODONE-acetaminophen (PERCOCET) 5-325 mg per tablet 2 Tab (2 Tabs Oral Given 3/19/20 2218)   lidocaine (XYLOCAINE) 2 % viscous solution 10 mL (10 mL Mucous Membrane Given 3/19/20 2218)       IMPRESSION:  1. Hemorrhoids, unspecified hemorrhoid type    2. Constipation, unspecified constipation type        PLAN:  1. Discharge Medication List as of 3/19/2020 10:39 PM      START taking these medications    Details   lidocaine (XYLOCAINE) 2 % jelly To hemorrhoid 4x daily as needed for pain, Print, Disp-2 mL, R-0      hydrocortisone (Anusol-HC) 25 mg supp Insert 1 Suppository into rectum every twelve (12) hours. , Print, Disp-20 Suppository, R-0      docusate sodium (COLACE) 100 mg capsule Take 1 Cap by mouth two (2) times a day for 90 days. Must take while using percocet, Print, Disp-60 Cap, R-2      oxyCODONE-acetaminophen (Percocet) 5-325 mg per tablet Take 1 Tab by mouth every eight (8) hours as needed for Pain for up to 5 days. Max Daily Amount: 3 Tabs.  Indications: pain, Print, Disp-12 Tab, R-0         CONTINUE these medications which have NOT CHANGED    Details sertraline (ZOLOFT) 50 mg tablet Take 1 Tab by mouth daily. Indications: Anxiousness associated with Depression, Print, Disp-30 Tab, R-0      aspirin 81 mg chewable tablet Take 81 mg by mouth daily. Indications: decrease your chance of preeclampsia, Historical Med      ferrous sulfate 325 mg (65 mg iron) tablet Take 325 mg by mouth every other day. Indications: anemia from inadequate iron, Historical Med      prenatal multivit-ca-min-fe-fa (PRENATAL VITAMIN) tab Take 1 Tab by mouth daily. , Print, Disp-30 Tab, R-0           2.    Follow-up Information     Follow up With Specialties Details Why [address] EMERGENCY DEPT Emergency Medicine  As needed, If symptoms worsen Josefina Estrella        Return to ED if worse

## 2020-11-08 ENCOUNTER — HOSPITAL ENCOUNTER (EMERGENCY)
Age: 25
Discharge: STILL ADMITTED OR ADMINISTRATIVE | End: 2020-11-08
Attending: EMERGENCY MEDICINE

## 2021-02-21 ENCOUNTER — HOSPITAL ENCOUNTER (EMERGENCY)
Age: 26
Discharge: HOME | End: 2021-02-21
Attending: EMERGENCY MEDICINE
Payer: COMMERCIAL

## 2021-02-21 VITALS
HEART RATE: 88 BPM | BODY MASS INDEX: 30.58 KG/M2 | WEIGHT: 162 LBS | DIASTOLIC BLOOD PRESSURE: 95 MMHG | RESPIRATION RATE: 18 BRPM | TEMPERATURE: 97.9 F | HEIGHT: 61 IN | SYSTOLIC BLOOD PRESSURE: 142 MMHG | OXYGEN SATURATION: 100 %

## 2021-02-21 DIAGNOSIS — N30.00 ACUTE CYSTITIS WITHOUT HEMATURIA: Primary | ICD-10-CM

## 2021-02-21 LAB
APPEARANCE UR: ABNORMAL
BACTERIA URNS QL MICRO: ABNORMAL /HPF
BILIRUB UR QL: NEGATIVE
CLUE CELLS VAG QL WET PREP: NORMAL
COLOR UR: ABNORMAL
EPITH CASTS URNS QL MICRO: ABNORMAL /LPF
GLUCOSE UR STRIP.AUTO-MCNC: NEGATIVE MG/DL
HCG UR QL: NEGATIVE
HGB UR QL STRIP: ABNORMAL
KETONES UR QL STRIP.AUTO: NEGATIVE MG/DL
KOH PREP SPEC: NORMAL
LEUKOCYTE ESTERASE UR QL STRIP.AUTO: ABNORMAL
NITRITE UR QL STRIP.AUTO: NEGATIVE
PH UR STRIP: 7.5 [PH] (ref 5–8)
PROT UR STRIP-MCNC: 30 MG/DL
RBC #/AREA URNS HPF: ABNORMAL /HPF (ref 0–5)
SERVICE CMNT-IMP: NORMAL
SP GR UR REFRACTOMETRY: 1.02 (ref 1–1.03)
T VAGINALIS VAG QL WET PREP: NORMAL
UA: UC IF INDICATED,UAUC: ABNORMAL
UROBILINOGEN UR QL STRIP.AUTO: 0.2 EU/DL (ref 0.2–1)
WBC URNS QL MICRO: >100 /HPF (ref 0–4)

## 2021-02-21 PROCEDURE — 87077 CULTURE AEROBIC IDENTIFY: CPT

## 2021-02-21 PROCEDURE — 87086 URINE CULTURE/COLONY COUNT: CPT

## 2021-02-21 PROCEDURE — 99284 EMERGENCY DEPT VISIT MOD MDM: CPT

## 2021-02-21 PROCEDURE — 81001 URINALYSIS AUTO W/SCOPE: CPT

## 2021-02-21 PROCEDURE — 81025 URINE PREGNANCY TEST: CPT

## 2021-02-21 PROCEDURE — 87186 SC STD MICRODIL/AGAR DIL: CPT

## 2021-02-21 PROCEDURE — 87491 CHLMYD TRACH DNA AMP PROBE: CPT

## 2021-02-21 PROCEDURE — 74011250636 HC RX REV CODE- 250/636: Performed by: EMERGENCY MEDICINE

## 2021-02-21 PROCEDURE — 87210 SMEAR WET MOUNT SALINE/INK: CPT

## 2021-02-21 PROCEDURE — 96372 THER/PROPH/DIAG INJ SC/IM: CPT

## 2021-02-21 PROCEDURE — 74011250637 HC RX REV CODE- 250/637: Performed by: EMERGENCY MEDICINE

## 2021-02-21 PROCEDURE — 74011000250 HC RX REV CODE- 250: Performed by: EMERGENCY MEDICINE

## 2021-02-21 RX ORDER — PHENAZOPYRIDINE HYDROCHLORIDE 200 MG/1
200 TABLET, FILM COATED ORAL 3 TIMES DAILY
Qty: 6 TAB | Refills: 0 | Status: SHIPPED | OUTPATIENT
Start: 2021-02-21 | End: 2021-02-23

## 2021-02-21 RX ORDER — CEPHALEXIN 500 MG/1
500 CAPSULE ORAL 3 TIMES DAILY
Qty: 21 CAP | Refills: 0 | Status: SHIPPED | OUTPATIENT
Start: 2021-02-21 | End: 2021-02-28

## 2021-02-21 RX ORDER — AZITHROMYCIN 500 MG/1
1000 TABLET, FILM COATED ORAL
Status: COMPLETED | OUTPATIENT
Start: 2021-02-21 | End: 2021-02-21

## 2021-02-21 RX ORDER — PHENAZOPYRIDINE HYDROCHLORIDE 100 MG/1
200 TABLET, FILM COATED ORAL
Status: COMPLETED | OUTPATIENT
Start: 2021-02-21 | End: 2021-02-21

## 2021-02-21 RX ORDER — PHENAZOPYRIDINE HYDROCHLORIDE 200 MG/1
200 TABLET, FILM COATED ORAL 3 TIMES DAILY
Qty: 6 TAB | Refills: 0 | Status: SHIPPED | OUTPATIENT
Start: 2021-02-21 | End: 2021-02-21 | Stop reason: SDUPTHER

## 2021-02-21 RX ORDER — CEPHALEXIN 500 MG/1
500 CAPSULE ORAL 3 TIMES DAILY
Qty: 21 CAP | Refills: 0 | Status: SHIPPED | OUTPATIENT
Start: 2021-02-21 | End: 2021-02-21 | Stop reason: SDUPTHER

## 2021-02-21 RX ADMIN — LIDOCAINE HYDROCHLORIDE 500 MG: 10 INJECTION, SOLUTION EPIDURAL; INFILTRATION; INTRACAUDAL; PERINEURAL at 09:10

## 2021-02-21 RX ADMIN — AZITHROMYCIN MONOHYDRATE 1000 MG: 500 TABLET ORAL at 09:10

## 2021-02-21 RX ADMIN — PHENAZOPYRIDINE 200 MG: 100 TABLET ORAL at 09:11

## 2021-02-21 NOTE — ED NOTES
Patient presents to ED with c/o vaginal discharge and concern for std. Patient is alert and oriented x 4 and in no acute distress at this time. Respirations are at a regular rate, depth, and pattern. Patient updated on plan of care and has no questions or concerns at this time. Call bell within reach. Will continue to monitor. Please reference nursing assessment. Emergency Department Nursing Plan of Care       The Nursing Plan of Care is developed from the Nursing assessment and Emergency Department Attending provider initial evaluation. The plan of care may be reviewed in the ED Provider note.     The Plan of Care was developed with the following considerations:   Patient / Family readiness to learn indicated by:verbalized understanding and successful return demonstration  Persons(s) to be included in education: patient  Barriers to Learning/Limitations:No    Signed     1501 Aria Wagner RN    2/21/2021   8:15AM

## 2021-02-23 LAB
BACTERIA SPEC CULT: ABNORMAL
CC UR VC: ABNORMAL
SERVICE CMNT-IMP: ABNORMAL

## 2021-03-07 ENCOUNTER — HOSPITAL ENCOUNTER (EMERGENCY)
Age: 26
Discharge: HOME | End: 2021-03-07
Attending: EMERGENCY MEDICINE | Admitting: EMERGENCY MEDICINE
Payer: COMMERCIAL

## 2021-03-07 VITALS
HEART RATE: 71 BPM | DIASTOLIC BLOOD PRESSURE: 89 MMHG | TEMPERATURE: 98.1 F | SYSTOLIC BLOOD PRESSURE: 125 MMHG | OXYGEN SATURATION: 100 % | RESPIRATION RATE: 20 BRPM | BODY MASS INDEX: 30.58 KG/M2 | HEIGHT: 61 IN | WEIGHT: 162 LBS

## 2021-03-07 DIAGNOSIS — R51.9 ACUTE NONINTRACTABLE HEADACHE, UNSPECIFIED HEADACHE TYPE: Primary | ICD-10-CM

## 2021-03-07 DIAGNOSIS — F41.1 ANXIETY STATE: ICD-10-CM

## 2021-03-07 PROCEDURE — 99283 EMERGENCY DEPT VISIT LOW MDM: CPT

## 2021-03-07 PROCEDURE — 74011250637 HC RX REV CODE- 250/637: Performed by: EMERGENCY MEDICINE

## 2021-03-07 RX ORDER — BUTALBITAL, ACETAMINOPHEN AND CAFFEINE 300; 40; 50 MG/1; MG/1; MG/1
1 CAPSULE ORAL
Qty: 20 CAP | Refills: 0 | Status: SHIPPED | OUTPATIENT
Start: 2021-03-07

## 2021-03-07 RX ORDER — HYDROXYZINE 50 MG/1
50 TABLET, FILM COATED ORAL
Qty: 20 TAB | Refills: 0 | Status: SHIPPED | OUTPATIENT
Start: 2021-03-07 | End: 2021-03-17

## 2021-03-07 RX ORDER — BUTALBITAL, ACETAMINOPHEN AND CAFFEINE 50; 325; 40 MG/1; MG/1; MG/1
1 TABLET ORAL
Status: COMPLETED | OUTPATIENT
Start: 2021-03-07 | End: 2021-03-07

## 2021-03-07 RX ADMIN — BUTALBITAL, ACETAMINOPHEN, AND CAFFEINE 1 TABLET: 50; 325; 40 TABLET ORAL at 20:26

## 2021-03-08 NOTE — DISCHARGE INSTRUCTIONS
You were evaluated in the emergency department for headache and anxiety. It will be important for you to follow-up with your primary care physician in  7-14 days. If you develop worsening symptoms such as fevers, please return to the emergency department immediately.     Local Primary Care Physicians  [facility] [phone]  MD Leonel Woods MD Traci Cromer, MD [facility] [phone]  Johnathan Wade, [facility]  Faina Virgen, MD Ferny Vega MD [address]  [phone]  Benuel Homans[place] [address] [facility] [phone]  MD Marisol Ulrich, MD Ned Mendez, MD Tiffany Lance MD   [facility] [phone]  JWTF XEQSPS TT, MD Tasha Moncada, MD Darryl Lyle, NP [address] [phone]  Maame Joshua, MD Abimael Guajardo, MD Bonnie Chaves, MD Carli Hinojosa, MD Leobardo Malone, MD Aretha Joseph MD   2576 [facility] [phone]  Bam Crockett MD [facility] [phone]  MD Raymond Basilio, NP  Yannick Domínguez, MD Regino Servin, MD Raul Mays, MD Jonathan Burleson MD   4571 [facility] [phone]  MD Loida Steven, FNP  Parker Galdamez, NP  Kirsten Bañuelos, MD Alfonso Reagan MD Rodgers Bali, MD 4992 [facility] [phone]  MD Juhi Diaz, MD Kenia Reardon MD Grey Reap, MD   [address] [phone]  MD Laila Palmer MD [facility] [phone]  MD Rebeca Dominguez MD Nonah Munster, 3100 [facility] [phone]  MD Sahil Gonzalez MD Umberto Gower, MD Heywood Reilly, MD Adolm Bonds, MD Dorena Searing, SARAHY Harrington MD [employer] [address]  MD Cass Riley MD Horacio Africa, MD   2102 [facility] [phone]  MD Gurjit Bustillo, ANTONY Gomez, JUNI Gomez, JUNI Hawk MD Rhonda Helper, NP   Singh Alex,              Miscellaneous:  Brian Urena MD [facility] Departments     For adult and child immunizations, family planning, TB screening, STD testing and women's health services. [facility] [place] [phone]      [facility] D 25   [address]   [address]   170 [facility]: Ale Zayas 200 [facility] [phone]      [address] [place] [address]          [address] [address]     For primary care services, woman and child wellness, and some clinics providing specialty care. [facility] -- [address] [phone]   [address] [place] [address] 200 [facility] [address] [place] [address] [phone]   339 [facility] [address]  [phone]   [address] [place] [address] [address] [place] [address] [address] [place] [address]  [phone]   7706 [facility] [address] [place] [phone]   [place] 81 [facility] [phone]   [facility] [address] [place] [address] [phone]   [facility] [address], ext Pradip [address] [facility], [address] [phone]     [address] [phone]   [facility] 5850 [facility]  [phone]   [employer]  [address] ([email or web address]) [phone]         Sexual Health/Woman Wellness Clinics    For STD/HIV testing and treatment, pregnancy testing and services, men's health, birth control services, LGBT services, and hepatitis/HPV vaccine services. [employer] for [place] [employer] 201 [address]. [facility] [address] [facility] [address] [facility] 1579 600 [address]ICK[address] Crockett [facility] [phone]   [facility] [address], 5th floor [phone]   Pregnancy 3928 [facility] [address] for Women [address]  Alexx Hernandez [phone]         [place] [address] [facility] [address] [place] [address]   [phone]   [place]   [phone]   Women, Infant and Children's Services: [address] [phone]       600 [facility]   [phone]   [address]   [facility]     [phone]   [address] Crisis   1212 [facility] [phone]

## 2021-03-08 NOTE — ED NOTES
Pt presents ambulatory to ED complaining of intermittent HA x 2 weeks without blurry vision. Pt reports that she has had high BP since her son was born last year. Pt reports that she is cutting back on smoking cigarettes. Pt does not have a PCP. She was provided with information on PCP at [facility]. Pt says she would also like anxiety medication. Pt is alert and oriented x 4, RR even and unlabored, skin is warm and dry. Assesment completed and pt updated on plan of care. Emergency Department Nursing Plan of Care       The Nursing Plan of Care is developed from the Nursing assessment and Emergency Department Attending provider initial evaluation. The plan of care may be reviewed in the ED Provider note.     The Plan of Care was developed with the following considerations:   Patient / Family readiness to learn indicated by:verbalized understanding  Persons(s) to be included in education: patient  Barriers to Learning/Limitations:No    Signed     [address], RN    3/7/2021   7:56 PM

## 2021-03-08 NOTE — ED NOTES
Discharge instructions were given to the patient by Keo Lucero RN. The patient left the Emergency Department ambulatory, alert and oriented and in no acute distress with 2 prescriptions. The patient was encouraged to call or return to the ED for worsening issues or problems and was encouraged to schedule a follow up appointment for continuing care. The patient verbalized understanding of discharge instructions and prescriptions, all questions were answered. The patient has no further concerns at this time.

## 2021-03-08 NOTE — ED PROVIDER NOTES
EMERGENCY DEPARTMENT HISTORY AND PHYSICAL EXAM      Date: 3/7/2021  Patient Name: Kinsey Walker    History of Presenting Illness     Chief Complaint   Patient presents with    Headache       History Provided By: Patient    HPI: Kinsey Walker, 26 y.o. female without significant medical history presents to the ED with cc of headache and anxiety. Patient states that she always has history of headache but over the past 2 weeks she has had mildly worsening headache. She feels that this is due to her blood pressure. She does not check her blood pressure at home but states \"every time I go to the doctor they tell me it is high but not too high\". She has history of preeclampsia about 1 year ago but no history of hypertension. She describes headache as mild and described as sharp and stabbing located to bilateral frontal head with diffuse radiation throughout bilateral temple area and into the back of her head. She states that she always has headaches but this is unusual and that Tylenol and ibuprofen Almon Macadamia make it go away for short time\". Denies any thunderclap onset or worst headache of her life. Denies any photophobia, nausea, vomiting, weakness or numbness in extremities, paresthesias, or dizziness. There are no other complaints, changes, or physical findings at this time. PCP: None    No current facility-administered medications on file prior to encounter. Current Outpatient Medications on File Prior to Encounter   Medication Sig Dispense Refill    [DISCONTINUED] lidocaine (XYLOCAINE) 2 % jelly To hemorrhoid 4x daily as needed for pain 2 mL 0    [DISCONTINUED] hydrocortisone (Anusol-HC) 25 mg supp Insert 1 Suppository into rectum every twelve (12) hours. 20 Suppository 0    sertraline (ZOLOFT) 50 mg tablet Take 1 Tab by mouth daily. Indications: Anxiousness associated with Depression 30 Tab 0    [DISCONTINUED] aspirin 81 mg chewable tablet Take 81 mg by mouth daily.  Indications: decrease your chance of preeclampsia      [DISCONTINUED] ferrous sulfate 325 mg (65 mg iron) tablet Take 325 mg by mouth every other day. Indications: anemia from inadequate iron      prenatal multivit-ca-min-fe-fa (PRENATAL VITAMIN) tab Take 1 Tab by mouth daily. 27 Tab 0       Past History     Past Medical History:  Past Medical History:   Diagnosis Date    Anemia during pregnancy in second trimester 10/1/2019    Chlamydia     Murmur 10/1/2019       Past Surgical History:  History reviewed. No pertinent surgical history. Family History:  History reviewed. No pertinent family history. Social History:  Social History     Tobacco Use    Smoking status: Light Tobacco Smoker     Packs/day: 0.25     Last attempt to quit: 2019     Years since quittin.6    Smokeless tobacco: Never Used   Substance Use Topics    Alcohol use: Not Currently     Frequency: Never    Drug use: No       Allergies:  No Known Allergies      Review of Systems   Review of Systems   Constitutional: Negative for chills and fever. Eyes: Negative for photophobia and visual disturbance. Respiratory: Negative for shortness of breath. Cardiovascular: Negative for chest pain. Gastrointestinal: Negative for abdominal pain, nausea and vomiting. Skin: Negative for color change and rash. Neurological: Positive for headaches. Negative for dizziness, weakness, light-headedness and numbness. All other systems reviewed and are negative. Physical Exam   Physical Exam  Vitals signs and nursing note reviewed. Constitutional:       General: She is not in acute distress. Appearance: Normal appearance. She is not ill-appearing, toxic-appearing or diaphoretic. HENT:      Head: Normocephalic and atraumatic. Cardiovascular:      Rate and Rhythm: Normal rate and regular rhythm. Heart sounds: Normal heart sounds. No murmur. Pulmonary:      Effort: Pulmonary effort is normal. No respiratory distress.       Breath sounds: Normal breath sounds. No wheezing. Skin:     General: Skin is warm and dry. Findings: No erythema or rash. Neurological:      General: No focal deficit present. Mental Status: She is alert and oriented to person, place, and time. Cranial Nerves: No cranial nerve deficit. Motor: No weakness. Comments: Ambulatory in ED without difficulty         Diagnostic Study Results     Labs -   No results found for this or any previous visit (from the past 12 hour(s)). Radiologic Studies -   No orders to display     CT Results  (Last 48 hours)    None        CXR Results  (Last 48 hours)    None          Medical Decision Making   I am the first provider for this patient. I reviewed the vital signs, available nursing notes, past medical history, past surgical history, family history and social history. Vital Signs-Reviewed the patient's vital signs. Patient Vitals for the past 12 hrs:   Temp Pulse Resp BP SpO2   03/07/21 1955 -- -- -- 125/89 --   03/07/21 1940 98.1 °F (36.7 °C) 71 20 (!) 152/92 100 %       Records Reviewed: Nursing Notes    Provider Notes (Medical Decision Making):   80-year-old female here with headache, concern about blood pressure, and anxiety. She is requesting medication for this. She states currently her headache is not severe, only a 1/10 currently. She is afebrile and vital signs are stable. She has no focal neurologic deficits. Exam as above. Blood pressure 125/89. I feel it would be inappropriate to start her on antihypertensives. Headache and anxiety likely due to her busy lifestyle, being a mother of 3 children. She does not have any red flags concerning for subarachnoid hemorrhage. I will prescribe hydroxyzine and Fioricet to use as needed. Encourage close follow-up with a PCP and given strict return precautions. All questions answered and she agrees with plan as above. ED Course:   Initial assessment performed.  The patients presenting problems have been discussed, and they are in agreement with the care plan formulated and outlined with them. I have encouraged them to ask questions as they arise throughout their visit. TOBACCO COUNSELING:   Upon evaluation, the patient expressed that they are a current tobacco user. For at least 3 minutes, the patient has been counseled on the dangers of smoking and was encouraged to quit as soon as possible in order to decrease further risks to their health. Patient has conveyed their understanding of the risks involved should they continue to use tobacco products. Patient has been offered various resources to help with their tobacco dependence. Discharge Note:  The patient has been re-evaluated and is ready for discharge. Reviewed available results with patient. Counseled patient on diagnosis and care plan. Patient has expressed understanding, and all questions have been answered. Patient agrees with plan and agrees to follow up as recommended, or to return to the ED if their symptoms worsen. Discharge instructions have been provided and explained to the patient, along with reasons to return to the ED. Disposition:  Discharge    DISCHARGE PLAN:  1. Current Discharge Medication List      START taking these medications    Details   butalbital-acetaminophen-caff (FIORICET) -40 mg per capsule Take 1 Cap by mouth every four (4) hours as needed for Headache. Qty: 20 Cap, Refills: 0      hydrOXYzine HCL (ATARAX) 50 mg tablet Take 1 Tab by mouth every six (6) hours as needed for Anxiety for up to 10 days. Qty: 20 Tab, Refills: 0           2.    Follow-up Information     Follow up With Specialties Details Why [address] Internal Medicine Schedule an appointment as soon as possible for a visit  to establish with a PCP Brigitte Elaine  [address] [facility] [address] [facility] [address]    137 [facility] EMERGENCY DEPT Emergency Medicine Go to  As needed, If symptoms worsen [address] [facility] [address]  [phone]        3. Return to ED if worse     Diagnosis     Clinical Impression:   1. Acute nonintractable headache, unspecified headache type    2. Anxiety state        Attestations:  I am the first and primary provider of record for this patient's ED encounter. I personally performed the services described above in this documentation. Zainab Baker MD    Please note that this dictation was completed with Stuffle, the computer voice recognition software. Quite often unanticipated grammatical, syntax, homophones, and other interpretive errors are inadvertently transcribed by the computer software. Please disregard these errors. Please excuse any errors that have escaped final proofreading. Thank you.

## 2021-03-08 NOTE — ED TRIAGE NOTES
Pt reporting intermittent headache x2wks relieved by sleep. Minimal relief with tylenol, ibuprofen, bc. Pt says she is here because she needs BP meds. Never been prescribed BP meds before. No PCP.

## 2021-07-23 ENCOUNTER — HOSPITAL ENCOUNTER (EMERGENCY)
Age: 26
Discharge: HOME | End: 2021-07-23
Attending: EMERGENCY MEDICINE
Payer: COMMERCIAL

## 2021-07-23 VITALS
BODY MASS INDEX: 31.15 KG/M2 | OXYGEN SATURATION: 100 % | DIASTOLIC BLOOD PRESSURE: 86 MMHG | RESPIRATION RATE: 16 BRPM | SYSTOLIC BLOOD PRESSURE: 134 MMHG | HEIGHT: 61 IN | HEART RATE: 67 BPM | WEIGHT: 165 LBS | TEMPERATURE: 98.2 F

## 2021-07-23 DIAGNOSIS — R03.0 ELEVATED BLOOD PRESSURE READING: Primary | ICD-10-CM

## 2021-07-23 PROCEDURE — 99283 EMERGENCY DEPT VISIT LOW MDM: CPT

## 2021-07-23 NOTE — ED PROVIDER NOTES
41-year-old female presents anxious about her blood pressure because she had a party at her grandmother's house and everybody began playing with her grandmother's blood pressure cuff. When she checked hers it was 170/128. Patient denies personal history of high blood pressure but reports a family history of high blood pressure. She does note that since the birth of her last child, every time she has the doctor they remarked that her blood pressure is \"slightly\" up but has not been this high. Patient currently denies headache, visual change, chest pain, shortness of breath, difficulty urinating. Patient has a history of migraines and she had a headache and some dizziness yesterday. Took migraine medications and that has resolved. Past Medical History:   Diagnosis Date    Anemia during pregnancy in second trimester 10/1/2019    Chlamydia     Murmur 10/1/2019       History reviewed. No pertinent surgical history. History reviewed. No pertinent family history. Social History     Socioeconomic History    Marital status: SINGLE     Spouse name: Not on file    Number of children: Not on file    Years of education: Not on file    Highest education level: Not on file   Occupational History    Not on file   Tobacco Use    Smoking status: Light Tobacco Smoker     Packs/day: 0.25    Smokeless tobacco: Never Used   Substance and Sexual Activity    Alcohol use: Not Currently    Drug use: No    Sexual activity: Yes     Partners: Male     Birth control/protection: None   Other Topics Concern    Not on file   Social History Narrative    Not on file     Social Determinants of Health     Financial Resource Strain:     Difficulty of Paying Living Expenses:    Food Insecurity:     Worried About Running Out of Food in the Last Year:     [address] in the Last Year:    Transportation Needs:     Lack of Transportation (Medical):      Lack of Transportation (Non-Medical):    Physical Activity:  Days of Exercise per Week:     Minutes of Exercise per Session:    Stress:     Feeling of Stress :    Social Connections:     Frequency of Communication with Friends and Family:     Frequency of Social Gatherings with Friends and Family:     Attends Yaz[employer] Services:     Active Member of Clubs or Organizations:     Attends Club or Organization Meetings:     Marital Status:    Intimate Partner Violence:     Fear of Current or Ex-Partner:     Emotionally Abused:     Physically Abused:     Sexually Abused: ALLERGIES: Patient has no known allergies. Review of Systems   Constitutional: Negative. Negative for chills, fever and unexpected weight change. HENT: Negative. Negative for congestion and trouble swallowing. Eyes: Negative for discharge. Respiratory: Negative. Negative for cough, chest tightness and shortness of breath. Cardiovascular: Negative. Negative for chest pain. Gastrointestinal: Negative. Negative for abdominal distention, abdominal pain, constipation, diarrhea and nausea. Endocrine: Negative. Genitourinary: Negative. Negative for difficulty urinating, dysuria, frequency and urgency. Musculoskeletal: Negative. Negative for arthralgias and myalgias. Skin: Negative. Negative for color change. Allergic/Immunologic: Negative. Neurological: Negative. Negative for dizziness, speech difficulty and headaches. Hematological: Negative. Psychiatric/Behavioral: Negative for agitation and confusion. The patient is nervous/anxious. All other systems reviewed and are negative. Vitals:    07/23/21 0059 07/23/21 0124 07/23/21 0127   BP: (!) 173/102  134/86   Pulse: 68  67   Resp: 16     Temp: 98.2 °F (36.8 °C)     SpO2: 100% 100% 100%   Weight: 74.8 kg (165 lb)     Height: 5' 1\" (1.549 m)              Physical Exam  Vitals and nursing note reviewed. Constitutional:       Appearance: She is well-developed.    HENT:      Head: Normocephalic and atraumatic. Eyes:      Conjunctiva/sclera: Conjunctivae normal.   Cardiovascular:      Rate and Rhythm: Normal rate and regular rhythm. Pulmonary:      Effort: Pulmonary effort is normal. No respiratory distress. Abdominal:      Palpations: Abdomen is soft. Tenderness: There is no abdominal tenderness. Musculoskeletal:         General: No deformity. Normal range of motion. Cervical back: Neck supple. Skin:     General: Skin is warm and dry. Neurological:      Mental Status: She is alert and oriented to person, place, and time. Psychiatric:         Behavior: Behavior normal.         Thought Content: Thought content normal.          MDM  Number of Diagnoses or Management Options  Elevated blood pressure reading  Diagnosis management comments: Transient elevation in blood pressure without evidence of endorgan damage. Neurologically nonfocal.   Blood pressure improved after patient was put in exam room. Patient states, Noah Mejia is because I relaxed. \"  Patient counseled to follow-up with PCP for blood pressure recheck, possible initiation of antihypertensive         Procedures      LABORATORY TESTS:  No results found for this or any previous visit (from the past 12 hour(s)). IMAGING RESULTS:  No orders to display       MEDICATIONS GIVEN:  Medications - No data to display    IMPRESSION:  1. Elevated blood pressure reading        PLAN:  1. Discharge Medication List as of 7/23/2021  1:58 AM        2.    Follow-up Information     Follow up With Specialties Details Why Contact Info    Your PCP        [facility] [place] EMERGENCY DEPT Emergency Medicine  As needed, If symptoms worsen Miguel A Burt  [phone]        Return to ED if worse

## 2021-07-23 NOTE — ED NOTES
Patient (s) given copy of dc instructions and 0 script(s). Patient (s) verbalized understanding of instructions and script (s). Patient given a current medication reconciliation form and verbalized understanding of their medications. Patient (s) verbalized understanding of the importance of discussing medications with  his or her physician or clinic they will be following up with. Patient alert and oriented and in no acute distress. Patient discharged home ambulatory.     Pt to follow up w/ PCP

## 2021-07-23 NOTE — ED TRIAGE NOTES
Pt concerned as she checked her BP at home and it was 170/128. In ED BP is 173/102. Denies hx of HTN. Denies dizziness, denies headaches. Pt does not have a PCP.

## 2021-07-23 NOTE — ED NOTES
Emergency Department Nursing Plan of Care       The Nursing Plan of Care is developed from the Nursing assessment and Emergency Department Attending provider initial evaluation. The plan of care may be reviewed in the ED Provider note.     The Plan of Care was developed with the following considerations:   Patient / Family readiness to learn indicated by:verbalized understanding  Persons(s) to be included in education: patient  Barriers to Learning/Limitations:No    Signed     Roberto Wolf    7/23/2021   1:39 AM

## 2021-08-22 ENCOUNTER — HOSPITAL ENCOUNTER (EMERGENCY)
Age: 26
Discharge: HOME | End: 2021-08-22
Attending: EMERGENCY MEDICINE
Payer: COMMERCIAL

## 2021-08-22 ENCOUNTER — APPOINTMENT (OUTPATIENT)
Dept: CT IMAGING | Age: 26
End: 2021-08-22
Attending: EMERGENCY MEDICINE
Payer: COMMERCIAL

## 2021-08-22 VITALS
WEIGHT: 165 LBS | DIASTOLIC BLOOD PRESSURE: 89 MMHG | OXYGEN SATURATION: 99 % | HEIGHT: 61 IN | HEART RATE: 74 BPM | BODY MASS INDEX: 31.15 KG/M2 | RESPIRATION RATE: 16 BRPM | SYSTOLIC BLOOD PRESSURE: 133 MMHG | TEMPERATURE: 98.6 F

## 2021-08-22 DIAGNOSIS — R51.9 NONINTRACTABLE HEADACHE, UNSPECIFIED CHRONICITY PATTERN, UNSPECIFIED HEADACHE TYPE: Primary | ICD-10-CM

## 2021-08-22 DIAGNOSIS — R03.0 ELEVATED BLOOD PRESSURE READING: ICD-10-CM

## 2021-08-22 PROCEDURE — 99283 EMERGENCY DEPT VISIT LOW MDM: CPT

## 2021-08-22 PROCEDURE — 70450 CT HEAD/BRAIN W/O DYE: CPT

## 2021-08-22 PROCEDURE — 74011250637 HC RX REV CODE- 250/637: Performed by: EMERGENCY MEDICINE

## 2021-08-22 RX ORDER — BUTALBITAL, ACETAMINOPHEN AND CAFFEINE 50; 325; 40 MG/1; MG/1; MG/1
1 TABLET ORAL
Status: COMPLETED | OUTPATIENT
Start: 2021-08-22 | End: 2021-08-22

## 2021-08-22 RX ORDER — HYDROCHLOROTHIAZIDE 25 MG/1
12.5 TABLET ORAL DAILY
Qty: 15 TABLET | Refills: 0 | Status: SHIPPED | OUTPATIENT
Start: 2021-08-22 | End: 2021-09-21

## 2021-08-22 RX ORDER — BUTALBITAL, ACETAMINOPHEN AND CAFFEINE 300; 40; 50 MG/1; MG/1; MG/1
1 CAPSULE ORAL
Qty: 20 CAPSULE | Refills: 0 | Status: SHIPPED | OUTPATIENT
Start: 2021-08-22

## 2021-08-22 RX ADMIN — BUTALBITAL, ACETAMINOPHEN, AND CAFFEINE 1 TABLET: 50; 325; 40 TABLET ORAL at 21:03

## 2021-08-22 NOTE — ED NOTES
Patient to room 10. She states, \"My blood pressure has been high since my last pregnancy in March 2020. \"

## 2021-08-23 NOTE — ED NOTES
Patient  given copy of dc instructions . Patient verbalized understanding of instructions. Patient given a current medication reconciliation form and verbalized understanding of their medications. Patient  verbalized understanding of the importance of discussing medications with  his or her physician or clinic they will be following up with. Patient alert and oriented and in no acute distress. Patient discharged home ambulatory.

## 2021-08-23 NOTE — ED PROVIDER NOTES
EMERGENCY DEPARTMENT HISTORY AND PHYSICAL EXAM      Date: 8/22/2021  Patient Name: Abdirahman Simmons    Please note that this dictation was completed with Pogo[employer], the computer voice recognition software. Quite often unanticipated grammatical, syntax, homophones, and other interpretive errors are inadvertently transcribed by the computer software. Please disregard these errors. Please excuse any errors that have escaped final proofreading. History of Presenting Illness     Chief Complaint   Patient presents with    Hypertension     and headaches, states /106 at home and running around there lately, BP issues ever since last pregnancy    Headache     thinks HA r/t BP, states gave medicine last time she was seen that helped alot, probbably Fioricet       History Provided By: Patient     HPI: Abdirahman Simmons, 23 y.o. female, presenting the emergency department complaining of headache, high blood pressure. Patient states she has been having daily headaches since she gave birth to a child in March. Headache is described as frontal, nonradiating, no associated nausea vomiting. No visual disturbances, no focal neurologic deficit. It is normally relieved by Fioricet which she has run out of. She has not anything for high blood pressure. Denies any chest pain or shortness of breath. No other exacerbating relieving factors or associated symptoms. She decided to come in again today she checked her blood pressure at home and found to be elevated. PCP: None    No current facility-administered medications on file prior to encounter. Current Outpatient Medications on File Prior to Encounter   Medication Sig Dispense Refill    butalbital-acetaminophen-caff (FIORICET) -40 mg per capsule Take 1 Cap by mouth every four (4) hours as needed for Headache. (Patient not taking: Reported on 7/23/2021) 20 Cap 0    sertraline (ZOLOFT) 50 mg tablet Take 1 Tab by mouth daily.  Indications: Anxiousness associated with Depression (Patient not taking: Reported on 7/23/2021) 30 Tab 0    prenatal multivit-ca-min-fe-fa (PRENATAL VITAMIN) tab Take 1 Tab by mouth daily. (Patient not taking: Reported on 7/23/2021) 30 Tab 0       Past History     Past Medical History:  Past Medical History:   Diagnosis Date    Anemia during pregnancy in second trimester 10/1/2019    Chlamydia     Hypertension     Murmur 10/1/2019       Past Surgical History:  History reviewed. No pertinent surgical history. Family History:  History reviewed. No pertinent family history. Social History:  Social History     Tobacco Use    Smoking status: Light Tobacco Smoker     Packs/day: 0.25    Smokeless tobacco: Never Used   Substance Use Topics    Alcohol use: Not Currently    Drug use: No       Allergies:  No Known Allergies      Review of Systems   Review of Systems   Constitutional: Negative for chills and fever. HENT: Negative for congestion and sore throat. Eyes: Negative for visual disturbance. Respiratory: Negative for cough and shortness of breath. Cardiovascular: Negative for chest pain and leg swelling. Gastrointestinal: Negative for abdominal pain, blood in stool, diarrhea and nausea. Endocrine: Negative for polyuria. Genitourinary: Negative for dysuria, flank pain, vaginal bleeding and vaginal discharge. Musculoskeletal: Negative for myalgias. Skin: Negative for rash. Allergic/Immunologic: Negative for immunocompromised state. Neurological: Positive for headaches. Negative for weakness. Psychiatric/Behavioral: Negative for confusion. Physical Exam   Physical Exam  Vitals and nursing note reviewed. Constitutional:       Appearance: She is well-developed. HENT:      Head: Normocephalic and atraumatic. Eyes:      General:         Right eye: No discharge. Left eye: No discharge. Conjunctiva/sclera: Conjunctivae normal.      Pupils: Pupils are equal, round, and reactive to light.    Neck: Trachea: No tracheal deviation. Cardiovascular:      Rate and Rhythm: Normal rate and regular rhythm. Heart sounds: Normal heart sounds. No murmur heard. Pulmonary:      Effort: Pulmonary effort is normal. No respiratory distress. Breath sounds: Normal breath sounds. No wheezing or rales. Abdominal:      General: Bowel sounds are normal.      Palpations: Abdomen is soft. Tenderness: There is no abdominal tenderness. There is no guarding or rebound. Musculoskeletal:         General: No tenderness or deformity. Normal range of motion. Cervical back: Normal range of motion and neck supple. Skin:     General: Skin is warm and dry. Findings: No erythema or rash. Neurological:      Mental Status: She is alert and oriented to person, place, and time. Psychiatric:         Behavior: Behavior normal.         Diagnostic Study Results     Labs -   No results found for this or any previous visit (from the past 12 hour(s)). Radiologic Studies -   CT HEAD WO CONT   Final Result   No acute intracranial process. CT Results  (Last 48 hours)               08/22/21 2025  CT HEAD WO CONT Final result    Impression:  No acute intracranial process. Narrative:  EXAM: CT HEAD WO CONT    CLINICAL HISTORY: headache   INDICATION: headache   COMPARISON: None. CT dose reduction was achieved through use of a standardized protocol tailored   for this examination and automatic exposure control for dose modulation. TECHNIQUE: Serial axial images with a collimation of 5 mm were obtained from the   skull base through the vertex     FINDINGS:    The sulci and ventricles are within normal limits for patient age. There is no   evidence of an acute infarction, hemorrhage, or mass-effect. There is no   evidence of midline shift or hydrocephalus. Posterior fossa structures are   unremarkable. No extra-axial collections are seen. Mastoid air cells are well pneumatized and clear.      There is no evidence of depressed skull fractures of soft tissue swelling. CXR Results  (Last 48 hours)    None            Medical Decision Making   I am the first provider for this patient. I reviewed the vital signs, available nursing notes, past medical history, past surgical history, family history and social history. Vital Signs-Reviewed the patient's vital signs. Patient Vitals for the past 12 hrs:   Temp Pulse Resp BP SpO2   08/22/21 1958 -- -- 16 133/89 --   08/22/21 1906 98.6 °F (37 °C) 74 18 (!) 146/97 99 %           Records Reviewed:   Nursing notes, Prior visits     Provider Notes (Medical Decision Making):   Patient here with what appears to be a benign headache, given multiple frequent visits will obtain imaging. No concern at this time for pseudotumor, no need for LP, doubt SAH. Will provide some Fioricet. Will have patient follow-up with neurology. Blood pressure 133/89 at this time. I do not think that she necessarily needs long-term antihypertensives, but given multiple frequent visits for high blood pressure we could start her on a low-dose of HCTZ or amlodipine, I do not think this would affect her headaches. ED Course:   Initial assessment performed. The patients presenting problems have been discussed, and they are in agreement with the care plan formulated and outlined with them. I have encouraged them to ask questions as they arise throughout their visit. Critical Care Time:   none    Disposition:    DISCHARGE NOTE  Patients results have been reviewed with them. Patient and/or family have verbally conveyed their understanding and agreement of the patient's signs, symptoms, diagnosis, treatment and prognosis and additionally agree to follow up as recommended or return to the Emergency Room should their condition change or have any new concerns prior to their follow-up appointment.  Patient verbally agrees with the care-plan and verbally conveys that all of their questions have been answered. Discharge instructions have also been provided to the patient with some educational information regarding their diagnosis as well a list of reasons why they would want to return to the ER prior to their follow-up appointment should their condition change. PLAN:  1. Discharge Medication List as of 8/22/2021  9:33 PM      START taking these medications    Details   hydroCHLOROthiazide (HYDRODIURIL) 25 mg tablet Take 0.5 Tablets by mouth daily for 30 days. , Normal, Disp-15 Tablet, R-0      !! butalbital-acetaminophen-caff (Fioricet) -40 mg per capsule Take 1 Capsule by mouth every four (4) hours as needed for Headache., Normal, Disp-20 Capsule, R-0       !! - Potential duplicate medications found. Please discuss with provider. CONTINUE these medications which have NOT CHANGED    Details   !! butalbital-acetaminophen-caff (FIORICET) -40 mg per capsule Take 1 Cap by mouth every four (4) hours as needed for Headache., Print, Disp-20 Cap, R-0      sertraline (ZOLOFT) 50 mg tablet Take 1 Tab by mouth daily. Indications: Anxiousness associated with Depression, Print, Disp-30 Tab, R-0      prenatal multivit-ca-min-fe-fa (PRENATAL VITAMIN) tab Take 1 Tab by mouth daily. , Print, Disp-30 Tab, R-0       !! - Potential duplicate medications found. Please discuss with provider. 2.   Follow-up Information     Follow up With Specialties Details Why [address] [facility] [place] EMERGENCY DEPT Emergency Medicine  If symptoms worsen [address]    Janette Anderson MD Neurology Schedule an appointment as soon as possible for a visit   [address]  [address]  [phone]            Return to ED if worse     Diagnosis     Clinical Impression:   1. Nonintractable headache, unspecified chronicity pattern, unspecified headache type    2. Elevated blood pressure reading        Attestations:   This note was completed by Barber Campbell, DO

## 2021-10-01 ENCOUNTER — HOSPITAL ENCOUNTER (EMERGENCY)
Age: 26
Discharge: HOME | End: 2021-10-01
Attending: EMERGENCY MEDICINE
Payer: COMMERCIAL

## 2021-10-01 VITALS
RESPIRATION RATE: 20 BRPM | HEART RATE: 81 BPM | TEMPERATURE: 98.4 F | WEIGHT: 173 LBS | OXYGEN SATURATION: 99 % | BODY MASS INDEX: 32.66 KG/M2 | HEIGHT: 61 IN | DIASTOLIC BLOOD PRESSURE: 97 MMHG | SYSTOLIC BLOOD PRESSURE: 144 MMHG

## 2021-10-01 DIAGNOSIS — R10.2 VAGINAL PAIN: ICD-10-CM

## 2021-10-01 DIAGNOSIS — K64.9 HEMORRHOIDS, UNSPECIFIED HEMORRHOID TYPE: ICD-10-CM

## 2021-10-01 DIAGNOSIS — B96.89 BV (BACTERIAL VAGINOSIS): Primary | ICD-10-CM

## 2021-10-01 DIAGNOSIS — N76.0 BV (BACTERIAL VAGINOSIS): Primary | ICD-10-CM

## 2021-10-01 LAB
APPEARANCE UR: CLEAR
BACTERIA URNS QL MICRO: NEGATIVE /HPF
BILIRUB UR QL: NEGATIVE
CLUE CELLS VAG QL WET PREP: NORMAL
COLOR UR: NORMAL
EPITH CASTS URNS QL MICRO: NORMAL /LPF
GLUCOSE UR STRIP.AUTO-MCNC: NEGATIVE MG/DL
HCG UR QL: NEGATIVE
HGB UR QL STRIP: NEGATIVE
KETONES UR QL STRIP.AUTO: NEGATIVE MG/DL
KOH PREP SPEC: NORMAL
LEUKOCYTE ESTERASE UR QL STRIP.AUTO: NEGATIVE
NITRITE UR QL STRIP.AUTO: NEGATIVE
PH UR STRIP: 6.5 [PH] (ref 5–8)
PROT UR STRIP-MCNC: NEGATIVE MG/DL
RBC #/AREA URNS HPF: NORMAL /HPF (ref 0–5)
SERVICE CMNT-IMP: NORMAL
SP GR UR REFRACTOMETRY: 1.02 (ref 1–1.03)
T VAGINALIS VAG QL WET PREP: NORMAL
UA: UC IF INDICATED,UAUC: NORMAL
UROBILINOGEN UR QL STRIP.AUTO: 0.2 EU/DL (ref 0.2–1)
WBC URNS QL MICRO: NORMAL /HPF (ref 0–4)

## 2021-10-01 PROCEDURE — 87210 SMEAR WET MOUNT SALINE/INK: CPT

## 2021-10-01 PROCEDURE — 87255 GENET VIRUS ISOLATE HSV: CPT

## 2021-10-01 PROCEDURE — 81025 URINE PREGNANCY TEST: CPT

## 2021-10-01 PROCEDURE — 99283 EMERGENCY DEPT VISIT LOW MDM: CPT

## 2021-10-01 PROCEDURE — 81001 URINALYSIS AUTO W/SCOPE: CPT

## 2021-10-01 PROCEDURE — 87491 CHLMYD TRACH DNA AMP PROBE: CPT

## 2021-10-01 RX ORDER — METRONIDAZOLE 7.5 MG/G
1 GEL VAGINAL
Qty: 25 G | Refills: 0 | Status: SHIPPED | OUTPATIENT
Start: 2021-10-01 | End: 2021-10-06

## 2021-10-01 RX ORDER — METRONIDAZOLE 500 MG/1
500 TABLET ORAL 2 TIMES DAILY
Qty: 14 TABLET | Refills: 0 | Status: SHIPPED | OUTPATIENT
Start: 2021-10-01 | End: 2021-10-01 | Stop reason: ALTCHOICE

## 2021-10-01 RX ORDER — HYDROCORTISONE 1 %
CREAM (GRAM) TOPICAL 2 TIMES DAILY
Qty: 30 G | Refills: 0 | Status: SHIPPED | OUTPATIENT
Start: 2021-10-01

## 2021-10-02 NOTE — ED PROVIDER NOTES
EMERGENCY DEPARTMENT HISTORY AND PHYSICAL EXAM    Date: 10/1/2021  Patient Name: Perla Aguilar    History of Presenting Illness     Chief Complaint   Patient presents with    Vaginal Pain         History Provided By: Patient    HPI: Perla Aguilar is a 23 y.o. female with a PMH of HTN who presents with vaginal pain. She reports burning sensation to vaginal region and she states it feels like bumps are going to arise. Denies hx of HSV or syphilis. States she was sexually active with an old partner and she is concerned. Reports vaginal d/c but no itching and odor. Denies abd pain, nausea, vomiting, fever, chills. She has not tried anything for her sx. She also c/o hemorrhoids that she contributes to straining. She reports constipation but no diarrhea. She has not tried anything for his sx. PCP: None    Current Outpatient Medications   Medication Sig Dispense Refill    metroNIDAZOLE (METROGEL) 0.75 % gel Insert 5 g into vagina nightly for 5 days. 25 g 0    hydrocortisone (Preparation H Hydrocortisone) 1 % topical cream Apply  to affected area two (2) times a day. use thin layer 30 g 0    butalbital-acetaminophen-caff (Fioricet) -40 mg per capsule Take 1 Capsule by mouth every four (4) hours as needed for Headache. 20 Capsule 0    butalbital-acetaminophen-caff (FIORICET) -40 mg per capsule Take 1 Cap by mouth every four (4) hours as needed for Headache. (Patient not taking: Reported on 7/23/2021) 20 Cap 0    sertraline (ZOLOFT) 50 mg tablet Take 1 Tab by mouth daily. Indications: Anxiousness associated with Depression (Patient not taking: Reported on 7/23/2021) 30 Tab 0    prenatal multivit-ca-min-fe-fa (PRENATAL VITAMIN) tab Take 1 Tab by mouth daily.  (Patient not taking: Reported on 7/23/2021) 30 Tab 0       Past History     Past Medical History:  Past Medical History:   Diagnosis Date    Anemia during pregnancy in second trimester 10/1/2019    Chlamydia     Hypertension     Murmur 10/1/2019 Past Surgical History:  No past surgical history on file. Family History:  History reviewed. No pertinent family history. Social History:  Social History     Tobacco Use    Smoking status: Light Tobacco Smoker     Packs/day: 0.25    Smokeless tobacco: Never Used   Substance Use Topics    Alcohol use: Not Currently    Drug use: No       Allergies:  No Known Allergies      Review of Systems   Review of Systems   Constitutional: Negative for chills and fever. Respiratory: Negative for cough and shortness of breath. Gastrointestinal: Positive for rectal pain. Negative for abdominal pain, anal bleeding, blood in stool, constipation, diarrhea, nausea and vomiting. Genitourinary: Positive for vaginal discharge and vaginal pain. Negative for dysuria, frequency, genital sores, hematuria, menstrual problem, pelvic pain, urgency and vaginal bleeding. Skin: Negative for rash. Neurological: Negative for dizziness and headaches. All other systems reviewed and are negative. Physical Exam     Vitals:    10/01/21 2222 10/01/21 2224   BP: (!) 137/106 (!) 144/97   Pulse: 81    Resp: 20    Temp: 98.4 °F (36.9 °C)    SpO2: 99%    Weight: 78.5 kg (173 lb)    Height: 5' 1\" (1.549 m)      Physical Exam  Vitals and nursing note reviewed. Exam conducted with a chaperone present Soraida Mccartney  tech). Constitutional:       General: She is not in acute distress. Appearance: She is well-developed. She is not ill-appearing. HENT:      Head: Normocephalic and atraumatic. Right Ear: Tympanic membrane and ear canal normal.      Left Ear: Tympanic membrane and ear canal normal.      Nose: Nose normal.      Mouth/Throat:      Mouth: Mucous membranes are moist.      Pharynx: Oropharynx is clear. No oropharyngeal exudate or posterior oropharyngeal erythema. Eyes:      Extraocular Movements: Extraocular movements intact. Pupils: Pupils are equal, round, and reactive to light.    Cardiovascular:      Rate and Rhythm: Normal rate and regular rhythm. Pulses: Normal pulses. Heart sounds: Normal heart sounds. Pulmonary:      Effort: Pulmonary effort is normal.      Breath sounds: Normal breath sounds. Abdominal:      General: Abdomen is flat. Bowel sounds are normal.      Palpations: Abdomen is soft. Tenderness: There is no abdominal tenderness. There is no guarding or rebound. Genitourinary:     Exam position: Knee-chest position. Labia:         Right: Rash and tenderness present. No lesion or injury. Vagina: Vaginal discharge present. Cervix: Discharge present. No cervical motion tenderness or cervical bleeding. Uterus: Normal.       Adnexa: Right adnexa normal and left adnexa normal.      Rectum: External hemorrhoid present. Musculoskeletal:         General: Normal range of motion. Cervical back: Normal range of motion and neck supple. Skin:     General: Skin is warm and dry. Neurological:      Mental Status: She is alert and oriented to person, place, and time. Diagnostic Study Results     Labs -   No results found for this or any previous visit (from the past 12 hour(s)). Radiologic Studies -   No orders to display     CT Results  (Last 48 hours)    None        CXR Results  (Last 48 hours)    None            Medical Decision Making   I am the first provider for this patient. I reviewed the vital signs, available nursing notes, past medical history, past surgical history, family history and social history. Vital Signs-Reviewed the patient's vital signs. Records Reviewed: Nursing Notes and Old Medical Records    Provider Notes (Medical Decision Making):   24 yo F with c/o vaginal pain exhibiting no lesions but discharge with odor present on exam. + clue cells. Denies empiric treatment at this time. Plan to treat with flagyl BID x 7 days.     DDX: Chlamydia, Gonorrhea, BV, Candidiasis, Trichomonas, UTI, HSV, syphilis, hemorrhoids Disposition:  Discharge   DISCHARGE NOTE:         Care plan outlined and precautions discussed. Patient has no new complaints, changes, or physical findings. All of pt's questions and concerns were addressed. Patient was instructed and agrees to follow up with PCP, as well as to return to the ED upon further deterioration. Patient is ready to go home. Follow-up Information    None         Discharge Medication List as of 10/1/2021 11:45 PM          Procedures:  Procedures    Please note that this dictation was completed with Dragon, computer voice recognition software. Quite often unanticipated grammatical, syntax, homophones, and other interpretive errors are inadvertently transcribed by the computer software. Please disregard these errors. Additionally, please excuse any errors that have escaped final proofreading. Diagnosis     Clinical Impression:   1. BV (bacterial vaginosis)    2. Vaginal pain    3.  Hemorrhoids, unspecified hemorrhoid type

## 2021-10-02 NOTE — ED TRIAGE NOTES
Pt comes in with c/o vaginal pain with painful lesion x 2 days. Pt denies vaginal discharge. Pt also c/o hemorrhoids.

## 2021-10-02 NOTE — ED NOTES
Discharge instructions were given to the patient by Pia Jackson RN. The patient left the Emergency Department ambulatory, alert and oriented and in no acute distress with 2 prescriptions. The patient was encouraged to call or return to the ED for worsening issues or problems and was encouraged to schedule a follow up appointment for continuing care. The patient verbalized understanding of discharge instructions and prescriptions, all questions were answered. The patient has no further concerns at this time.

## 2021-10-02 NOTE — DISCHARGE INSTRUCTIONS
It was a pleasure taking care of you at [facility] Emergency Department today. We know that when you come to [facility], you are entrusting us with your health, comfort, and safety. Our physicians and nurses honor that trust, and we truly appreciate the opportunity to care for you and your loved ones. We also value our feedback. If you receive a survey about your Emergency Department experience today, please fill it out. We care about our patients' feedback, and we listen to what you have to say. Thank you!

## 2021-10-02 NOTE — ED NOTES
Emergency Department Nursing Plan of Care       The Nursing Plan of Care is developed from the Nursing assessment and Emergency Department Attending provider initial evaluation. The plan of care may be reviewed in the ED Provider note.     The Plan of Care was developed with the following considerations:   Patient / Family readiness to learn indicated by:verbalized understanding  Persons(s) to be included in education: patient  Barriers to Learning/Limitations:No    Signed     Dot Silva RN    10/1/2021   10:39 PM

## 2021-10-04 LAB
C TRACH RRNA SPEC QL NAA+PROBE: NEGATIVE
N GONORRHOEA RRNA SPEC QL NAA+PROBE: NEGATIVE
SPECIMEN SOURCE: NORMAL

## 2021-10-05 LAB
HSV SPEC CULT: NORMAL
SPECIMEN SOURCE: NORMAL

## 2022-01-24 ENCOUNTER — APPOINTMENT (OUTPATIENT)
Dept: CT IMAGING | Age: 27
End: 2022-01-24
Attending: NURSE PRACTITIONER
Payer: COMMERCIAL

## 2022-01-24 ENCOUNTER — HOSPITAL ENCOUNTER (EMERGENCY)
Age: 27
Discharge: HOME | End: 2022-01-24
Attending: EMERGENCY MEDICINE
Payer: COMMERCIAL

## 2022-01-24 VITALS
HEART RATE: 84 BPM | SYSTOLIC BLOOD PRESSURE: 130 MMHG | OXYGEN SATURATION: 98 % | DIASTOLIC BLOOD PRESSURE: 77 MMHG | RESPIRATION RATE: 16 BRPM | TEMPERATURE: 98 F
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
## 2022-01-24 LAB
ANION GAP SERPL CALC-SCNC: 9 MMOL/L (ref 5–15)
BASOPHILS # BLD: 0 K/UL (ref 0–0.1)
BASOPHILS NFR BLD: 0 % (ref 0–1)
BUN SERPL-MCNC: 7 MG/DL (ref 6–20)
BUN/CREAT SERPL: 11 (ref 12–20)
CALCIUM SERPL-MCNC: 10 MG/DL (ref 8.5–10.1)
CHLORIDE SERPL-SCNC: 103 MMOL/L (ref 97–108)
CLUE CELLS VAG QL WET PREP: NORMAL
CO2 SERPL-SCNC: 23 MMOL/L (ref 21–32)
CREAT SERPL-MCNC: 0.64 MG/DL (ref 0.55–1.02)
DIFFERENTIAL METHOD BLD: NORMAL
EOSINOPHIL # BLD: 0.1 K/UL (ref 0–0.4)
EOSINOPHIL NFR BLD: 1 % (ref 0–7)
ERYTHROCYTE [DISTWIDTH] IN BLOOD BY AUTOMATED COUNT: 12.2 % (ref 11.5–14.5)
GLUCOSE SERPL-MCNC: 89 MG/DL (ref 65–100)
HBV SURFACE AB SER QL: NONREACTIVE
HBV SURFACE AB SER-ACNC: 3.84 MIU/ML
HCG SERPL QL: NEGATIVE
HCT VFR BLD AUTO: 41.7 % (ref 35–47)
HGB BLD-MCNC: 13.4 G/DL (ref 11.5–16)
HIV1 P24 AG SERPL QL IA: NONREACTIVE
HIV1+2 AB SERPL QL IA: NONREACTIVE
IMM GRANULOCYTES # BLD AUTO: 0 K/UL (ref 0–0.04)
IMM GRANULOCYTES NFR BLD AUTO: 0 % (ref 0–0.5)
KOH PREP SPEC: NORMAL
LYMPHOCYTES # BLD: 2.7 K/UL (ref 0.8–3.5)
LYMPHOCYTES NFR BLD: 41 % (ref 12–49)
MCH RBC QN AUTO: 29.1 PG (ref 26–34)
MCHC RBC AUTO-ENTMCNC: 32.1 G/DL (ref 30–36.5)
MCV RBC AUTO: 90.5 FL (ref 80–99)
MONOCYTES # BLD: 0.5 K/UL (ref 0–1)
MONOCYTES NFR BLD: 7 % (ref 5–13)
NEUTS SEG # BLD: 3.5 K/UL (ref 1.8–8)
NEUTS SEG NFR BLD: 51 % (ref 32–75)
NRBC # BLD: 0 K/UL (ref 0–0.01)
NRBC BLD-RTO: 0 PER 100 WBC
PLATELET # BLD AUTO: 274 K/UL (ref 150–400)
PMV BLD AUTO: 10.9 FL (ref 8.9–12.9)
POTASSIUM SERPL-SCNC: 3.2 MMOL/L (ref 3.5–5.1)
RBC # BLD AUTO: 4.61 M/UL (ref 3.8–5.2)
SERVICE CMNT-IMP: NORMAL
SODIUM SERPL-SCNC: 135 MMOL/L (ref 136–145)
T VAGINALIS VAG QL WET PREP: NORMAL
WBC # BLD AUTO: 6.8 K/UL (ref 3.6–11)

## 2022-01-24 PROCEDURE — 74011250636 HC RX REV CODE- 250/636: Performed by: NURSE PRACTITIONER

## 2022-01-24 PROCEDURE — 86592 SYPHILIS TEST NON-TREP QUAL: CPT

## 2022-01-24 PROCEDURE — 96372 THER/PROPH/DIAG INJ SC/IM: CPT

## 2022-01-24 PROCEDURE — 86706 HEP B SURFACE ANTIBODY: CPT

## 2022-01-24 PROCEDURE — 86704 HEP B CORE ANTIBODY TOTAL: CPT

## 2022-01-24 PROCEDURE — 87210 SMEAR WET MOUNT SALINE/INK: CPT

## 2022-01-24 PROCEDURE — 70450 CT HEAD/BRAIN W/O DYE: CPT

## 2022-01-24 PROCEDURE — 86803 HEPATITIS C AB TEST: CPT

## 2022-01-24 PROCEDURE — 74011000636 HC RX REV CODE- 636: Performed by: EMERGENCY MEDICINE

## 2022-01-24 PROCEDURE — 87389 HIV-1 AG W/HIV-1&-2 AB AG IA: CPT

## 2022-01-24 PROCEDURE — 74011250637 HC RX REV CODE- 250/637: Performed by: NURSE PRACTITIONER

## 2022-01-24 PROCEDURE — 99284 EMERGENCY DEPT VISIT MOD MDM: CPT

## 2022-01-24 PROCEDURE — 80048 BASIC METABOLIC PNL TOTAL CA: CPT

## 2022-01-24 PROCEDURE — 85025 COMPLETE CBC W/AUTO DIFF WBC: CPT

## 2022-01-24 PROCEDURE — 87491 CHLMYD TRACH DNA AMP PROBE: CPT

## 2022-01-24 PROCEDURE — 84703 CHORIONIC GONADOTROPIN ASSAY: CPT

## 2022-01-24 PROCEDURE — 75810000275 HC EMERGENCY DEPT VISIT NO LEVEL OF CARE

## 2022-01-24 PROCEDURE — 70498 CT ANGIOGRAPHY NECK: CPT

## 2022-01-24 PROCEDURE — 74011000250 HC RX REV CODE- 250: Performed by: NURSE PRACTITIONER

## 2022-01-24 RX ORDER — EMTRICITABINE AND TENOFOVIR DISOPROXIL FUMARATE 200; 300 MG/1; MG/1
1 TABLET, FILM COATED ORAL
Status: COMPLETED | OUTPATIENT
Start: 2022-01-24 | End: 2022-01-24

## 2022-01-24 RX ORDER — ONDANSETRON 4 MG/1
4 TABLET, FILM COATED ORAL
Qty: 20 TABLET | Refills: 0 | Status: SHIPPED | OUTPATIENT
Start: 2022-01-24 | End: 2022-01-24 | Stop reason: SDUPTHER

## 2022-01-24 RX ORDER — ONDANSETRON 4 MG/1
4 TABLET, FILM COATED ORAL
Qty: 20 TABLET | Refills: 0 | Status: SHIPPED | OUTPATIENT
Start: 2022-01-24

## 2022-01-24 RX ORDER — ONDANSETRON 2 MG/ML
4 INJECTION INTRAMUSCULAR; INTRAVENOUS
Status: DISCONTINUED | OUTPATIENT
Start: 2022-01-24 | End: 2022-01-24

## 2022-01-24 RX ORDER — ONDANSETRON 4 MG/1
4 TABLET, ORALLY DISINTEGRATING ORAL
Status: COMPLETED | OUTPATIENT
Start: 2022-01-24 | End: 2022-01-24

## 2022-01-24 RX ORDER — LEVONORGESTREL 1.5 MG/1
1.5 TABLET ORAL ONCE
Status: COMPLETED | OUTPATIENT
Start: 2022-01-24 | End: 2022-01-24

## 2022-01-24 RX ORDER — AZITHROMYCIN 250 MG/1
1000 TABLET, FILM COATED ORAL ONCE
Status: COMPLETED | OUTPATIENT
Start: 2022-01-24 | End: 2022-01-24

## 2022-01-24 RX ORDER — EMTRICITABINE AND TENOFOVIR DISOPROXIL FUMARATE 200; 300 MG/1; MG/1
1 TABLET, FILM COATED ORAL DAILY
Qty: 27 TABLET | Refills: 0 | Status: SHIPPED | OUTPATIENT
Start: 2022-01-24 | End: 2022-02-20

## 2022-01-24 RX ORDER — EMTRICITABINE AND TENOFOVIR DISOPROXIL FUMARATE 200; 300 MG/1; MG/1
1 TABLET, FILM COATED ORAL DAILY
Qty: 27 TABLET | Refills: 0 | Status: SHIPPED | OUTPATIENT
Start: 2022-01-24 | End: 2022-01-24 | Stop reason: SDUPTHER

## 2022-01-24 RX ADMIN — LEVONORGESTREL 1.5 MG: 1.5 TABLET ORAL at 22:42

## 2022-01-24 RX ADMIN — IOPAMIDOL 100 ML: 755 INJECTION, SOLUTION INTRAVENOUS at 21:50

## 2022-01-24 RX ADMIN — LIDOCAINE HYDROCHLORIDE 500 MG: 10 INJECTION, SOLUTION EPIDURAL; INFILTRATION; INTRACAUDAL; PERINEURAL at 22:42

## 2022-01-24 RX ADMIN — EMTRICITABINE AND TENOFOVIR DISOPROXIL FUMARATE 1 TABLET: 200; 300 TABLET, FILM COATED ORAL at 22:42

## 2022-01-24 RX ADMIN — ONDANSETRON 4 MG: 4 TABLET, ORALLY DISINTEGRATING ORAL at 22:41

## 2022-01-24 RX ADMIN — AZITHROMYCIN DIHYDRATE 1000 MG: 250 TABLET, FILM COATED ORAL at 22:41

## 2022-01-24 RX ADMIN — RALTEGRAVIR 400 MG: 400 TABLET, FILM COATED ORAL at 22:42

## 2022-01-24 NOTE — Clinical Note
[address]  [address] [facility] [address]  [phone]    Work/School Note    Date: 1/24/2022    To Whom It May concern:    Wallace Butler was seen and treated today in the emergency room by the following provider(s):  Attending Provider: Patricia Corea MD  Nurse Practitioner: Srinath Conte NP.      Wallace Butler is excused from work/school on 01/24/22 and 01/25/22. She is medically clear to return to work/school on 1/26/2022.        Sincerely,          Kalpana Horowitz NP

## 2022-01-25 LAB
HCV AB SERPL QL IA: NONREACTIVE
RPR SER QL: NONREACTIVE
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
## 2022-01-26 LAB — HBV CORE AB SERPL QL IA: NEGATIVE

## 2022-03-19 PROBLEM — O99.012 ANEMIA DURING PREGNANCY IN SECOND TRIMESTER: Status: ACTIVE | Noted: 2019-10-01

## 2022-03-19 PROBLEM — Z34.90 PREGNANCY: Status: ACTIVE | Noted: 2019-10-01

## 2022-03-20 PROBLEM — F32.9 MAJOR DEPRESSION: Status: ACTIVE | Noted: 2019-09-30

## 2022-03-20 PROBLEM — R01.1 MURMUR: Status: ACTIVE | Noted: 2019-10-01

## 2022-06-27 ENCOUNTER — HOSPITAL ENCOUNTER (EMERGENCY)
Age: 27
Discharge: HOME | End: 2022-06-27
Attending: EMERGENCY MEDICINE
Payer: COMMERCIAL

## 2022-06-27 VITALS
BODY MASS INDEX: 33.25 KG/M2 | HEART RATE: 88 BPM | TEMPERATURE: 98.6 F | OXYGEN SATURATION: 98 % | DIASTOLIC BLOOD PRESSURE: 69 MMHG | SYSTOLIC BLOOD PRESSURE: 115 MMHG | WEIGHT: 176 LBS | RESPIRATION RATE: 18 BRPM

## 2022-06-27 DIAGNOSIS — B37.31 YEAST VAGINITIS: ICD-10-CM

## 2022-06-27 DIAGNOSIS — N30.00 ACUTE CYSTITIS WITHOUT HEMATURIA: Primary | ICD-10-CM

## 2022-06-27 LAB
APPEARANCE UR: ABNORMAL
BACTERIA URNS QL MICRO: ABNORMAL /HPF
BILIRUB UR QL: NEGATIVE
CLUE CELLS VAG QL WET PREP: NORMAL
COLOR UR: ABNORMAL
EPITH CASTS URNS QL MICRO: ABNORMAL /LPF
GLUCOSE UR STRIP.AUTO-MCNC: NEGATIVE MG/DL
HGB UR QL STRIP: NEGATIVE
KETONES UR QL STRIP.AUTO: NEGATIVE MG/DL
KOH PREP SPEC: NORMAL
LEUKOCYTE ESTERASE UR QL STRIP.AUTO: ABNORMAL
NITRITE UR QL STRIP.AUTO: NEGATIVE
PH UR STRIP: 6 [PH] (ref 5–8)
PROT UR STRIP-MCNC: ABNORMAL MG/DL
RBC #/AREA URNS HPF: ABNORMAL /HPF (ref 0–5)
SERVICE CMNT-IMP: NORMAL
SP GR UR REFRACTOMETRY: 1.02
T VAGINALIS VAG QL WET PREP: NORMAL
UA: UC IF INDICATED,UAUC: ABNORMAL
UROBILINOGEN UR QL STRIP.AUTO: 1 EU/DL (ref 0.2–1)
WBC URNS QL MICRO: ABNORMAL /HPF (ref 0–4)

## 2022-06-27 PROCEDURE — 87210 SMEAR WET MOUNT SALINE/INK: CPT

## 2022-06-27 PROCEDURE — 99283 EMERGENCY DEPT VISIT LOW MDM: CPT

## 2022-06-27 PROCEDURE — 81001 URINALYSIS AUTO W/SCOPE: CPT

## 2022-06-27 PROCEDURE — 87086 URINE CULTURE/COLONY COUNT: CPT

## 2022-06-27 RX ORDER — CEPHALEXIN 500 MG/1
500 CAPSULE ORAL 2 TIMES DAILY
Qty: 14 CAPSULE | Refills: 0 | Status: SHIPPED | OUTPATIENT
Start: 2022-06-27 | End: 2022-07-04

## 2022-06-27 RX ORDER — ONDANSETRON 4 MG/1
4 TABLET, ORALLY DISINTEGRATING ORAL
Qty: 12 TABLET | Refills: 0 | Status: SHIPPED | OUTPATIENT
Start: 2022-06-27

## 2022-06-27 RX ORDER — ASPIRIN 325 MG
1 TABLET, DELAYED RELEASE (ENTERIC COATED) ORAL
Qty: 7 APPLICATOR | Refills: 0 | Status: SHIPPED | OUTPATIENT
Start: 2022-06-27 | End: 2022-07-04

## 2022-06-27 NOTE — ED PROVIDER NOTES
EMERGENCY DEPARTMENT HISTORY AND PHYSICAL EXAM      Date: 6/27/2022  Patient Name: Stephanie Nascimento    Please note that this dictation was completed with [employer], the computer voice recognition software. Quite often unanticipated grammatical, syntax, homophones, and other interpretive errors are inadvertently transcribed by the computer software. Please disregard these errors. Please excuse any errors that have escaped final proofreading. History of Presenting Illness     Chief Complaint   Patient presents with    Urinary Frequency       History Provided By: Patient     HPI: Stephanie Nascimento, 33 y.o. female, at 22 weeks gestation presenting the emergency department complaining of dysuria, vaginal discharge. She reports a abnormal smell to the discharge, no bleeding, no watery discharge. No abdominal pain. Dysuria has been going on for almost a month and she was seen at [facility] and diagnosed with a urinary tract infection. She has not picked up any antibiotics. She denies any fever. Denies any flank pain. No other exacerbating relieving factors or associated symptoms    PCP: None    No current facility-administered medications on file prior to encounter. Current Outpatient Medications on File Prior to Encounter   Medication Sig Dispense Refill    ondansetron hcl (Zofran) 4 mg tablet Take 1 Tablet by mouth every twelve (12) hours as needed for Nausea or Vomiting. 20 Tablet 0    hydrocortisone (Preparation H Hydrocortisone) 1 % topical cream Apply  to affected area two (2) times a day. use thin layer 30 g 0    butalbital-acetaminophen-caff (Fioricet) -40 mg per capsule Take 1 Capsule by mouth every four (4) hours as needed for Headache. 20 Capsule 0    butalbital-acetaminophen-caff (FIORICET) -40 mg per capsule Take 1 Cap by mouth every four (4) hours as needed for Headache. (Patient not taking: Reported on 7/23/2021) 20 Cap 0    sertraline (ZOLOFT) 50 mg tablet Take 1 Tab by mouth daily. Indications: Anxiousness associated with Depression (Patient not taking: Reported on 7/23/2021) 30 Tab 0    prenatal multivit-ca-min-fe-fa (PRENATAL VITAMIN) tab Take 1 Tab by mouth daily. (Patient not taking: Reported on 7/23/2021) 30 Tab 0       Past History     Past Medical History:  Past Medical History:   Diagnosis Date    Anemia during pregnancy in second trimester 10/1/2019    Chlamydia     Hypertension     Murmur 10/1/2019       Past Surgical History:  No past surgical history on file. Family History:  History reviewed. No pertinent family history. Social History:  Social History     Tobacco Use    Smoking status: Light Tobacco Smoker     Packs/day: 0.25    Smokeless tobacco: Never Used   Substance Use Topics    Alcohol use: Not Currently    Drug use: No       Allergies:  No Known Allergies      Review of Systems   Review of Systems   Constitutional: Negative for chills and fever. HENT: Negative for congestion and sore throat. Eyes: Negative for visual disturbance. Respiratory: Negative for cough and shortness of breath. Cardiovascular: Negative for chest pain and leg swelling. Gastrointestinal: Negative for abdominal pain, blood in stool, diarrhea and nausea. Endocrine: Negative for polyuria. Genitourinary: Positive for dysuria and vaginal discharge. Negative for flank pain and vaginal bleeding. Musculoskeletal: Negative for myalgias. Skin: Negative for rash. Allergic/Immunologic: Negative for immunocompromised state. Neurological: Negative for weakness and headaches. Psychiatric/Behavioral: Negative for confusion. Physical Exam   Physical Exam  Vitals and nursing note reviewed. Constitutional:       Appearance: She is well-developed. HENT:      Head: Normocephalic and atraumatic. Eyes:      General:         Right eye: No discharge. Left eye: No discharge.       Conjunctiva/sclera: Conjunctivae normal.      Pupils: Pupils are equal, round, and reactive to light. Neck:      Trachea: No tracheal deviation. Cardiovascular:      Rate and Rhythm: Normal rate and regular rhythm. Heart sounds: Normal heart sounds. No murmur heard. Pulmonary:      Effort: Pulmonary effort is normal. No respiratory distress. Breath sounds: Normal breath sounds. No wheezing or rales. Abdominal:      General: Bowel sounds are normal.      Palpations: Abdomen is soft. Tenderness: There is no abdominal tenderness. There is no guarding or rebound. Comments: Gravid abdomen   Musculoskeletal:         General: No tenderness or deformity. Normal range of motion. Cervical back: Normal range of motion and neck supple. Skin:     General: Skin is warm and dry. Findings: No erythema or rash. Neurological:      Mental Status: She is alert and oriented to person, place, and time. Psychiatric:         Behavior: Behavior normal.         Diagnostic Study Results     Labs -     Recent Results (from the past 12 hour(s))   URINALYSIS W/ REFLEX CULTURE    Collection Time: 06/27/22  2:05 AM    Specimen: Urine   Result Value Ref Range    Color YELLOW/STRAW      Appearance CLOUDY (A) CLEAR      Specific gravity 1.025      pH (UA) 6.0 5.0 - 8.0      Protein TRACE (A) NEG mg/dL    Glucose Negative NEG mg/dL    Ketone Negative NEG mg/dL    Bilirubin Negative NEG      Blood Negative NEG      Urobilinogen 1.0 0.2 - 1.0 EU/dL    Nitrites Negative NEG      Leukocyte Esterase MODERATE (A) NEG      WBC 20-50 0 - 4 /hpf    RBC 0-5 0 - 5 /hpf    Epithelial cells MODERATE (A) FEW /lpf    Bacteria 3+ (A) NEG /hpf    UA:UC IF INDICATED URINE CULTURE ORDERED (A) CNI     WET PREP    Collection Time: 06/27/22  2:05 AM    Specimen: Miscellaneous sample   Result Value Ref Range    Clue cells CLUE CELLS ABSENT      Wet prep NO TRICHOMONAS SEEN     KOH, OTHER SOURCES    Collection Time: 06/27/22  2:05 AM    Specimen: Vagina;  Other   Result Value Ref Range    Special Requests: NO SPECIAL REQUESTS      ERNESTO YEAST WITH PSEUDOHYPHAE         Radiologic Studies -   No orders to display     CT Results  (Last 48 hours)    None        CXR Results  (Last 48 hours)    None            Medical Decision Making   I am the first provider for this patient. I reviewed the vital signs, available nursing notes, past medical history, past surgical history, family history and social history. Vital Signs-Reviewed the patient's vital signs. Patient Vitals for the past 12 hrs:   Temp Pulse Resp BP SpO2   06/27/22 0115 98.6 °F (37 °C) 88 18 115/69 98 %           Records Reviewed:   Nursing notes, Prior visits     Provider Notes (Medical Decision Making):   No concern for labor, patient's urine does appear infected. Will start on antibiotics. KOH and wet prep showed yeast.  Will start on topical treatment for yeast.  Patient needs to follow-up closely with her OB/GYN. She is requesting an ultrasound, but is not having any obstetrical complaints or concerns at this time. Patient can follow-up with OB/GYN. ED Course:   Initial assessment performed. The patients presenting problems have been discussed, and they are in agreement with the care plan formulated and outlined with them. I have encouraged them to ask questions as they arise throughout their visit. Critical Care Time:   none    Disposition:    DISCHARGE NOTE  Patients results have been reviewed with them. Patient and/or family have verbally conveyed their understanding and agreement of the patient's signs, symptoms, diagnosis, treatment and prognosis and additionally agree to follow up as recommended or return to the Emergency Room should their condition change or have any new concerns prior to their follow-up appointment. Patient verbally agrees with the care-plan and verbally conveys that all of their questions have been answered.    Discharge instructions have also been provided to the patient with some educational information regarding their diagnosis as well a list of reasons why they would want to return to the ER prior to their follow-up appointment should their condition change. PLAN:  1. Discharge Medication List as of 6/27/2022  2:39 AM      START taking these medications    Details   clotrimazole (MYCELEX) 1 % vaginal cream Insert 1 Applicator into vagina nightly for 7 days. , Normal, Disp-7 Applicator, R-0      cephALEXin (Keflex) 500 mg capsule Take 1 Capsule by mouth two (2) times a day for 7 days. , Normal, Disp-14 Capsule, R-0      ondansetron (ZOFRAN ODT) 4 mg disintegrating tablet Take 1 Tablet by mouth every eight (8) hours as needed for Nausea or Vomiting., Normal, Disp-12 Tablet, R-0         CONTINUE these medications which have NOT CHANGED    Details   ondansetron hcl (Zofran) 4 mg tablet Take 1 Tablet by mouth every twelve (12) hours as needed for Nausea or Vomiting., Print, Disp-20 Tablet, R-0      hydrocortisone (Preparation H Hydrocortisone) 1 % topical cream Apply  to affected area two (2) times a day. use thin layer, Normal, Disp-30 g, R-0      !! butalbital-acetaminophen-caff (Fioricet) -40 mg per capsule Take 1 Capsule by mouth every four (4) hours as needed for Headache., Normal, Disp-20 Capsule, R-0      !! butalbital-acetaminophen-caff (FIORICET) -40 mg per capsule Take 1 Cap by mouth every four (4) hours as needed for Headache., Print, Disp-20 Cap, R-0      sertraline (ZOLOFT) 50 mg tablet Take 1 Tab by mouth daily. Indications: Anxiousness associated with Depression, Print, Disp-30 Tab, R-0      prenatal multivit-ca-min-fe-fa (PRENATAL VITAMIN) tab Take 1 Tab by mouth daily. , Print, Disp-30 Tab, R-0       !! - Potential duplicate medications found. Please discuss with provider.         2.   Follow-up Information     Follow up With Specialties Details Why Contact Info    Your Ob/GYN  Schedule an appointment as soon as possible for a visit             Return to ED if worse     Diagnosis Clinical Impression:   1. Acute cystitis without hematuria    2. Yeast vaginitis        Attestations:   This note was completed by Raphael Jeffrey DO

## 2022-06-27 NOTE — ED NOTES
Pt is 21wks gestation reporting urinary frequency, malodorous urine, and yellow vaginal discharge. Seen approx 2wks PTA at [facility] and dx with UTI, but LWBS. Warm blanket offered, call bell within reach, safety precautions in place, bed locked and in the lowest position. Emergency Department Nursing Plan of Care       The Nursing Plan of Care is developed from the Nursing assessment and Emergency Department Attending provider initial evaluation. The plan of care may be reviewed in the ED Provider note.     The Plan of Care was developed with the following considerations:   Patient / Family readiness to learn indicated by:verbalized understanding  Persons(s) to be included in education: patient  Barriers to Learning/Limitations:No    Signed     Tereza Danielson RN    6/27/2022   2:03 AM

## 2022-06-27 NOTE — ED TRIAGE NOTES
Pt comes in with c/o urinary tract infection that was diagnosed at [facility] 2 weeks ago. Pt was not treated bc she left before she was seen. Pt is 21 weeks pregnant. Pt c/u foul smelling urine.

## 2022-06-28 LAB
BACTERIA SPEC CULT: NORMAL
CC UR VC: NORMAL
SERVICE CMNT-IMP: NORMAL

## 2022-07-23 ENCOUNTER — HOSPITAL ENCOUNTER (EMERGENCY)
Age: 27
Discharge: HOME | End: 2022-07-23
Attending: EMERGENCY MEDICINE
Payer: COMMERCIAL

## 2022-07-23 VITALS
HEART RATE: 99 BPM | WEIGHT: 177 LBS | HEIGHT: 61 IN | OXYGEN SATURATION: 99 % | DIASTOLIC BLOOD PRESSURE: 73 MMHG | BODY MASS INDEX: 33.42 KG/M2 | TEMPERATURE: 97.9 F | RESPIRATION RATE: 18 BRPM | SYSTOLIC BLOOD PRESSURE: 127 MMHG

## 2022-07-23 DIAGNOSIS — N30.00 ACUTE CYSTITIS WITHOUT HEMATURIA: Primary | ICD-10-CM

## 2022-07-23 LAB
APPEARANCE UR: ABNORMAL
BACTERIA URNS QL MICRO: ABNORMAL /HPF
BILIRUB UR QL: NEGATIVE
CLUE CELLS VAG QL WET PREP: NORMAL
COLOR UR: ABNORMAL
EPITH CASTS URNS QL MICRO: ABNORMAL /LPF
GLUCOSE UR STRIP.AUTO-MCNC: NEGATIVE MG/DL
HGB UR QL STRIP: NEGATIVE
KETONES UR QL STRIP.AUTO: NEGATIVE MG/DL
KOH PREP SPEC: NORMAL
LEUKOCYTE ESTERASE UR QL STRIP.AUTO: ABNORMAL
MUCOUS THREADS URNS QL MICRO: ABNORMAL /LPF
NITRITE UR QL STRIP.AUTO: NEGATIVE
PH UR STRIP: 5.5 [PH] (ref 5–8)
PROT UR STRIP-MCNC: NEGATIVE MG/DL
RBC #/AREA URNS HPF: ABNORMAL /HPF (ref 0–5)
SERVICE CMNT-IMP: NORMAL
SP GR UR REFRACTOMETRY: 1.02
T VAGINALIS VAG QL WET PREP: NORMAL
UA: UC IF INDICATED,UAUC: ABNORMAL
UROBILINOGEN UR QL STRIP.AUTO: 0.2 EU/DL (ref 0.2–1)
WBC URNS QL MICRO: ABNORMAL /HPF (ref 0–4)

## 2022-07-23 PROCEDURE — 87210 SMEAR WET MOUNT SALINE/INK: CPT

## 2022-07-23 PROCEDURE — 99283 EMERGENCY DEPT VISIT LOW MDM: CPT

## 2022-07-23 PROCEDURE — 81001 URINALYSIS AUTO W/SCOPE: CPT

## 2022-07-23 PROCEDURE — 87086 URINE CULTURE/COLONY COUNT: CPT

## 2022-07-23 RX ORDER — NITROFURANTOIN 25; 75 MG/1; MG/1
100 CAPSULE ORAL 2 TIMES DAILY
Qty: 14 CAPSULE | Refills: 0 | Status: SHIPPED | OUTPATIENT
Start: 2022-07-23 | End: 2022-07-30

## 2022-07-23 NOTE — ED PROVIDER NOTES
EMERGENCY DEPARTMENT HISTORY AND PHYSICAL EXAM      Date: 7/23/2022  Patient Name: Anyi Peña    Please note that this dictation was completed with [employer], the computer voice recognition software. Quite often unanticipated grammatical, syntax, homophones, and other interpretive errors are inadvertently transcribed by the computer software. Please disregard these errors. Please excuse any errors that have escaped final proofreading. History of Presenting Illness     Chief Complaint   Patient presents with    Vaginal Discharge    Urinary Pain       History Provided By: Patient     HPI: Anyi Peña, 33 y.o. female, G4, P3 at 22 weeks gestation presenting the emergency department complaining of vaginal discharge, dysuria. Symptoms been going on for 1 day for the dysuria. She had 1 episode last night of burning with urination. She also reports a yellowish vaginal discharge that is been going on for 1 week. Patient denies chance of STD. States that she has been tested recently and has not had sex since she was tested. She denies any fevers or chills. Denies any vaginal bleeding. Feeling baby move. She recently had her 20-week ultrasound is having a baby girl    PCP: None    No current facility-administered medications on file prior to encounter. Current Outpatient Medications on File Prior to Encounter   Medication Sig Dispense Refill    ondansetron (ZOFRAN ODT) 4 mg disintegrating tablet Take 1 Tablet by mouth every eight (8) hours as needed for Nausea or Vomiting. 12 Tablet 0    ondansetron hcl (Zofran) 4 mg tablet Take 1 Tablet by mouth every twelve (12) hours as needed for Nausea or Vomiting. 20 Tablet 0    hydrocortisone (Preparation H Hydrocortisone) 1 % topical cream Apply  to affected area two (2) times a day. use thin layer 30 g 0    butalbital-acetaminophen-caff (Fioricet) -40 mg per capsule Take 1 Capsule by mouth every four (4) hours as needed for Headache.  20 Capsule 0 butalbital-acetaminophen-caff (FIORICET) -40 mg per capsule Take 1 Cap by mouth every four (4) hours as needed for Headache. (Patient not taking: Reported on 7/23/2021) 20 Cap 0    sertraline (ZOLOFT) 50 mg tablet Take 1 Tab by mouth daily. Indications: Anxiousness associated with Depression (Patient not taking: Reported on 7/23/2021) 30 Tab 0    prenatal multivit-ca-min-fe-fa (PRENATAL VITAMIN) tab Take 1 Tab by mouth daily. (Patient not taking: Reported on 7/23/2021) 30 Tab 0       Past History     Past Medical History:  Past Medical History:   Diagnosis Date    Anemia during pregnancy in second trimester 10/1/2019    Chlamydia     Hypertension     Murmur 10/1/2019       Past Surgical History:  No past surgical history on file. Family History:  No family history on file. Social History:  Social History     Tobacco Use    Smoking status: Light Smoker     Packs/day: 0.25     Types: Cigarettes    Smokeless tobacco: Never   Substance Use Topics    Alcohol use: Not Currently    Drug use: No       Allergies:  No Known Allergies      Review of Systems   Review of Systems   Constitutional:  Negative for chills and fever. HENT:  Negative for congestion and sore throat. Eyes:  Negative for visual disturbance. Respiratory:  Negative for cough and shortness of breath. Cardiovascular:  Negative for chest pain and leg swelling. Gastrointestinal:  Negative for abdominal pain, blood in stool, diarrhea and nausea. Endocrine: Negative for polyuria. Genitourinary:  Positive for dysuria and vaginal discharge. Negative for flank pain and vaginal bleeding. Musculoskeletal:  Negative for myalgias. Skin:  Negative for rash. Allergic/Immunologic: Negative for immunocompromised state. Neurological:  Negative for weakness and headaches. Psychiatric/Behavioral:  Negative for confusion. Physical Exam   Physical Exam  Constitutional:       Appearance: Normal appearance.    HENT:      Head: Normocephalic and atraumatic. Mouth/Throat:      Mouth: Mucous membranes are moist.   Pulmonary:      Effort: Pulmonary effort is normal. No respiratory distress. Abdominal:      General: There is no distension. Musculoskeletal:         General: No deformity. Skin:     General: Skin is warm and dry. Neurological:      General: No focal deficit present. Mental Status: She is alert and oriented to person, place, and time. Psychiatric:         Behavior: Behavior normal.       Diagnostic Study Results     Labs -   No results found for this or any previous visit (from the past 12 hour(s)). Radiologic Studies -   No orders to display     CT Results  (Last 48 hours)      None          CXR Results  (Last 48 hours)      None              Medical Decision Making   I am the first provider for this patient. I reviewed the vital signs, available nursing notes, past medical history, past surgical history, family history and social history. Vital Signs-Reviewed the patient's vital signs. No data found. Records Reviewed:   Nursing notes, Prior visits     Provider Notes (Medical Decision Making):   Patient here with pelvic discomfort, dysuria, vaginal discharge. Differential includes UTI, STI, BV, vaginal candidiasis, and chemical irritation. No concern for labor at this time    ED Course:   Initial assessment performed. The patients presenting problems have been discussed, and they are in agreement with the care plan formulated and outlined with them. I have encouraged them to ask questions as they arise throughout their visit. Critical Care Time:   None      Disposition:    DISCHARGE NOTE  Patients results have been reviewed with them.   Patient and/or family have verbally conveyed their understanding and agreement of the patient's signs, symptoms, diagnosis, treatment and prognosis and additionally agree to follow up as recommended or return to the Emergency Room should their condition change or have any new concerns prior to their follow-up appointment. Patient verbally agrees with the care-plan and verbally conveys that all of their questions have been answered. Discharge instructions have also been provided to the patient with some educational information regarding their diagnosis as well a list of reasons why they would want to return to the ER prior to their follow-up appointment should their condition change. PLAN:  1. Discharge Medication List as of 7/23/2022  6:39 PM        START taking these medications    Details   nitrofurantoin, macrocrystal-monohydrate, (Macrobid) 100 mg capsule Take 1 Capsule by mouth two (2) times a day for 7 days. , Normal, Disp-14 Capsule, R-0           CONTINUE these medications which have NOT CHANGED    Details   ondansetron (ZOFRAN ODT) 4 mg disintegrating tablet Take 1 Tablet by mouth every eight (8) hours as needed for Nausea or Vomiting., Normal, Disp-12 Tablet, R-0      ondansetron hcl (Zofran) 4 mg tablet Take 1 Tablet by mouth every twelve (12) hours as needed for Nausea or Vomiting., Print, Disp-20 Tablet, R-0      hydrocortisone (Preparation H Hydrocortisone) 1 % topical cream Apply  to affected area two (2) times a day. use thin layer, Normal, Disp-30 g, R-0      !! butalbital-acetaminophen-caff (Fioricet) -40 mg per capsule Take 1 Capsule by mouth every four (4) hours as needed for Headache., Normal, Disp-20 Capsule, R-0      !! butalbital-acetaminophen-caff (FIORICET) -40 mg per capsule Take 1 Cap by mouth every four (4) hours as needed for Headache., Print, Disp-20 Cap, R-0      sertraline (ZOLOFT) 50 mg tablet Take 1 Tab by mouth daily. Indications: Anxiousness associated with Depression, Print, Disp-30 Tab, R-0      prenatal multivit-ca-min-fe-fa (PRENATAL VITAMIN) tab Take 1 Tab by mouth daily. , Print, Disp-30 Tab, R-0       !! - Potential duplicate medications found. Please discuss with provider.         2.   Follow-up Information       Follow up With Specialties Details Why [address] EMERGENCY DEPT Emergency Medicine  If symptoms worsen Miguel A Carmel  [phone]            Return to ED if worse     Diagnosis     Clinical Impression:   1. Acute cystitis without hematuria        Attestations:   This note was completed by Sherwin Or,

## 2022-07-23 NOTE — ED TRIAGE NOTES
CC  of \"yellowish\" vaginal discharge times 1 week and burning with urination times 1 day. Pt 25 weeks pregnant, , states that she is not concerned for STD because partner is .  Pt denies urinary frequency, urgency, abdominal pain, fever, chills, and N/V.

## 2022-07-24 LAB
BACTERIA SPEC CULT: NORMAL
SERVICE CMNT-IMP: NORMAL

## 2022-09-28 ENCOUNTER — HOSPITAL ENCOUNTER (EMERGENCY)
Age: 27
Discharge: HOME | End: 2022-09-28
Attending: EMERGENCY MEDICINE
Payer: COMMERCIAL

## 2022-09-28 VITALS
HEART RATE: 80 BPM | BODY MASS INDEX: 33.42 KG/M2 | SYSTOLIC BLOOD PRESSURE: 128 MMHG | DIASTOLIC BLOOD PRESSURE: 63 MMHG | WEIGHT: 177 LBS | OXYGEN SATURATION: 99 % | TEMPERATURE: 98.2 F | HEIGHT: 61 IN | RESPIRATION RATE: 15 BRPM

## 2022-09-28 DIAGNOSIS — Z04.1 EXAM FOLLOWING MVC (MOTOR VEHICLE COLLISION), NO APPARENT INJURY: Primary | ICD-10-CM

## 2022-09-28 DIAGNOSIS — Z34.90 PREGNANCY, UNSPECIFIED GESTATIONAL AGE: ICD-10-CM

## 2022-09-28 PROCEDURE — 99282 EMERGENCY DEPT VISIT SF MDM: CPT

## 2022-09-28 NOTE — Clinical Note
[facility] EMERGENCY DEPT  [address] [facility] [id number] [phone]    Work/School Note    Date: 9/28/2022    To Whom It May concern:    Nafisa Sheffield was seen and treated today in the emergency room by the following provider(s):  Attending Provider: Mary Grubbs MD  Physician Assistant: RIN Barahona.      Nafisa Sheffield is excused from work/school on 09/28/22 and 09/29/22. She is medically clear to return to work/school on 9/30/2022.        Sincerely,          Jovanny Mccarthy

## 2022-09-28 NOTE — Clinical Note
[facility] EMERGENCY DEPT  [address] [facility] [id number] [phone]    Work/School Note    Date: 9/28/2022    To Whom It May concern:      Charbel Mann was seen and treated today in the emergency room by the following provider(s):  Attending Provider: Xuan Dubose MD  Physician Assistant: Remonia Meigs, PA.      Charbel Mann is excused from work/school on 09/28/22. She is clear to return to work/school on 09/29/22.         Sincerely,          Jovanny Tolbert

## 2022-09-29 NOTE — ED NOTES
Emergency Department Nursing Plan of Care       The Nursing Plan of Care is developed from the Nursing assessment and Emergency Department Attending provider initial evaluation. The plan of care may be reviewed in the ED Provider note.     The Plan of Care was developed with the following considerations:   Patient / Family readiness to learn indicated by:verbalized understanding  Persons(s) to be included in education: patient  Barriers to Learning/Limitations:No    Signed     Elton Craft RN    9/28/2022   11:00 PM
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
## 2022-11-07 ENCOUNTER — HOSPITAL ENCOUNTER (EMERGENCY)
Age: 27
Discharge: HOME | End: 2022-11-07
Attending: EMERGENCY MEDICINE
Payer: COMMERCIAL

## 2022-11-07 VITALS
RESPIRATION RATE: 18 BRPM | DIASTOLIC BLOOD PRESSURE: 82 MMHG | WEIGHT: 160 LBS | OXYGEN SATURATION: 94 % | BODY MASS INDEX: 30.21 KG/M2 | HEART RATE: 49 BPM | SYSTOLIC BLOOD PRESSURE: 125 MMHG | HEIGHT: 61 IN | TEMPERATURE: 97.9 F

## 2022-11-07 DIAGNOSIS — K64.4 EXTERNAL HEMORRHOIDS: Primary | ICD-10-CM

## 2022-11-07 PROCEDURE — 74011000250 HC RX REV CODE- 250: Performed by: EMERGENCY MEDICINE

## 2022-11-07 PROCEDURE — 99283 EMERGENCY DEPT VISIT LOW MDM: CPT

## 2022-11-07 RX ORDER — HYDROCORTISONE 25 MG/G
CREAM TOPICAL 4 TIMES DAILY
Qty: 30 G | Refills: 0 | OUTPATIENT
Start: 2022-11-07 | End: 2022-11-08

## 2022-11-07 RX ORDER — LIDOCAINE HYDROCHLORIDE 20 MG/ML
JELLY TOPICAL
Status: DISCONTINUED | OUTPATIENT
Start: 2022-11-07 | End: 2022-11-07 | Stop reason: CLARIF

## 2022-11-07 RX ORDER — LIDOCAINE HYDROCHLORIDE 20 MG/ML
JELLY TOPICAL
Status: COMPLETED | OUTPATIENT
Start: 2022-11-07 | End: 2022-11-07

## 2022-11-07 RX ORDER — POLYETHYLENE GLYCOL 3350 17 G/17G
17 POWDER, FOR SOLUTION ORAL DAILY
Qty: 510 G | Refills: 0 | Status: SHIPPED | OUTPATIENT
Start: 2022-11-07

## 2022-11-07 RX ADMIN — LIDOCAINE HYDROCHLORIDE: 20 JELLY TOPICAL at 12:02

## 2022-11-07 NOTE — ED PROVIDER NOTES
EMERGENCY DEPARTMENT HISTORY AND PHYSICAL EXAM      Date: 11/7/2022  Patient Name: Jere Zurita    History of Presenting Illness     Chief Complaint   Patient presents with    Anal Pain     History Provided By: Patient    HPI: Jere Zurita, 33 y.o. female with past medical history significant for hypertension who presents via private vehicle to the ED with cc of anal pain and hemorrhoids that started this morning. Patient states she has had these in the past but they just returned. She does endorse some constipation and denies taking any medications for the pain. She is 3 weeks postpartum and is currently taking 600 mg of ibuprofen. She denies any rectal bleeding, dysuria, urinary frequency, or hematuria. She complains of a sharp/aching pain when she walks, sits, or anytime anything touches her anus. She has a follow-up appointment scheduled with her OB/GYN in 2 days. She is bottlefeeding. PMHx: Hypertension  Social Hx: Smokes a few cigarettes daily, denies alcohol use, denies illegal drug use    PCP: None    There are no other complaints, changes, or physical findings at this time. No current facility-administered medications on file prior to encounter. Current Outpatient Medications on File Prior to Encounter   Medication Sig Dispense Refill    ondansetron (ZOFRAN ODT) 4 mg disintegrating tablet Take 1 Tablet by mouth every eight (8) hours as needed for Nausea or Vomiting. 12 Tablet 0    ondansetron hcl (Zofran) 4 mg tablet Take 1 Tablet by mouth every twelve (12) hours as needed for Nausea or Vomiting. 20 Tablet 0    [DISCONTINUED] hydrocortisone (Preparation H Hydrocortisone) 1 % topical cream Apply  to affected area two (2) times a day. use thin layer 30 g 0    [DISCONTINUED] butalbital-acetaminophen-caff (Fioricet) -40 mg per capsule Take 1 Capsule by mouth every four (4) hours as needed for Headache.  20 Capsule 0    [DISCONTINUED] butalbital-acetaminophen-caff (FIORICET) -40 mg per capsule Take 1 Cap by mouth every four (4) hours as needed for Headache. (Patient not taking: Reported on 7/23/2021) 20 Cap 0    sertraline (ZOLOFT) 50 mg tablet Take 1 Tab by mouth daily. Indications: Anxiousness associated with Depression (Patient not taking: Reported on 7/23/2021) 30 Tab 0    prenatal multivit-ca-min-fe-fa (PRENATAL VITAMIN) tab Take 1 Tab by mouth daily. (Patient not taking: Reported on 7/23/2021) 30 Tab 0     Past History     Past Medical History:  Past Medical History:   Diagnosis Date    Anemia during pregnancy in second trimester 10/1/2019    Chlamydia     Hypertension     Murmur 10/1/2019     Past Surgical History:  History reviewed. No pertinent surgical history. Family History:  History reviewed. No pertinent family history. Social History:  Social History     Tobacco Use    Smoking status: Light Smoker     Packs/day: 0.25     Types: Cigarettes    Smokeless tobacco: Never   Substance Use Topics    Alcohol use: Not Currently    Drug use: No     Allergies:  No Known Allergies  Review of Systems   Review of Systems   Constitutional:  Negative for chills and fever. HENT:  Negative for congestion, rhinorrhea, sneezing and sore throat. Eyes:  Negative for redness and visual disturbance. Respiratory:  Negative for shortness of breath. Cardiovascular:  Negative for leg swelling. Gastrointestinal:  Positive for rectal pain. Negative for abdominal pain, nausea and vomiting. External hemorrhoids   Genitourinary:  Negative for difficulty urinating and frequency. Musculoskeletal:  Negative for back pain, myalgias and neck stiffness. Skin:  Negative for rash. Neurological:  Negative for dizziness, syncope, weakness and headaches. Hematological:  Negative for adenopathy. All other systems reviewed and are negative. Physical Exam   Physical Exam  Vitals and nursing note reviewed. Constitutional:       General: She is in acute distress.       Appearance: Normal appearance. She is well-developed. HENT:      Head: Normocephalic and atraumatic. Eyes:      Conjunctiva/sclera: Conjunctivae normal.   Cardiovascular:      Rate and Rhythm: Regular rhythm. Bradycardia present. Pulses: Normal pulses. Heart sounds: Normal heart sounds, S1 normal and S2 normal.   Pulmonary:      Effort: Pulmonary effort is normal. No respiratory distress. Breath sounds: Normal breath sounds. No wheezing. Abdominal:      General: Bowel sounds are normal. There is no distension. Palpations: Abdomen is soft. Tenderness: There is no abdominal tenderness. There is no rebound. Genitourinary:     Rectum: External hemorrhoid (multiple non-thrombosed) present. Musculoskeletal:         General: Normal range of motion. Cervical back: Full passive range of motion without pain, normal range of motion and neck supple. Skin:     General: Skin is warm and dry. Findings: No rash. Neurological:      Mental Status: She is alert and oriented to person, place, and time. Psychiatric:         Speech: Speech normal.         Behavior: Behavior normal.         Thought Content: Thought content normal.         Judgment: Judgment normal.     Diagnostic Study Results   Labs -   No results found for this or any previous visit (from the past 12 hour(s)). Radiologic Studies -   No orders to display     No results found. Medical Decision Making   I am the first provider for this patient. I reviewed the vital signs, available nursing notes, past medical history, past surgical history, family history and social history. Vital Signs-Reviewed the patient's vital signs.   Patient Vitals for the past 24 hrs:   Temp Pulse Resp BP SpO2   11/07/22 1107 97.9 °F (36.6 °C) (!) 49 18 125/82 94 %     Pulse Oximetry Analysis - 94% on RA (normal)    Records Reviewed: Nursing Notes and Old Medical Records    Provider Notes (Medical Decision Making):   50-year-old female presents with multiple large external hemorrhoids that are nonthrombosed. Will treat with topical lidocaine for pain, steroid cream, and MiraLAX to prevent constipation and have her follow-up with her OB in 2 days as well as general surgery as needed if the symptoms persist.    ED Course:   Initial assessment performed. The patients presenting problems have been discussed, and they are in agreement with the care plan formulated and outlined with them. I have encouraged them to ask questions as they arise throughout their visit. Progress Note:   Updated pt on all returned results and findings. Discussed the importance of proper follow up as referred below along with return precautions. Pt in agreement with the care plan and expresses agreement with and understanding of all items discussed. Disposition:  Discharge discharge Note:  The pt is ready for discharge. The pt's signs, symptoms, diagnosis, and discharge instructions have been discussed and pt has conveyed their understanding. The pt is to follow up as recommended or return to ER should their symptoms worsen. Plan has been discussed and pt is in agreement. PLAN:  1. Current Discharge Medication List        START taking these medications    Details   hydrocortisone (Proctozone-HC) 2.5 % rectal cream Insert  into rectum four (4) times daily. Qty: 30 g, Refills: 0  Start date: 11/7/2022      polyethylene glycol (Miralax) 17 gram/dose powder Take 17 g by mouth daily. 1 tablespoon with 8 oz of water daily  Qty: 510 g, Refills: 0  Start date: 11/7/2022           2. Follow-up Information       Follow up With Specialties Details Why Contact Info    [facility] [address] in 2 days to arrange OB/GYN follow up [address] [address]  [phone]    Tereza Ordonez MD General Surgery, Surgery General, Oncology Schedule an appointment as soon as possible for a visit  As needed [address] [place] [address] EMERGENCY DEPT Emergency Medicine  As needed, If symptoms worsen [address] [facility]  [phone]          Return to ED if worse     Diagnosis     Clinical Impression:   1. External hemorrhoids            Please note that this dictation was completed with Dragon, computer voice recognition software. Quite often unanticipated grammatical, syntax, homophones, and other interpretive errors are inadvertently transcribed by the computer software. Please disregard these errors. Additionally, please excuse any errors that have escaped final proofreading. [facility] Pregnancy And Lactation Text: This medication is considered safe during pregnancy and breastfeeding.

## 2022-11-07 NOTE — ED NOTES
Pt presents to ED ambulatory complaining of anal pain from hemorrhoids x this morning. Pt is alert and oriented x 4, RR even and unlabored. Assessment completed and pt updated on plan of care. Call bell in reach. Emergency Department Nursing Plan of Care       The Nursing Plan of Care is developed from the Nursing assessment and Emergency Department Attending provider initial evaluation. The plan of care may be reviewed in the ED Provider note.     The Plan of Care was developed with the following considerations:   Patient / Family readiness to learn indicated by:verbalized understanding  Persons(s) to be included in education: patient  Barriers to Learning/Limitations:No    Signed     Lilliam Rodriguez    11/7/2022   11:39 AM

## 2022-11-07 NOTE — ED NOTES
Discharge instructions were given to the patient by Harlen Severance, RN. The patient left the Emergency Department ambulatory, alert and oriented and in no acute distress with 2 prescriptions. The patient was encouraged to call or return to the ED for worsening issues or problems and was encouraged to schedule a follow up appointment for continuing care. The patient verbalized understanding of discharge instructions and prescriptions, all questions were answered. The patient has no further concerns at this time.

## 2022-11-07 NOTE — ED TRIAGE NOTES
Patient presents to the Ed with c/o hemorrhoids and anal pain that started today. Pt reports giving birth vaginal birth on October 28th. Pt denies any complications with birth and has not called her OBGYN.

## 2022-11-08 ENCOUNTER — HOSPITAL ENCOUNTER (EMERGENCY)
Age: 27
Discharge: HOME | End: 2022-11-08
Attending: EMERGENCY MEDICINE
Payer: COMMERCIAL

## 2022-11-08 VITALS
SYSTOLIC BLOOD PRESSURE: 147 MMHG | RESPIRATION RATE: 18 BRPM | WEIGHT: 160 LBS | HEIGHT: 71 IN | OXYGEN SATURATION: 97 % | DIASTOLIC BLOOD PRESSURE: 94 MMHG | HEART RATE: 88 BPM | TEMPERATURE: 98.5 F | BODY MASS INDEX: 22.4 KG/M2

## 2022-11-08 DIAGNOSIS — K64.4 HEMORRHOIDS, EXTERNAL: Primary | ICD-10-CM

## 2022-11-08 PROCEDURE — 99283 EMERGENCY DEPT VISIT LOW MDM: CPT

## 2022-11-08 PROCEDURE — 74011000250 HC RX REV CODE- 250: Performed by: EMERGENCY MEDICINE

## 2022-11-08 PROCEDURE — 74011250637 HC RX REV CODE- 250/637: Performed by: EMERGENCY MEDICINE

## 2022-11-08 RX ORDER — LIDOCAINE HCL AND HYDROCORTISONE ACETATE 20; 20 MG/G; MG/G
1 CREAM RECTAL
Qty: 1 EACH | Refills: 0 | Status: SHIPPED | OUTPATIENT
Start: 2022-11-08

## 2022-11-08 RX ORDER — LIDOCAINE HYDROCHLORIDE 20 MG/ML
JELLY TOPICAL
Status: COMPLETED | OUTPATIENT
Start: 2022-11-08 | End: 2022-11-08

## 2022-11-08 RX ORDER — OXYCODONE HYDROCHLORIDE 5 MG/1
5 TABLET ORAL
Qty: 12 TABLET | Refills: 0 | Status: SHIPPED | OUTPATIENT
Start: 2022-11-08 | End: 2022-11-11

## 2022-11-08 RX ORDER — OXYCODONE HYDROCHLORIDE 5 MG/1
5 TABLET ORAL
Status: COMPLETED | OUTPATIENT
Start: 2022-11-08 | End: 2022-11-08

## 2022-11-08 RX ADMIN — LIDOCAINE HYDROCHLORIDE 10 ML: 20 JELLY TOPICAL at 08:52

## 2022-11-08 RX ADMIN — OXYCODONE HYDROCHLORIDE 5 MG: 5 TABLET ORAL at 08:52

## 2022-11-08 NOTE — ED PROVIDER NOTES
EMERGENCY DEPARTMENT HISTORY AND PHYSICAL EXAM        Date: 11/8/2022  Patient Name: Sonali Valdovinos    History of Presenting Illness     Chief Complaint   Patient presents with    Hemorrhoids     Per pt reports hemorrhoids x 2 days, denies bleeding from rectum. Seen here yesterday for same symptoms. History Provided By: Patient    HPI: Sonali Valdovinos, 33 y.o. female presents to the ED with cc of rectal pain and hemorrhoids. Symptoms have been present over the past 2 days, progressively worsening. She has been constipated and straining to use bowel movement over the past 1 week. She has history of hemorrhoids. She was seen in the ED yesterday and prescribed 2.5% rectal hydrocortisone cream.  She states that symptoms are not improving despite this and use of stool softeners and MiraLAX. She denies any bleeding or abdominal pain. She is recent status post spontaneous vaginal delivery followed by [facility] and has an appointment to see her OB tomorrow. She presents today needing something for pain as her hemorrhoids are getting larger and more painful. There are no other complaints, changes, or physical findings at this time. PCP: None    Current Facility-Administered Medications on File Prior to Encounter   Medication Dose Route Frequency Provider Last Rate Last Admin    [COMPLETED] lidocaine (URO-JET/ GLYDO) 2 % jelly   Urethral NOW Savannah Campuzano MD   Given at 11/07/22 1202    [DISCONTINUED] lidocaine (XYLOCAINE) 2 % jelly   Topical NOW Savannah Campuzano MD        [DISCONTINUED] phenyleph-min oil-petrolatum (PREPARATION H) 0.25-14-74.9 % ointment   PeriANAL ONCE Savannah Campuzano MD         Current Outpatient Medications on File Prior to Encounter   Medication Sig Dispense Refill    polyethylene glycol (Miralax) 17 gram/dose powder Take 17 g by mouth daily.  1 tablespoon with 8 oz of water daily 510 g 0    [DISCONTINUED] hydrocortisone (Proctozone-HC) 2.5 % rectal cream Insert  into rectum four (4) times daily. 30 g 0    ondansetron (ZOFRAN ODT) 4 mg disintegrating tablet Take 1 Tablet by mouth every eight (8) hours as needed for Nausea or Vomiting. 12 Tablet 0    ondansetron hcl (Zofran) 4 mg tablet Take 1 Tablet by mouth every twelve (12) hours as needed for Nausea or Vomiting. 20 Tablet 0    sertraline (ZOLOFT) 50 mg tablet Take 1 Tab by mouth daily. Indications: Anxiousness associated with Depression (Patient not taking: No sig reported) 30 Tab 0    prenatal multivit-ca-min-fe-fa (PRENATAL VITAMIN) tab Take 1 Tab by mouth daily. (Patient not taking: No sig reported) 30 Tab 0       Past History     Past Medical History:  Past Medical History:   Diagnosis Date    Anemia during pregnancy in second trimester 10/1/2019    Chlamydia     Hypertension     Murmur 10/1/2019       Past Surgical History:  History reviewed. No pertinent surgical history. Family History:  History reviewed. No pertinent family history. Social History:  Social History     Tobacco Use    Smoking status: Light Smoker     Packs/day: 0.25     Types: Cigarettes    Smokeless tobacco: Never   Substance Use Topics    Alcohol use: Not Currently    Drug use: No       Allergies:  No Known Allergies      Review of Systems   Review of Systems   Constitutional:  Negative for chills and fever. Gastrointestinal:  Positive for abdominal pain and constipation. Negative for anal bleeding, blood in stool, nausea and vomiting. Skin:  Negative for color change and rash. All other systems reviewed and are negative. Physical Exam   Physical Exam  Vitals and nursing note reviewed. Exam conducted with a chaperone present. Constitutional:       General: She is not in acute distress. Appearance: Normal appearance. She is not ill-appearing or toxic-appearing. HENT:      Head: Normocephalic and atraumatic. Cardiovascular:      Rate and Rhythm: Normal rate and regular rhythm.    Pulmonary:      Effort: Pulmonary effort is normal. No respiratory distress. Genitourinary:     Comments: Female RN chaperone present. Patient has multiple large grape sized nonthrombosed hemorrhoids which are soft to the touch but tender. No sign of anal prolapse. No active bleeding. Skin:     General: Skin is warm and dry. Findings: No erythema or rash. Neurological:      General: No focal deficit present. Mental Status: She is alert and oriented to person, place, and time. Diagnostic Study Results     Labs -   No results found for this or any previous visit (from the past 12 hour(s)). Radiologic Studies -   No orders to display     CT Results  (Last 48 hours)      None          CXR Results  (Last 48 hours)      None            Medical Decision Making   I am the first provider for this patient. I reviewed the vital signs, available nursing notes, past medical history, past surgical history, family history and social history. Vital Signs-Reviewed the patient's vital signs. Patient Vitals for the past 12 hrs:   Temp Pulse Resp BP SpO2   11/08/22 0754 98.5 °F (36.9 °C) 88 18 (!) 147/94 97 %       Records Reviewed: Nursing Notes    Provider Notes (Medical Decision Making):   23-year-old postpartum female here with multiple nonthrombosed large hemorrhoids. No bleeding, no sign of thrombosis. Hemorrhoids are quite large but there does not appear to be any need for emergent hemorrhoidectomy today as no thrombosis or strangulation. Recommend continue conservative therapy with sitz bath's, topical lidocaine, topical hydrocortisone cream, and I will provide p.o. analgesia as needed oxycodone. Patient has follow-up with her OB/GYN tomorrow and I encouraged her to discuss her hemorrhoids with her OB. Additionally recommend adding Metamucil/psyllium as well as MiraLAX daily to prevent constipation. I will also refer to Dr. Liset Odell, surgery. All questions answered and patient agrees to plan as above.       ED Course:   Initial assessment performed. The patients presenting problems have been discussed, and they are in agreement with the care plan formulated and outlined with them. I have encouraged them to ask questions as they arise throughout their visit. Discharge Note:  The patient has been re-evaluated and is ready for discharge. Reviewed available results with patient. Counseled patient on diagnosis and care plan. Patient has expressed understanding, and all questions have been answered. Patient agrees with plan and agrees to follow up as recommended, or to return to the ED if their symptoms worsen. Discharge instructions have been provided and explained to the patient, along with reasons to return to the ED. Disposition:  discharge    DISCHARGE PLAN:  1. Current Discharge Medication List        START taking these medications    Details   lidocaine/hydrocortisone ac (lidocaine HCl-hydrocortison ac) 2 %-2 % (7 gram) kit Insert 1 Each into rectum two (2) times daily as needed for Pain. Qty: 1 Each, Refills: 0  Start date: 11/8/2022      oxyCODONE IR (Roxicodone) 5 mg immediate release tablet Take 1 Tablet by mouth every six (6) hours as needed for Pain for up to 3 days. Max Daily Amount: 20 mg.  Qty: 12 Tablet, Refills: 0  Start date: 11/8/2022, End date: 11/11/2022    Associated Diagnoses: Hemorrhoids, external           2. Follow-up Information       Follow up With Specialties Details Why Contact Info    Edin Hernandez MD General Surgery, Surgery General, Oncology Schedule an appointment as soon as possible for a visit   [address] [id number] [address]  Schedule an appointment as soon as possible for a visit   [address] [address]  [phone]    [address] [place] [address] EMERGENCY DEPT Emergency Medicine Go to  As needed, If symptoms worsen [address] [facility]          3.   Return to ED if worse     Diagnosis     Clinical Impression:   1. Hemorrhoids, external        Attestations:  I am the first and primary provider of record for this patient's ED encounter. I personally performed the services described above in this documentation. Caryn Juan MD    Please note that this dictation was completed with View [employer] Che[employer], the computer voice recognition software. Quite often unanticipated grammatical, syntax, homophones, and other interpretive errors are inadvertently transcribed by the computer software. Please disregard these errors. Please excuse any errors that have escaped final proofreading. Thank you.

## 2022-11-08 NOTE — ED NOTES
Pt presents to ED complaining of worsening hemorrhoids x 2 days. Pt reports being seen for same and is using hydrocortisone cream without relief. No bleeding at this time. Pt is alert and oriented x 4, RR even and unlabored, skin is warm and dry. Assessment completed and pt updated on plan of care. Call bell in reach. Emergency Department Nursing Plan of Care       The Nursing Plan of Care is developed from the Nursing assessment and Emergency Department Attending provider initial evaluation. The plan of care may be reviewed in the ED Provider note.     The Plan of Care was developed with the following considerations:   Patient / Family readiness to learn indicated by:verbalized understanding  Persons(s) to be included in education: patient  Barriers to Learning/Limitations:No    Signed     Jayden Braxton RN    11/8/2022

## 2022-11-08 NOTE — ED NOTES
Patient has been instructed that they have been given oxycodone* which contains opioids, benzodiazepines, or other sedating drugs. Patient is aware that they  will need to refrain from driving or operating heavy machinery after taking this medication. Patient also instructed that they need to avoid drinking alcohol and using other products containing opioids, benzodiazepines, or other sedating drugs. Patient verbalized understanding.

## 2022-11-08 NOTE — ED NOTES
Discharge instructions were given to the patient by Tiff Bhardwaj RN. The patient left the Emergency Department ambulatory, alert and oriented and in no acute distress with 2 prescriptions. The patient was encouraged to call or return to the ED for worsening issues or problems and was encouraged to schedule a follow up appointment for continuing care. The patient verbalized understanding of discharge instructions and prescriptions, all questions were answered. The patient has no further concerns at this time.

## 2023-02-07 ENCOUNTER — HOSPITAL ENCOUNTER (EMERGENCY)
Age: 28
Discharge: HOME | End: 2023-02-07
Attending: EMERGENCY MEDICINE
Payer: COMMERCIAL

## 2023-02-07 VITALS
TEMPERATURE: 98.5 F | OXYGEN SATURATION: 99 % | RESPIRATION RATE: 15 BRPM | BODY MASS INDEX: 26.43 KG/M2 | HEART RATE: 80 BPM | WEIGHT: 140 LBS | DIASTOLIC BLOOD PRESSURE: 98 MMHG | HEIGHT: 61 IN | SYSTOLIC BLOOD PRESSURE: 159 MMHG

## 2023-02-07 DIAGNOSIS — B37.9 CANDIDIASIS: Primary | ICD-10-CM

## 2023-02-07 LAB
APPEARANCE UR: ABNORMAL
BACTERIA URNS QL MICRO: ABNORMAL /HPF
BILIRUB UR QL: NEGATIVE
C TRACH DNA SPEC QL NAA+PROBE: NEGATIVE
CAOX CRY URNS QL MICRO: ABNORMAL
CLUE CELLS VAG QL WET PREP: NORMAL
COLOR UR: ABNORMAL
EPITH CASTS URNS QL MICRO: ABNORMAL /LPF
GLUCOSE UR STRIP.AUTO-MCNC: NEGATIVE MG/DL
HCG UR QL: NEGATIVE
HGB UR QL STRIP: NEGATIVE
KETONES UR QL STRIP.AUTO: ABNORMAL MG/DL
KOH PREP SPEC: NORMAL
LEUKOCYTE ESTERASE UR QL STRIP.AUTO: ABNORMAL
MUCOUS THREADS URNS QL MICRO: ABNORMAL /LPF
N GONORRHOEA DNA SPEC QL NAA+PROBE: NEGATIVE
NITRITE UR QL STRIP.AUTO: NEGATIVE
PH UR STRIP: 6 (ref 5–8)
PROT UR STRIP-MCNC: 30 MG/DL
RBC #/AREA URNS HPF: ABNORMAL /HPF (ref 0–5)
SAMPLE TYPE: NORMAL
SERVICE CMNT-IMP: NORMAL
SERVICE CMNT-IMP: NORMAL
SP GR UR REFRACTOMETRY: 1.03 (ref 1–1.03)
SPECIMEN SOURCE: NORMAL
T VAGINALIS VAG QL WET PREP: NORMAL
UA: UC IF INDICATED,UAUC: ABNORMAL
UROBILINOGEN UR QL STRIP.AUTO: 1 EU/DL (ref 0.2–1)
WBC URNS QL MICRO: ABNORMAL /HPF (ref 0–4)
YEAST URNS QL MICRO: PRESENT

## 2023-02-07 PROCEDURE — 87491 CHLMYD TRACH DNA AMP PROBE: CPT

## 2023-02-07 PROCEDURE — 87210 SMEAR WET MOUNT SALINE/INK: CPT

## 2023-02-07 PROCEDURE — 99283 EMERGENCY DEPT VISIT LOW MDM: CPT

## 2023-02-07 PROCEDURE — 81025 URINE PREGNANCY TEST: CPT

## 2023-02-07 PROCEDURE — 87086 URINE CULTURE/COLONY COUNT: CPT

## 2023-02-07 PROCEDURE — 81001 URINALYSIS AUTO W/SCOPE: CPT

## 2023-02-07 RX ORDER — CEPHALEXIN 500 MG/1
500 CAPSULE ORAL 2 TIMES DAILY
Qty: 14 CAPSULE | Refills: 0 | Status: SHIPPED | OUTPATIENT
Start: 2023-02-07 | End: 2023-02-14

## 2023-02-07 RX ORDER — ESCITALOPRAM OXALATE 10 MG/1
10 TABLET ORAL DAILY
COMMUNITY

## 2023-02-07 RX ORDER — TERCONAZOLE 4 MG/G
1 CREAM VAGINAL
Qty: 45 G | Refills: 0 | Status: SHIPPED | OUTPATIENT
Start: 2023-02-07 | End: 2023-02-14

## 2023-02-07 NOTE — Clinical Note
[address] EMERGENCY DEPT  [address] [facility] [address] [phone]    Work/School Note    Date: 2/7/2023    To Whom It May concern:      Forwilburtine Bump was seen and treated today in the emergency room by the following provider(s):  Attending Provider: Mary Jama MD  Nurse Practitioner: Matias Funes NP. Yamilex Bermudez is excused from work/school on 02/07/23. She is clear to return to work/school on 02/08/23.         Sincerely,          Rafi Bender NP

## 2023-02-07 NOTE — DISCHARGE INSTRUCTIONS
It was a pleasure taking care of you at [facility] Emergency Department today. We know that when you come to [facility], you are entrusting us with your health, comfort, and safety. Our physicians and nurses honor that trust, and we truly appreciate the opportunity to care for you and your loved ones. We also value our feedback. If you receive a survey about your Emergency Department experience today, please fill it out. We care about our patients' feedback, and we listen to what you have to say. Thank you!

## 2023-02-07 NOTE — ED PROVIDER NOTES
[address] [facility] EMERGENCY DEPT  EMERGENCY DEPARTMENT ENCOUNTER       Pt Name: Lucero Parker  MRN: [id number]  [place] 1995  Date of evaluation: 2/7/2023  Provider: Eris Delcid NP   PCP: None  Note Started: 10:23 AM 2/7/23     ED attending involment: I have seen and evaluated the patient. My supervision physician was available for consultation. CHIEF COMPLAINT       Chief Complaint   Patient presents with    Vaginal Discharge        HISTORY OF PRESENT ILLNESS: 1 or more elements      History From: Patient  HPI Limitations : None     Lucero Parker is a 33 y.o. female who presents with vaginal discharge. Onset 2 days ago. Describes as white and itching. Denies vaginal lesions, odor, abd pain. Sexually active with no condoms or hormonal contraception use. She reports no concern for STD. Denies changes in soap, lotion or detergent. Denies trying OTC medication for relief. Nursing Notes were all reviewed and agreed with or any disagreements were addressed in the HPI. REVIEW OF SYSTEMS      Review of Systems   Constitutional:  Negative for chills and fever. Respiratory:  Negative for cough and shortness of breath. Gastrointestinal:  Negative for abdominal pain, constipation and vomiting. Genitourinary:  Positive for vaginal discharge. Negative for dysuria, frequency, genital sores, hematuria, menstrual problem, pelvic pain, urgency, vaginal bleeding and vaginal pain. Skin:  Negative for rash. Neurological:  Negative for dizziness and headaches. All other systems reviewed and are negative. Positives and Pertinent negatives as per HPI. PAST HISTORY     Past Medical History:  Past Medical History:   Diagnosis Date    Anemia during pregnancy in second trimester 10/1/2019    Chlamydia     Hypertension     Murmur 10/1/2019       Past Surgical History:  History reviewed. No pertinent surgical history. Family History:  History reviewed. No pertinent family history.     Social History:  Social History     Tobacco Use    Smoking status: Light Smoker     Packs/day: 0.25     Types: Cigarettes    Smokeless tobacco: Never   Substance Use Topics    Alcohol use: Not Currently    Drug use: No       Allergies:  No Known Allergies    CURRENT MEDICATIONS      Discharge Medication List as of 2/7/2023 11:25 AM        CONTINUE these medications which have NOT CHANGED    Details   escitalopram oxalate (LEXAPRO) 10 mg tablet Take 10 mg by mouth daily. , Historical Med      HYDROXYZINE HCL PO Take  by mouth., Historical Med      lidocaine/hydrocortisone ac (lidocaine HCl-hydrocortison ac) 2 %-2 % (7 gram) kit Insert 1 Each into rectum two (2) times daily as needed for Pain., Normal, Disp-1 Each, R-0      polyethylene glycol (Miralax) 17 gram/dose powder Take 17 g by mouth daily. 1 tablespoon with 8 oz of water daily, Normal, Disp-510 g, R-0      ondansetron (ZOFRAN ODT) 4 mg disintegrating tablet Take 1 Tablet by mouth every eight (8) hours as needed for Nausea or Vomiting., Normal, Disp-12 Tablet, R-0      ondansetron hcl (Zofran) 4 mg tablet Take 1 Tablet by mouth every twelve (12) hours as needed for Nausea or Vomiting., Print, Disp-20 Tablet, R-0      sertraline (ZOLOFT) 50 mg tablet Take 1 Tab by mouth daily. Indications: Anxiousness associated with Depression, Print, Disp-30 Tab, R-0      prenatal multivit-ca-min-fe-fa (PRENATAL VITAMIN) tab Take 1 Tab by mouth daily. , Print, Disp-30 Tab, R-0             PHYSICAL EXAM      ED Triage Vitals [02/07/23 1011]   ED Encounter Vitals Group      BP (!) 159/98      Pulse (Heart Rate) 80      Resp Rate 15      Temp 98.5 °F (36.9 °C)      Temp src       O2 Sat (%) 99 %      Weight 140 lb      Height 5' 1\"        Physical Exam  Vitals and nursing note reviewed. Constitutional:       General: She is not in acute distress. Appearance: She is well-developed. She is not ill-appearing. HENT:      Head: Normocephalic.    Cardiovascular:      Rate and Rhythm: Normal rate and regular rhythm. Pulses: Normal pulses. Heart sounds: Normal heart sounds. Pulmonary:      Effort: Pulmonary effort is normal.      Breath sounds: Normal breath sounds. Abdominal:      General: Bowel sounds are normal. There is no distension. Palpations: Abdomen is soft. There is no mass. Tenderness: There is no abdominal tenderness. There is no right CVA tenderness, left CVA tenderness or guarding. Musculoskeletal:      Cervical back: Normal range of motion and neck supple. Skin:     General: Skin is warm and dry. Neurological:      Mental Status: She is alert and oriented to person, place, and time. GCS: GCS eye subscore is 4. GCS verbal subscore is 5. GCS motor subscore is 6. DIAGNOSTIC RESULTS   LABS:     Recent Results (from the past 12 hour(s))   WET PREP    Collection Time: 02/07/23 10:57 AM    Specimen: Miscellaneous sample   Result Value Ref Range    Clue cells CLUE CELLS ABSENT      Wet prep NO TRICHOMONAS SEEN     KOH, OTHER SOURCES    Collection Time: 02/07/23 10:57 AM    Specimen: Vagina;  Other   Result Value Ref Range    Special Requests: NO SPECIAL REQUESTS      KOH 2+  YEAST WITH PSEUDOHYPHAE       URINALYSIS W/ REFLEX CULTURE    Collection Time: 02/07/23 10:57 AM    Specimen: Urine   Result Value Ref Range    Color YELLOW/STRAW      Appearance TURBID (A) CLEAR      Specific gravity 1.030 1.003 - 1.030      pH (UA) 6.0 5.0 - 8.0      Protein 30 (A) NEG mg/dL    Glucose Negative NEG mg/dL    Ketone TRACE (A) NEG mg/dL    Bilirubin Negative NEG      Blood Negative NEG      Urobilinogen 1.0 0.2 - 1.0 EU/dL    Nitrites Negative NEG      Leukocyte Esterase MODERATE (A) NEG      WBC 10-20 0 - 4 /hpf    RBC 0-5 0 - 5 /hpf    Epithelial cells MODERATE (A) FEW /lpf    Bacteria 2+ (A) NEG /hpf    UA:UC IF INDICATED URINE CULTURE ORDERED (A) CNI      Mucus 3+ (A) NEG /lpf    CA Oxalate crystals 3+ (A) NEG    Yeast PRESENT (A) NEG     HCG URINE, QL. - POC    Collection Time: 02/07/23 10:59 AM   Result Value Ref Range    Pregnancy test,urine (POC) Negative NEG             RADIOLOGY:  Non-plain film images such as CT, Ultrasound and MRI are read by the radiologist. Plain radiographic images are visualized and preliminarily interpreted by the ED Provider with the below findings:        Interpretation per the Radiologist below, if available at the time of this note:     No results found. PROCEDURES   Unless otherwise noted below, none  Procedures     EMERGENCY DEPARTMENT COURSE and DIFFERENTIAL DIAGNOSIS/MDM   Vitals:    Vitals:    02/07/23 1011   BP: (!) 159/98   Pulse: 80   Resp: 15   Temp: 98.5 °F (36.9 °C)   SpO2: 99%   Weight: 63.5 kg (140 lb)   Height: 5' 1\" (1.549 m)        Patient was given the following medications:  Medications - No data to display    CONSULTS: (Who and What was discussed)  None    Chronic Conditions: None    Social Determinants affecting Dx or Tx: None    Records Reviewed (source and summary): Prior medical records, Previous Laboratory studies, and Nursing notes    MDM (CC/HPI Summary, DDx, ED Course, Reassessment, Disposition Considerations -Tests not done, Shared Decision Making, Pt Expectation of Test or Tx.): 34 yo F presenting with vaginal discharge exhibiting benign abd exam.  exam deferred and has performed her genital swabs independently. She has no concern for STD and declines empiric treatment. DDX: Chlamydia, Gonorrhea, BV, Candidiasis, Trichomonas, UTI     ED Course as of 02/07/23 1331   Tue Feb 07, 2023   1130 Progress Note:   + yeast noted. Plan to treat with terconazole for candidiasis. Pt currently taking lexapro and is CI with diflucan. UA also significant for moderate leuks and 2+ bacteria but may likely be due to yeast infection. Will treat with keflex pending urine cx [NA]      ED Course User Index  [NA] Rafi Bender NP           FINAL IMPRESSION     1.  Candidiasis          DISPOSITION/PLAN   Discharged        Care plan outlined and precautions discussed. Patient has no new complaints, changes, or physical findings. All of pt's questions and concerns were addressed. Patient was instructed and agrees to follow up with PCP, as well as to return to the ED upon further deterioration. Patient is ready to go home. PATIENT REFERRED TO:  Follow-up Information       Follow up With Specialties Details Why [address]  [phone]              DISCHARGE MEDICATIONS:  Discharge Medication List as of 2/7/2023 11:25 AM        START taking these medications    Details   terconazole (TERAZOL 7) 0.4 % vaginal cream Insert 1 Applicator into vagina nightly for 7 days. , Normal, Disp-45 g, R-0      cephALEXin (Keflex) 500 mg capsule Take 1 Capsule by mouth two (2) times a day for 7 days. , Normal, Disp-14 Capsule, R-0           CONTINUE these medications which have NOT CHANGED    Details   escitalopram oxalate (LEXAPRO) 10 mg tablet Take 10 mg by mouth daily. , Historical Med      HYDROXYZINE HCL PO Take  by mouth., Historical Med      lidocaine/hydrocortisone ac (lidocaine HCl-hydrocortison ac) 2 %-2 % (7 gram) kit Insert 1 Each into rectum two (2) times daily as needed for Pain., Normal, Disp-1 Each, R-0      polyethylene glycol (Miralax) 17 gram/dose powder Take 17 g by mouth daily. 1 tablespoon with 8 oz of water daily, Normal, Disp-510 g, R-0      ondansetron (ZOFRAN ODT) 4 mg disintegrating tablet Take 1 Tablet by mouth every eight (8) hours as needed for Nausea or Vomiting., Normal, Disp-12 Tablet, R-0      ondansetron hcl (Zofran) 4 mg tablet Take 1 Tablet by mouth every twelve (12) hours as needed for Nausea or Vomiting., Print, Disp-20 Tablet, R-0      sertraline (ZOLOFT) 50 mg tablet Take 1 Tab by mouth daily.  Indications: Anxiousness associated with Depression, Print, Disp-30 Tab, R-0      prenatal multivit-ca-min-fe-fa (PRENATAL VITAMIN) tab Take 1 Tab by mouth daily. , Print, Disp-30 Tab, R-0               DISCONTINUED MEDICATIONS:  Discharge Medication List as of 2/7/2023 11:25 AM          I am the Primary Clinician of Record. Rafi Bender NP (electronically signed)    (Please note that parts of this dictation were completed with voice recognition software. Quite often unanticipated grammatical, syntax, homophones, and other interpretive errors are inadvertently transcribed by the computer software. Please disregards these errors.  Please excuse any errors that have escaped final proofreading.)

## 2023-02-07 NOTE — ED NOTES
Emergency Department Nursing Plan of Care       The Nursing Plan of Care is developed from the Nursing assessment and Emergency Department Attending provider initial evaluation. The plan of care may be reviewed in the ED Provider note.     The Plan of Care was developed with the following considerations:   Patient / Family readiness to learn indicated by:verbalized understanding  Persons(s) to be included in education: patient  Barriers to Learning/Limitations:No    Signed     Trinh Amador RN    2/7/2023   10:26 AM

## 2023-02-08 LAB
BACTERIA SPEC CULT: NORMAL
SERVICE CMNT-IMP: NORMAL

## 2023-04-18 ENCOUNTER — HOSPITAL ENCOUNTER (EMERGENCY)
Age: 28
Discharge: HOME | End: 2023-04-18
Attending: EMERGENCY MEDICINE
Payer: COMMERCIAL

## 2023-04-18 VITALS
HEART RATE: 78 BPM | TEMPERATURE: 98.3 F | RESPIRATION RATE: 16 BRPM | WEIGHT: 169 LBS | HEIGHT: 61 IN | SYSTOLIC BLOOD PRESSURE: 145 MMHG | BODY MASS INDEX: 31.91 KG/M2 | DIASTOLIC BLOOD PRESSURE: 88 MMHG | OXYGEN SATURATION: 100 %

## 2023-04-18 DIAGNOSIS — N30.00 ACUTE CYSTITIS WITHOUT HEMATURIA: ICD-10-CM

## 2023-04-18 DIAGNOSIS — B37.31 CANDIDA VAGINITIS: Primary | ICD-10-CM

## 2023-04-18 DIAGNOSIS — Z76.0 MEDICATION REFILL: ICD-10-CM

## 2023-04-18 LAB
APPEARANCE UR: ABNORMAL
BACTERIA URNS QL MICRO: ABNORMAL /HPF
BILIRUB UR QL: NEGATIVE
CLUE CELLS VAG QL WET PREP: NORMAL
COLOR UR: ABNORMAL
EPITH CASTS URNS QL MICRO: ABNORMAL /LPF
GLUCOSE UR STRIP.AUTO-MCNC: NEGATIVE MG/DL
HCG UR QL: NEGATIVE
HGB UR QL STRIP: NEGATIVE
KETONES UR QL STRIP.AUTO: ABNORMAL MG/DL
KOH PREP SPEC: NORMAL
LEUKOCYTE ESTERASE UR QL STRIP.AUTO: ABNORMAL
NITRITE UR QL STRIP.AUTO: NEGATIVE
PH UR STRIP: 6.5 (ref 5–8)
PROT UR STRIP-MCNC: ABNORMAL MG/DL
RBC #/AREA URNS HPF: ABNORMAL /HPF (ref 0–5)
SERVICE CMNT-IMP: NORMAL
SP GR UR REFRACTOMETRY: 1.02 (ref 1–1.03)
T VAGINALIS VAG QL WET PREP: NORMAL
UA: UC IF INDICATED,UAUC: ABNORMAL
UROBILINOGEN UR QL STRIP.AUTO: 0.2 EU/DL (ref 0.2–1)
WBC URNS QL MICRO: ABNORMAL /HPF (ref 0–4)

## 2023-04-18 PROCEDURE — 81025 URINE PREGNANCY TEST: CPT

## 2023-04-18 PROCEDURE — 87210 SMEAR WET MOUNT SALINE/INK: CPT

## 2023-04-18 PROCEDURE — 87491 CHLMYD TRACH DNA AMP PROBE: CPT

## 2023-04-18 PROCEDURE — 81001 URINALYSIS AUTO W/SCOPE: CPT

## 2023-04-18 PROCEDURE — 99283 EMERGENCY DEPT VISIT LOW MDM: CPT

## 2023-04-18 PROCEDURE — 87186 SC STD MICRODIL/AGAR DIL: CPT

## 2023-04-18 PROCEDURE — 87086 URINE CULTURE/COLONY COUNT: CPT

## 2023-04-18 PROCEDURE — 87077 CULTURE AEROBIC IDENTIFY: CPT

## 2023-04-18 RX ORDER — HYDROXYZINE 25 MG/1
25 TABLET, FILM COATED ORAL
Qty: 12 TABLET | Refills: 0 | Status: SHIPPED | OUTPATIENT
Start: 2023-04-18 | End: 2023-04-28

## 2023-04-18 RX ORDER — MICONAZOLE NITRATE 2 %
1 CREAM WITH APPLICATOR VAGINAL
Qty: 35 G | Refills: 0 | Status: SHIPPED | OUTPATIENT
Start: 2023-04-18 | End: 2023-04-25

## 2023-04-18 RX ORDER — CEPHALEXIN 500 MG/1
500 CAPSULE ORAL 2 TIMES DAILY
Qty: 14 CAPSULE | Refills: 0 | Status: SHIPPED | OUTPATIENT
Start: 2023-04-18 | End: 2023-04-25

## 2023-04-18 RX ORDER — AZITHROMYCIN 500 MG/1
1000 TABLET, FILM COATED ORAL
Status: DISCONTINUED | OUTPATIENT
Start: 2023-04-18 | End: 2023-04-18

## 2023-04-18 RX ORDER — POLYETHYLENE GLYCOL 3350 17 G/17G
17 POWDER, FOR SOLUTION ORAL DAILY
Qty: 510 G | Refills: 0 | Status: SHIPPED | OUTPATIENT
Start: 2023-04-18

## 2023-04-18 RX ORDER — FLUCONAZOLE 150 MG/1
150 TABLET ORAL
Qty: 2 TABLET | Refills: 0 | Status: SHIPPED | OUTPATIENT
Start: 2023-04-18 | End: 2023-04-18

## 2023-04-18 NOTE — DISCHARGE INSTRUCTIONS
It was a pleasure taking care of you at [facility] Emergency Department today. We know that when you come to [facility], you are entrusting us with your health, comfort, and safety. Our physicians and nurses honor that trust, and we truly appreciate the opportunity to care for you and your loved ones. We also value our feedback. If you receive a survey about your Emergency Department experience today, please fill it out. We care about our patients' feedback, and we listen to what you have to say. Thank you!

## 2023-04-18 NOTE — ED NOTES
Pt was discharged by provider, provider advised the patient did not want to be treated, they would cancel the orders.

## 2023-04-18 NOTE — ED NOTES
Emergency Department Nursing Plan of Care       The Nursing Plan of Care is developed from the Nursing assessment and Emergency Department Attending provider initial evaluation. The plan of care may be reviewed in the ED Provider note.     The Plan of Care was developed with the following considerations:   Patient / Family readiness to learn indicated by:verbalized understanding  Persons(s) to be included in education: patient  Barriers to Learning/Limitations:No    Signed     Gwendolyn Light RN    4/18/2023   4:55 PM

## 2023-04-18 NOTE — ED PROVIDER NOTES
[facility] EMERGENCY DEPT  EMERGENCY DEPARTMENT ENCOUNTER       Pt Name: Sujatha Sandoval  MRN: [id number]  [place] 1995  Date of evaluation: 4/18/2023  Provider: Woody Arizmendi NP   PCP: None  Note Started: 5:12 PM 4/18/23     ED attending involment: I have seen and evaluated the patient. My supervision physician was available for consultation. CHIEF COMPLAINT       Chief Complaint   Patient presents with    Vaginal Discharge        HISTORY OF PRESENT ILLNESS: 1 or more elements      History From: Patient  HPI Limitations : None     Sujatha Sandoval is a 33 y.o. female who presents with vaginal discharge. Onset 3 days ago. Associated with itching. Patient is concerned for STDs. States she is sexually active but last intercourse approximately 3 months ago. She also reports history of yeast infection where she did not ever complete the medication due to insurance not covering the medication. Denies changes in soap, lotion, detergent. She is also requesting for refill medication on Lexapro, Zoloft, hydroxyzine, MiraLAX. Patient states her psychiatry appointment is in 2 days and they normally prescribe her antidepressants. Denies SI, HI, delusions, loose Nations. Nursing Notes were all reviewed and agreed with or any disagreements were addressed in the HPI. REVIEW OF SYSTEMS      Review of Systems   Constitutional:  Negative for chills and fever. Respiratory:  Negative for cough and shortness of breath. Gastrointestinal:  Positive for abdominal pain. Negative for nausea and vomiting. Genitourinary:  Positive for vaginal discharge. Negative for dysuria, frequency, genital sores, hematuria, menstrual problem, pelvic pain, urgency, vaginal bleeding and vaginal pain. Skin:  Negative for rash. Neurological:  Negative for dizziness and headaches. All other systems reviewed and are negative. Positives and Pertinent negatives as per HPI.     PAST HISTORY     Past Medical History:  Past Medical History:   Diagnosis Date    Anemia during pregnancy in second trimester 10/1/2019    Chlamydia     Hypertension     Murmur 10/1/2019       Past Surgical History:  History reviewed. No pertinent surgical history. Family History:  History reviewed. No pertinent family history. Social History:  Social History     Tobacco Use    Smoking status: Light Smoker     Packs/day: 0.25     Types: Cigarettes    Smokeless tobacco: Never   Substance Use Topics    Alcohol use: Not Currently    Drug use: No       Allergies:  No Known Allergies    CURRENT MEDICATIONS      Previous Medications    ESCITALOPRAM OXALATE (LEXAPRO) 10 MG TABLET    Take 1 Tablet by mouth daily. HYDROXYZINE HCL PO    Take  by mouth. LIDOCAINE/HYDROCORTISONE AC (LIDOCAINE HCL-HYDROCORTISON AC) 2 %-2 % (7 GRAM) KIT    Insert 1 Each into rectum two (2) times daily as needed for Pain. ONDANSETRON (ZOFRAN ODT) 4 MG DISINTEGRATING TABLET    Take 1 Tablet by mouth every eight (8) hours as needed for Nausea or Vomiting. ONDANSETRON HCL (ZOFRAN) 4 MG TABLET    Take 1 Tablet by mouth every twelve (12) hours as needed for Nausea or Vomiting. POLYETHYLENE GLYCOL (MIRALAX) 17 GRAM/DOSE POWDER    Take 17 g by mouth daily. 1 tablespoon with 8 oz of water daily    PRENATAL MULTIVIT-CA-MIN-FE-FA (PRENATAL VITAMIN) TAB    Take 1 Tab by mouth daily. SERTRALINE (ZOLOFT) 50 MG TABLET    Take 1 Tab by mouth daily. Indications: Anxiousness associated with Depression       PHYSICAL EXAM      ED Triage Vitals [04/18/23 1559]   ED Encounter Vitals Group      BP (!) 145/88      Pulse (Heart Rate) 78      Resp Rate 16      Temp 98.3 °F (36.8 °C)      Temp src       O2 Sat (%) 100 %      Weight 169 lb      Height 5' 1\"        Physical Exam  Vitals and nursing note reviewed. Constitutional:       General: She is not in acute distress. Appearance: She is well-developed. She is not ill-appearing. HENT:      Head: Normocephalic and atraumatic. Cardiovascular:      Rate and Rhythm: Normal rate and regular rhythm. Pulses: Normal pulses. Heart sounds: Normal heart sounds. Pulmonary:      Effort: Pulmonary effort is normal.      Breath sounds: Normal breath sounds. Abdominal:      General: Bowel sounds are normal. There is no distension. Palpations: Abdomen is soft. There is no mass. Tenderness: There is no abdominal tenderness. There is no right CVA tenderness, left CVA tenderness or guarding. Musculoskeletal:      Cervical back: Normal range of motion and neck supple. Skin:     General: Skin is warm and dry. Neurological:      Mental Status: She is alert and oriented to person, place, and time. GCS: GCS eye subscore is 4. GCS verbal subscore is 5. GCS motor subscore is 6. DIAGNOSTIC RESULTS   LABS:     Recent Results (from the past 12 hour(s))   URINALYSIS W/ REFLEX CULTURE    Collection Time: 04/18/23  4:21 PM    Specimen: Urine   Result Value Ref Range    Color YELLOW/STRAW      Appearance CLOUDY (A) CLEAR      Specific gravity 1.020 1.003 - 1.030      pH (UA) 6.5 5.0 - 8.0      Protein TRACE (A) NEG mg/dL    Glucose Negative NEG mg/dL    Ketone TRACE (A) NEG mg/dL    Bilirubin Negative NEG      Blood Negative NEG      Urobilinogen 0.2 0.2 - 1.0 EU/dL    Nitrites Negative NEG      Leukocyte Esterase MODERATE (A) NEG      WBC 10-20 0 - 4 /hpf    RBC 0-5 0 - 5 /hpf    Epithelial cells MODERATE (A) FEW /lpf    Bacteria 1+ (A) NEG /hpf    UA:UC IF INDICATED URINE CULTURE ORDERED (A) CNI     ERNESTO, OTHER SOURCES    Collection Time: 04/18/23  4:21 PM    Specimen: Vagina;  Other   Result Value Ref Range    Special Requests: NO SPECIAL REQUESTS      ERNESTO YEAST WITH PSEUDOHYPHAE     WET PREP    Collection Time: 04/18/23  4:21 PM    Specimen: Miscellaneous sample   Result Value Ref Range    Clue cells CLUE CELLS ABSENT      Wet prep NO TRICHOMONAS SEEN     HCG URINE, QL. - POC    Collection Time: 04/18/23  4:27 PM   Result Value Ref Range    Pregnancy test,urine (POC) Negative NEG             RADIOLOGY:  Non-plain film images such as CT, Ultrasound and MRI are read by the radiologist. Plain radiographic images are visualized and preliminarily interpreted by myself, Rafi Bender NP, ED provider   with the below findings:       Interpretation per the Radiologist below, if available at the time of this note:     No results found. PROCEDURES   Unless otherwise noted below, none  Procedures     EMERGENCY DEPARTMENT COURSE and DIFFERENTIAL DIAGNOSIS/MDM   Vitals:    Vitals:    04/18/23 1559   BP: (!) 145/88   Pulse: 78   Resp: 16   Temp: 98.3 °F (36.8 °C)   SpO2: 100%   Weight: 76.7 kg (169 lb)   Height: 5' 1\" (1.549 m)        Patient was given the following medications:  Medications   cefTRIAXone (ROCEPHIN) 500 mg in lidocaine (PF) (XYLOCAINE) 10 mg/mL (1 %) IM injection (has no administration in time range)   azithromycin (ZITHROMAX) tablet 1,000 mg (has no administration in time range)       CONSULTS: (Who and What was discussed)  None    Chronic Conditions: none    Social Determinants affecting Dx or Tx: None    Records Reviewed (source and summary): Prior medical records, Previous Radiology studies, Previous Laboratory studies, and Nursing notes    MDM (CC/HPI Summary, DDx, ED Course, Reassessment, Disposition Considerations -Tests not done, Shared Decision Making, Pt Expectation of Test or Tx.):     28-year-old female presenting with vaginal discharge exhibiting yeast on exam.  In addition she appears to have leukocytes, WBCs and bacteria concerning for UTI. I suspect her yeast infection never resolved since she never took any medication. Advised we will try Diflucan and trial Monistat to see if insurance will cover those medications. In addition prescription for Keflex for UTI. Advised I will refill MiraLAX and she states she is constipated.   Also I will fill hydroxyzine but she needs to follow-up with her psychiatrist for antidepressant refills. FINAL IMPRESSION     1. Candida vaginitis    2. Acute cystitis without hematuria    3. Medication refill          DISPOSITION/PLAN           Care plan outlined and precautions discus All of pt's questions and concerns were addressed. Patient was instructed and agrees to follow up with PCP, as well as to return to the ED upon further deterioration. Patient is ready to go home. PATIENT REFERRED TO:  Follow-up Information    None           DISCHARGE MEDICATIONS:  Discharge Medication List as of 4/18/2023  5:26 PM        CONTINUE these medications which have CHANGED    Details   polyethylene glycol (Miralax) 17 gram/dose powder Take 17 g by mouth daily. 1 tablespoon with 8 oz of water daily, Normal, Disp-510 g, R-0      hydrOXYzine HCL (ATARAX) 25 mg tablet Take 1 Tablet by mouth every eight (8) hours as needed for Anxiety for up to 10 days. , Normal, Disp-12 Tablet, R-0           CONTINUE these medications which have NOT CHANGED    Details   escitalopram oxalate (LEXAPRO) 10 mg tablet Take 1 Tablet by mouth daily. , Historical Med      lidocaine/hydrocortisone ac (lidocaine HCl-hydrocortison ac) 2 %-2 % (7 gram) kit Insert 1 Each into rectum two (2) times daily as needed for Pain., Normal, Disp-1 Each, R-0      sertraline (ZOLOFT) 50 mg tablet Take 1 Tab by mouth daily. Indications: Anxiousness associated with Depression, Print, Disp-30 Tab, R-0      prenatal multivit-ca-min-fe-fa (PRENATAL VITAMIN) tab Take 1 Tab by mouth daily. , Print, Disp-30 Tab, R-0           STOP taking these medications       ondansetron (ZOFRAN ODT) 4 mg disintegrating tablet Comments:   Reason for Stopping:         ondansetron hcl (Zofran) 4 mg tablet Comments:   Reason for Stopping:                 DISCONTINUED MEDICATIONS:  Current Discharge Medication List          I am the Primary Clinician of Record.      Rafi Bender NP (electronically signed)    (Please note that parts of this dictation were completed with voice recognition software. Quite often unanticipated grammatical, syntax, homophones, and other interpretive errors are inadvertently transcribed by the computer software. Please disregards these errors.  Please excuse any errors that have escaped final proofreading.)

## 2023-04-21 LAB
BACTERIA SPEC CULT: ABNORMAL
BACTERIA SPEC CULT: ABNORMAL
CC UR VC: ABNORMAL
SERVICE CMNT-IMP: ABNORMAL

## 2023-05-19 RX ORDER — POLYETHYLENE GLYCOL 3350 17 G/17G
17 POWDER, FOR SOLUTION ORAL DAILY
COMMUNITY
Start: 2023-04-18

## 2023-05-19 RX ORDER — ESCITALOPRAM OXALATE 10 MG/1
10 TABLET ORAL DAILY
COMMUNITY

## 2023-05-19 RX ORDER — LIDOCAINE HCL AND HYDROCORTISONE ACETATE 20; 20 MG/G; MG/G
1 CREAM RECTAL 2 TIMES DAILY PRN
COMMUNITY
Start: 2022-11-08

## 2023-06-23 ENCOUNTER — HOSPITAL ENCOUNTER (EMERGENCY)
Facility: HOSPITAL | Age: 28
Discharge: HOME | End: 2023-06-23
Payer: COMMERCIAL

## 2023-06-23 VITALS
TEMPERATURE: 98.2 F | WEIGHT: 175 LBS | DIASTOLIC BLOOD PRESSURE: 95 MMHG | BODY MASS INDEX: 33.04 KG/M2 | OXYGEN SATURATION: 100 % | HEART RATE: 70 BPM | SYSTOLIC BLOOD PRESSURE: 154 MMHG | RESPIRATION RATE: 18 BRPM | HEIGHT: 61 IN

## 2023-06-23 DIAGNOSIS — N76.0 VAGINITIS AND VULVOVAGINITIS: Primary | ICD-10-CM

## 2023-06-23 LAB
APPEARANCE UR: CLEAR
BACTERIA URNS QL MICRO: ABNORMAL /HPF
BILIRUB UR QL: NEGATIVE
CLUE CELLS VAG QL WET PREP: NORMAL
COLOR UR: ABNORMAL
EPITH CASTS URNS QL MICRO: ABNORMAL /LPF
GLUCOSE UR STRIP.AUTO-MCNC: NEGATIVE MG/DL
HCG UR QL: NEGATIVE
HGB UR QL STRIP: NEGATIVE
KETONES UR QL STRIP.AUTO: NEGATIVE MG/DL
KOH PREP SPEC: NORMAL
LEUKOCYTE ESTERASE UR QL STRIP.AUTO: ABNORMAL
MUCOUS THREADS URNS QL MICRO: ABNORMAL /LPF
NITRITE UR QL STRIP.AUTO: NEGATIVE
PH UR STRIP: 6 (ref 5–8)
PROT UR STRIP-MCNC: NEGATIVE MG/DL
RBC #/AREA URNS HPF: ABNORMAL /HPF (ref 0–5)
SERVICE CMNT-IMP: NORMAL
SP GR UR REFRACTOMETRY: 1.01
T VAGINALIS VAG QL WET PREP: NORMAL
URINE CULTURE IF INDICATED: ABNORMAL
UROBILINOGEN UR QL STRIP.AUTO: 0.2 EU/DL (ref 0.2–1)
WBC URNS QL MICRO: ABNORMAL /HPF (ref 0–4)

## 2023-06-23 PROCEDURE — 99283 EMERGENCY DEPT VISIT LOW MDM: CPT

## 2023-06-23 PROCEDURE — 87210 SMEAR WET MOUNT SALINE/INK: CPT

## 2023-06-23 PROCEDURE — 81001 URINALYSIS AUTO W/SCOPE: CPT

## 2023-06-23 PROCEDURE — 87491 CHLMYD TRACH DNA AMP PROBE: CPT

## 2023-06-23 PROCEDURE — 87591 N.GONORRHOEAE DNA AMP PROB: CPT

## 2023-06-23 PROCEDURE — 81025 URINE PREGNANCY TEST: CPT

## 2023-06-23 ASSESSMENT — PAIN - FUNCTIONAL ASSESSMENT: PAIN_FUNCTIONAL_ASSESSMENT: NONE - DENIES PAIN

## 2023-06-23 NOTE — ED NOTES
Emergency Department Nursing Plan of Care       The Nursing Plan of Care is developed from the Nursing assessment and Emergency Department Attending provider initial evaluation. The plan of care may be reviewed in the ED Provider note.     The Plan of Care was developed with the following considerations:   Patient / Family readiness to learn indicated by:verbalized understanding  Persons(s) to be included in education: patient  Barriers to Learning/Limitations:None    Signed     Tee Lowe RN    6/23/2023   6:32 PM       Tee Lowe RN  06/23/23 7611

## 2023-06-24 NOTE — ED PROVIDER NOTES
[address] [facility] [address]      Phone: [phone]   terconazole 0.4 % vaginal cream           DISCONTINUED MEDICATIONS:  Discharge Medication List as of 6/23/2023  7:10 PM          {ED Attending Involvement (Optional):87313}    I am the Primary Clinician of Record. TRICIA Haskins NP (electronically signed)    (Please note that parts of this dictation were completed with voice recognition software. Quite often unanticipated grammatical, syntax, homophones, and other interpretive errors are inadvertently transcribed by the computer software. Please disregards these errors.  Please excuse any errors that have escaped final proofreading.)

## 2023-12-25 ENCOUNTER — HOSPITAL ENCOUNTER (EMERGENCY)
Facility: HOSPITAL | Age: 28
Discharge: HOME | End: 2023-12-25
Attending: EMERGENCY MEDICINE
Payer: COMMERCIAL

## 2023-12-25 VITALS
DIASTOLIC BLOOD PRESSURE: 102 MMHG | WEIGHT: 172 LBS | SYSTOLIC BLOOD PRESSURE: 161 MMHG | HEART RATE: 80 BPM | OXYGEN SATURATION: 100 % | BODY MASS INDEX: 32.47 KG/M2 | RESPIRATION RATE: 18 BRPM | HEIGHT: 61 IN | TEMPERATURE: 98.2 F

## 2023-12-25 DIAGNOSIS — R03.0 ELEVATED BLOOD PRESSURE READING WITHOUT DIAGNOSIS OF HYPERTENSION: ICD-10-CM

## 2023-12-25 DIAGNOSIS — B00.1 HERPES LABIALIS: Primary | ICD-10-CM

## 2023-12-25 DIAGNOSIS — Z72.0 TOBACCO ABUSE: ICD-10-CM

## 2023-12-25 DIAGNOSIS — B00.1 COLD SORE: ICD-10-CM

## 2023-12-25 PROCEDURE — 99283 EMERGENCY DEPT VISIT LOW MDM: CPT

## 2023-12-25 RX ORDER — VALACYCLOVIR HYDROCHLORIDE 1 G/1
1000 TABLET, FILM COATED ORAL 2 TIMES DAILY
Qty: 14 TABLET | Refills: 0 | Status: SHIPPED | OUTPATIENT
Start: 2023-12-25 | End: 2024-01-01

## 2023-12-25 RX ORDER — ALLANTION, BENZOCAINE, CAMPHOR, PETROLATUM 10; 200; 30; 649 MG/G; MG/G; MG/G; MG/G
OINTMENT TOPICAL
Qty: 9 G | Refills: 0 | Status: SHIPPED | OUTPATIENT
Start: 2023-12-25

## 2023-12-25 NOTE — ED TRIAGE NOTES
Pt states that she has a rash to the right side of her mouth and possibly her nose that started today.

## 2023-12-25 NOTE — ED PROVIDER NOTES
Cold sore    3. Elevated blood pressure reading without diagnosis of hypertension    4. Tobacco abuse      Attestation:  I am the attending of record for this patient. I personally performed the services described in this documentation on this date, 12/25/2023 for patient, Addi Salas. I have reviewed the chart and verified that the record is accurate and complete.       Dyana Srinivasan MD (Electronic Signature)         Dyana Srinivasan MD  12/25/23 1257

## 2023-12-25 NOTE — DISCHARGE INSTRUCTIONS
Thank You! It was a pleasure taking care of you in our Emergency Department today. We know that when you come to [facility], you are entrusting us with your health, comfort, and safety. Our physicians and nurses honor that trust, and truly appreciate the opportunity to care for you and your loved ones. We also value your feedback. If you receive a survey about your Emergency Department experience today, please fill it out. We care about our patients' feedback, and we listen to what you have to say. Thank you. Dr. Viktoriya Salas M.D.      ____________________________________________________________________  I have included a copy of your lab results and/or radiologic studies from today's visit so you can have them easily available at your follow-up visit. We hope you feel better and please do not hesitate to contact the ED if you have any questions at all! No results found for this or any previous visit (from the past 12 hour(s)). No orders to display     [unfilled]  The exam and treatment you received in the Emergency Department were for an urgent problem and are not intended as complete care. It is important that you follow up with a doctor, nurse practitioner, or physician assistant for ongoing care. If your symptoms become worse or you do not improve as expected and you are unable to reach your usual health care provider, you should return to the Emergency Department. We are available 24 hours a day. Please take your discharge instructions with you when you go to your follow-up appointment. If a prescription has been provided, please have it filled as soon as possible to prevent a delay in treatment. Read the entire medication instruction sheet provided to you by the pharmacy.  If you have any questions or reservations about taking the medication due to side effects or interactions with other medications, please call your primary care physician or contact the ER to

## 2024-01-26 PROCEDURE — 99283 EMERGENCY DEPT VISIT LOW MDM: CPT

## 2024-01-26 ASSESSMENT — PAIN - FUNCTIONAL ASSESSMENT: PAIN_FUNCTIONAL_ASSESSMENT: 0-10

## 2024-01-26 ASSESSMENT — PAIN SCALES - GENERAL: PAINLEVEL_OUTOF10: 10

## 2024-01-26 ASSESSMENT — PAIN DESCRIPTION - LOCATION: LOCATION: HEAD

## 2024-01-27 ENCOUNTER — HOSPITAL ENCOUNTER (EMERGENCY)
Facility: HOSPITAL | Age: 29
Discharge: HOME | End: 2024-01-27
Attending: EMERGENCY MEDICINE
Payer: COMMERCIAL

## 2024-01-27 VITALS
BODY MASS INDEX: 32.66 KG/M2 | DIASTOLIC BLOOD PRESSURE: 79 MMHG | HEART RATE: 73 BPM | WEIGHT: 173 LBS | TEMPERATURE: 97.3 F | OXYGEN SATURATION: 100 % | HEIGHT: 61 IN | SYSTOLIC BLOOD PRESSURE: 132 MMHG | RESPIRATION RATE: 18 BRPM

## 2024-01-27 DIAGNOSIS — G44.209 ACUTE NON INTRACTABLE TENSION-TYPE HEADACHE: Primary | ICD-10-CM

## 2024-01-27 PROCEDURE — 6370000000 HC RX 637 (ALT 250 FOR IP): Performed by: EMERGENCY MEDICINE

## 2024-01-27 RX ORDER — METOCLOPRAMIDE 10 MG/1
10 TABLET ORAL ONCE
Status: COMPLETED | OUTPATIENT
Start: 2024-01-27 | End: 2024-01-27

## 2024-01-27 RX ORDER — BUTALBITAL, ACETAMINOPHEN AND CAFFEINE 300; 40; 50 MG/1; MG/1; MG/1
1 CAPSULE ORAL EVERY 6 HOURS PRN
Qty: 12 CAPSULE | Refills: 0 | Status: SHIPPED | OUTPATIENT
Start: 2024-01-27 | End: 2024-01-30

## 2024-01-27 RX ORDER — PROCHLORPERAZINE MALEATE 10 MG
5 TABLET ORAL ONCE
Status: COMPLETED | OUTPATIENT
Start: 2024-01-27 | End: 2024-01-27

## 2024-01-27 RX ORDER — BUTALBITAL, ACETAMINOPHEN AND CAFFEINE 50; 325; 40 MG/1; MG/1; MG/1
2 TABLET ORAL
Status: COMPLETED | OUTPATIENT
Start: 2024-01-27 | End: 2024-01-27

## 2024-01-27 RX ADMIN — METOCLOPRAMIDE 10 MG: 10 TABLET ORAL at 01:31

## 2024-01-27 RX ADMIN — PROCHLORPERAZINE MALEATE 5 MG: 10 TABLET ORAL at 01:31

## 2024-01-27 RX ADMIN — BUTALBITAL, ACETAMINOPHEN AND CAFFEINE 2 TABLET: 325; 50; 40 TABLET ORAL at 01:31

## 2024-01-27 NOTE — ED TRIAGE NOTES
Pt presents to ED with a headache. Pt reports a 10/10 headache all day. Pt reports she has taken 8 bc powders throughout the day.

## 2024-01-27 NOTE — ED NOTES
Patient  given copy of dc instructions and 1 script(s). Patient verbalized understanding of instructions and script (s). Patient given a current medication reconciliation form and verbalized understanding of their medications. Patient verbalized understanding of the importance of discussing medications with his or her physician or clinic they will be following up with. Patient alert and oriented and in no acute distress. Patient discharged home ambulatory.

## 2024-01-27 NOTE — DISCHARGE INSTRUCTIONS
It was a pleasure taking care of you in our Emergency Department today.  We know that when you come to [facility], you are entrusting us with your health, comfort, and safety.  Our physicians and nurses honor that trust, and truly appreciate the opportunity to care for you and your loved ones.    We also value your feedback.  If you receive a survey about your Emergency Department experience today, please fill it out.  We care about our patients' feedback, and we listen to what you have to say.  Thank you!      Dr. Cierra Garcia MD

## 2024-01-27 NOTE — ED PROVIDER NOTES
[facility] EMERGENCY DEPT  EMERGENCY DEPARTMENT ENCOUNTER         Pt Name: Kindra Ross  MRN: [id number]  Birthdate 1995  Date of evaluation: 1/26/2024  Provider: Cierra Garcia MD   PCP: No primary care provider on file.  Note Started: 2:28 AM 1/27/24     CHIEF COMPLAINT       Chief Complaint   Patient presents with    Headache        HISTORY OF PRESENT ILLNESS: 1 or more elements      History From: Patient  HPI Limitations: None     Kindra Ross is a 28 y.o. female who presents with a tightness-like, dull frontal headache since this morning. Tried bc powder without relief. She has also felt slightly more fatigued. No vision changes or other focal neuro deficits. Headache started gradually and was mild at first. Over course of the morning and into early afternoon intensified and has been constant since. No fever, chills, neck pain/stiffnes. Ros otherwise neg.      Please see more comprehensive history below under MDM  Nursing Notes were all reviewed in real time as they are made available. Any disagreements addressed in the HPI/MDM.     REVIEW OF SYSTEMS      Review of Systems   Constitutional:  Positive for fatigue. Negative for appetite change, chills and fever.   HENT:  Negative for congestion and sinus pressure.    Eyes:  Negative for photophobia, pain and visual disturbance.   Respiratory:  Negative for cough and shortness of breath.    Cardiovascular:  Negative for chest pain.   Gastrointestinal:  Negative for abdominal pain, diarrhea, nausea and vomiting.   Genitourinary:  Negative for dysuria and flank pain.   Musculoskeletal:  Negative for back pain, gait problem and myalgias.   Skin:  Negative for color change.   Neurological:  Positive for headaches. Negative for dizziness, facial asymmetry, speech difficulty, weakness, light-headedness and numbness.   Psychiatric/Behavioral:  Negative for confusion.         Positives and Pertinent negatives as per HPI and MDM.    PAST HISTORY     Past Medical  evidence-based clinical evaluation of the patient and interpretation of available results. Involved patient and/or caregiver in management, treatment options and final disposition. I have explained the importance of close f/u and to return to the ED if symptoms worsen or any concerns arise. Patient/caregiver verbalize understanding of and agreement.      ED Medications Administered  Medications   butalbital-acetaminophen-caffeine (FIORICET, ESGIC) per tablet 2 tablet (2 tablets Oral Given 1/27/24 0131)   metoclopramide (REGLAN) tablet 10 mg (10 mg Oral Given 1/27/24 0131)   prochlorperazine (COMPAZINE) tablet 5 mg (5 mg Oral Given 1/27/24 0131)       ED Orders Placed:  Orders Placed This Encounter   Procedures    Vital Signs (Recheck)    POC Pregnancy Urine Qual       Amount and/or Complexity of Data Reviewed  HIGH complexity decision making performed   Presentation: ACUTE and SEVERE, systemic symptoms, impact on quality of life, morbidity and mortality.  Clinical lab tests: ordered as appropriate, reviewed and interpreted by me   ***Tests in the radiology section of CPT®: ordered as appropriate & images reviewed and interpreted by me  ***Tests in the medicine section of CPT®: ordered as appropriate, reviewed and interpreted by me  ***Reviewed and summarized above results: yes  Review and summarize past medical records: yes  ***Independent visualization of images, ECGs, or specimens: yes  ***Independent history obtained from other sources such as family, EMS, bystanders, law enforcement: yes    Risks  OTC drugs.  Prescription drug management.  Parenteral controlled substances.  Drug therapy requiring intensive monitoring for toxicity.  Decision regarding hospitalization.     FINAL IMPRESSION     1. Acute non intractable tension-type headache          DISPOSITION/PLAN         I am the Primary Clinician of Record.   Cierra Garcia MD (electronically signed)    (Please note that parts of this dictation were completed

## 2024-02-01 ASSESSMENT — ENCOUNTER SYMPTOMS
COLOR CHANGE: 0
PHOTOPHOBIA: 0
ABDOMINAL PAIN: 0
EYE PAIN: 0
SHORTNESS OF BREATH: 0
COUGH: 0
BACK PAIN: 0
VOMITING: 0
NAUSEA: 0
SINUS PRESSURE: 0
DIARRHEA: 0

## 2024-07-14 ENCOUNTER — HOSPITAL ENCOUNTER (EMERGENCY)
Facility: HOSPITAL | Age: 29
Discharge: HOME | End: 2024-07-14
Payer: COMMERCIAL

## 2024-07-14 VITALS
DIASTOLIC BLOOD PRESSURE: 90 MMHG | HEIGHT: 61 IN | RESPIRATION RATE: 18 BRPM | HEART RATE: 80 BPM | SYSTOLIC BLOOD PRESSURE: 139 MMHG | OXYGEN SATURATION: 99 % | BODY MASS INDEX: 33.61 KG/M2 | TEMPERATURE: 98.5 F | WEIGHT: 178 LBS

## 2024-07-14 DIAGNOSIS — N30.01 ACUTE CYSTITIS WITH HEMATURIA: ICD-10-CM

## 2024-07-14 DIAGNOSIS — A59.9 TRICHOMONIASIS: Primary | ICD-10-CM

## 2024-07-14 LAB
APPEARANCE UR: ABNORMAL
BACTERIA URNS QL MICRO: ABNORMAL /HPF
BILIRUB UR QL: NEGATIVE
CLUE CELLS VAG QL WET PREP: ABNORMAL
COLOR UR: ABNORMAL
EPITH CASTS URNS QL MICRO: ABNORMAL /LPF
GLUCOSE UR STRIP.AUTO-MCNC: NEGATIVE MG/DL
HCG UR QL: NEGATIVE
HGB UR QL STRIP: ABNORMAL
KETONES UR QL STRIP.AUTO: ABNORMAL MG/DL
LEUKOCYTE ESTERASE UR QL STRIP.AUTO: ABNORMAL
MUCOUS THREADS URNS QL MICRO: ABNORMAL /LPF
NITRITE UR QL STRIP.AUTO: NEGATIVE
PH UR STRIP: 5.5 (ref 5–8)
PROT UR STRIP-MCNC: 30 MG/DL
RBC #/AREA URNS HPF: ABNORMAL /HPF (ref 0–5)
SP GR UR REFRACTOMETRY: 1.02
T VAGINALIS VAG QL WET PREP: ABNORMAL
TRICHOMONAS UR QL MICRO: PRESENT
URINE CULTURE IF INDICATED: ABNORMAL
UROBILINOGEN UR QL STRIP.AUTO: 1 EU/DL (ref 0.2–1)
WBC URNS QL MICRO: ABNORMAL /HPF (ref 0–4)
YEAST: ABNORMAL

## 2024-07-14 PROCEDURE — 81001 URINALYSIS AUTO W/SCOPE: CPT

## 2024-07-14 PROCEDURE — 6360000002 HC RX W HCPCS: Performed by: NURSE PRACTITIONER

## 2024-07-14 PROCEDURE — 81025 URINE PREGNANCY TEST: CPT

## 2024-07-14 PROCEDURE — 96372 THER/PROPH/DIAG INJ SC/IM: CPT

## 2024-07-14 PROCEDURE — 99284 EMERGENCY DEPT VISIT MOD MDM: CPT

## 2024-07-14 PROCEDURE — 2500000003 HC RX 250 WO HCPCS: Performed by: NURSE PRACTITIONER

## 2024-07-14 PROCEDURE — 87210 SMEAR WET MOUNT SALINE/INK: CPT

## 2024-07-14 PROCEDURE — 87491 CHLMYD TRACH DNA AMP PROBE: CPT

## 2024-07-14 PROCEDURE — 87147 CULTURE TYPE IMMUNOLOGIC: CPT

## 2024-07-14 PROCEDURE — 6370000000 HC RX 637 (ALT 250 FOR IP): Performed by: NURSE PRACTITIONER

## 2024-07-14 PROCEDURE — 87591 N.GONORRHOEAE DNA AMP PROB: CPT

## 2024-07-14 PROCEDURE — 87086 URINE CULTURE/COLONY COUNT: CPT

## 2024-07-14 RX ORDER — AZITHROMYCIN 500 MG/1
1000 TABLET, FILM COATED ORAL
Status: COMPLETED | OUTPATIENT
Start: 2024-07-14 | End: 2024-07-14

## 2024-07-14 RX ORDER — CEPHALEXIN 500 MG/1
500 CAPSULE ORAL 2 TIMES DAILY
Qty: 14 CAPSULE | Refills: 0 | Status: SHIPPED | OUTPATIENT
Start: 2024-07-14 | End: 2024-07-21

## 2024-07-14 RX ORDER — METRONIDAZOLE 500 MG/1
2000 TABLET ORAL ONCE
Status: COMPLETED | OUTPATIENT
Start: 2024-07-14 | End: 2024-07-14

## 2024-07-14 RX ADMIN — LIDOCAINE HYDROCHLORIDE 500 MG: 10 INJECTION, SOLUTION EPIDURAL; INFILTRATION; INTRACAUDAL; PERINEURAL at 22:03

## 2024-07-14 RX ADMIN — AZITHROMYCIN 1000 MG: 500 TABLET, FILM COATED ORAL at 22:01

## 2024-07-14 RX ADMIN — METRONIDAZOLE 2000 MG: 500 TABLET ORAL at 22:01

## 2024-07-14 ASSESSMENT — LIFESTYLE VARIABLES
HOW MANY STANDARD DRINKS CONTAINING ALCOHOL DO YOU HAVE ON A TYPICAL DAY: PATIENT DOES NOT DRINK
HOW OFTEN DO YOU HAVE A DRINK CONTAINING ALCOHOL: NEVER

## 2024-07-14 ASSESSMENT — PAIN SCALES - GENERAL: PAINLEVEL_OUTOF10: 0

## 2024-07-14 ASSESSMENT — PAIN - FUNCTIONAL ASSESSMENT: PAIN_FUNCTIONAL_ASSESSMENT: NONE - DENIES PAIN

## 2024-07-15 NOTE — ED TRIAGE NOTES
Pt presents today with c/o vaginal itching and discharge that has started today.    Pt denies any changes to her soaps or detergents and she denies difficulty urinating.

## 2024-07-15 NOTE — DISCHARGE INSTRUCTIONS
It was a pleasure taking care of you at [facility] Emergency Department today.  We know that when you come to [facility], you are entrusting us with your health, comfort, and safety.  Our physicians and nurses honor that trust, and we truly appreciate the opportunity to care for you and your loved ones.      We also value our feedback.  If you receive a survey about your Emergency Department experience today, please fill it out.  We care about our patients' feedback, and we listen to what you have to say.  Thank you!

## 2024-07-15 NOTE — ED PROVIDER NOTES
Plan to treat with a one-time dose of Flagyl 2 g.  In addition will provide empiric treatment pending gonorrhea chlamydia results.  She also has a UTI and will treat with Keflex.  Asked to strain from sex for 1 week and with partner treatment prior to the sex again.    CLINICAL MANAGEMENT TOOLS:  Not Applicable         FINAL IMPRESSION     1. Trichomoniasis    2. Acute cystitis with hematuria          DISPOSITION/PLAN   DISPOSITION Decision To Discharge 07/14/2024 09:50:06 PM    Discharge Note: The patient is stable for discharge home. The signs, symptoms, diagnosis, and discharge instructions have been discussed, understanding conveyed, and agreed upon. The patient is to follow up as recommended or return to ER should their symptoms worsen.      PATIENT REFERRED TO:  [facility], later [facility]  [address] [place] [address]           DISCHARGE MEDICATIONS:     Medication List        START taking these medications      cephALEXin 500 MG capsule  Commonly known as: Keflex  Take 1 capsule by mouth 2 times daily for 7 days            ASK your doctor about these medications      acetaminophen 325 MG tablet  Commonly known as: TYLENOL  Take 2 tablets by mouth every 6 hours as needed for Pain     anbesol cold sore therapy Oint  Use as directed     benzocaine 20 % Gel mucosal gel  Commonly known as: ORAJEL  Use as directed     butalbital-APAP-caffeine -40 MG Caps per capsule  Commonly known as: Fioricet  Take 1 capsule by mouth every 6 hours as needed for Headaches     escitalopram 10 MG tablet  Commonly known as: LEXAPRO     ibuprofen 600 MG tablet  Commonly known as: ADVIL;MOTRIN  Take 1 tablet by mouth every 8 hours as needed for Pain     Lidocaine-Hydrocortisone Ace 2-2 % Kit     polyethylene glycol 17 GM/SCOOP powder  Commonly known as: GLYCOLAX     sertraline 50 MG tablet  Commonly known as: ZOLOFT               Where to Get Your Medications        These [id number] -

## 2024-07-15 NOTE — ED NOTES
Pt presents to ED ambulatory complaining of Vaginal discharge and itching. Pt states discharge is a yellow color. Pt denies any lesions to the area. Pt is alert and oriented x 4, RR even and unlabored, skin  intact. Assessment completed and pt updated on plan of care.  Call bell in reach.       Emergency Department Nursing Plan of Care       The Nursing Plan of Care is developed from the Nursing assessment and Emergency Department Attending provider initial evaluation.  The plan of care may be reviewed in the “ED Provider note”.    The Plan of Care was developed with the following considerations:   Patient / Family readiness to learn indicated by:verbalized understanding  Persons(s) to be included in education: patient  Barriers to Learning/Limitations:No    Signed

## 2024-07-16 LAB
BACTERIA SPEC CULT: ABNORMAL
CC UR VC: ABNORMAL
SERVICE CMNT-IMP: ABNORMAL

## 2024-11-15 ENCOUNTER — HOSPITAL ENCOUNTER (EMERGENCY)
Facility: HOSPITAL | Age: 29
Discharge: HOME | End: 2024-11-15
Payer: COMMERCIAL

## 2024-11-15 VITALS
RESPIRATION RATE: 18 BRPM | HEIGHT: 61 IN | SYSTOLIC BLOOD PRESSURE: 165 MMHG | OXYGEN SATURATION: 100 % | BODY MASS INDEX: 35.3 KG/M2 | DIASTOLIC BLOOD PRESSURE: 100 MMHG | WEIGHT: 187 LBS | TEMPERATURE: 97.7 F | HEART RATE: 78 BPM

## 2024-11-15 DIAGNOSIS — L03.119 CELLULITIS OF THIGH: Primary | ICD-10-CM

## 2024-11-15 DIAGNOSIS — R51.9 ACUTE NONINTRACTABLE HEADACHE, UNSPECIFIED HEADACHE TYPE: ICD-10-CM

## 2024-11-15 PROCEDURE — 6360000002 HC RX W HCPCS: Performed by: PHYSICIAN ASSISTANT

## 2024-11-15 PROCEDURE — 99284 EMERGENCY DEPT VISIT MOD MDM: CPT

## 2024-11-15 PROCEDURE — 6370000000 HC RX 637 (ALT 250 FOR IP): Performed by: PHYSICIAN ASSISTANT

## 2024-11-15 PROCEDURE — 96372 THER/PROPH/DIAG INJ SC/IM: CPT

## 2024-11-15 RX ORDER — IBUPROFEN 800 MG/1
800 TABLET, FILM COATED ORAL 3 TIMES DAILY PRN
Qty: 20 TABLET | Refills: 0 | Status: SHIPPED | OUTPATIENT
Start: 2024-11-15

## 2024-11-15 RX ORDER — BUTALBITAL, ACETAMINOPHEN AND CAFFEINE 50; 325; 40 MG/1; MG/1; MG/1
1 TABLET ORAL EVERY 4 HOURS PRN
Qty: 15 TABLET | Refills: 0 | Status: SHIPPED | OUTPATIENT
Start: 2024-11-15

## 2024-11-15 RX ORDER — SULFAMETHOXAZOLE AND TRIMETHOPRIM 800; 160 MG/1; MG/1
1 TABLET ORAL 2 TIMES DAILY
Qty: 14 TABLET | Refills: 0 | Status: SHIPPED | OUTPATIENT
Start: 2024-11-15 | End: 2024-11-22

## 2024-11-15 RX ORDER — BUTALBITAL, ACETAMINOPHEN AND CAFFEINE 50; 325; 40 MG/1; MG/1; MG/1
1 TABLET ORAL
Status: COMPLETED | OUTPATIENT
Start: 2024-11-15 | End: 2024-11-15

## 2024-11-15 RX ORDER — SULFAMETHOXAZOLE AND TRIMETHOPRIM 800; 160 MG/1; MG/1
1 TABLET ORAL
Status: COMPLETED | OUTPATIENT
Start: 2024-11-15 | End: 2024-11-15

## 2024-11-15 RX ORDER — KETOROLAC TROMETHAMINE 30 MG/ML
30 INJECTION, SOLUTION INTRAMUSCULAR; INTRAVENOUS ONCE
Status: COMPLETED | OUTPATIENT
Start: 2024-11-15 | End: 2024-11-15

## 2024-11-15 RX ADMIN — SULFAMETHOXAZOLE AND TRIMETHOPRIM 1 TABLET: 800; 160 TABLET ORAL at 16:54

## 2024-11-15 RX ADMIN — BUTALBITAL, ACETAMINOPHEN AND CAFFEINE 1 TABLET: 325; 50; 40 TABLET ORAL at 16:54

## 2024-11-15 RX ADMIN — KETOROLAC TROMETHAMINE 30 MG: 30 INJECTION, SOLUTION INTRAMUSCULAR at 16:55

## 2024-11-15 ASSESSMENT — LIFESTYLE VARIABLES
HOW OFTEN DO YOU HAVE A DRINK CONTAINING ALCOHOL: NEVER
HOW MANY STANDARD DRINKS CONTAINING ALCOHOL DO YOU HAVE ON A TYPICAL DAY: PATIENT DOES NOT DRINK

## 2024-11-15 ASSESSMENT — PAIN - FUNCTIONAL ASSESSMENT: PAIN_FUNCTIONAL_ASSESSMENT: 0-10

## 2024-11-15 ASSESSMENT — PAIN DESCRIPTION - LOCATION
LOCATION: HEAD;LEG
LOCATION: LEG

## 2024-11-15 ASSESSMENT — PAIN DESCRIPTION - ORIENTATION: ORIENTATION: LEFT

## 2024-11-15 ASSESSMENT — PAIN SCALES - GENERAL
PAINLEVEL_OUTOF10: 10
PAINLEVEL_OUTOF10: 10

## 2024-11-15 NOTE — ED NOTES
Pt presents ambulatory to ED complaining of insect bite to left lateral upper leg. Pt reports popping it previously and having some pus drain but it has since scabbed over. Pt reports the pain is causing her a 10/10 headache. Pt reports taking OTC pain relievers without relief. Pt requesting bite to be drained and antibiotics. Pt is alert and oriented x 4, RR even and unlabored, skin is warm and dry. Redness noted surrounding bug bite. Skin is taut. Assessment completed and pt updated on plan of care.       Emergency Department Nursing Plan of Care       The Nursing Plan of Care is developed from the Nursing assessment and Emergency Department Attending provider initial evaluation.  The plan of care may be reviewed in the “ED Provider note”.    The Plan of Care was developed with the following considerations:   Patient / Family readiness to learn indicated by:verbalized understanding  Persons(s) to be included in education: patient  Barriers to Learning/Limitations:None    Signed     Valerie Hodge RN    11/15/2024   4:32 PM

## 2024-11-15 NOTE — ED PROVIDER NOTES
to the ED upon further deterioration. Patient is ready to go home.      PATIENT REFERRED TO:  [facility]  [address] [address] [phone]        [facility]  [address] [facility] [address]  [phone]  Schedule an appointment as soon as possible for a visit   As needed    [facility] EMERGENCY DEPT  [address] [place] [id number] [phone]    If symptoms worsen       DISCHARGE MEDICATIONS:     Medication List        START taking these medications      butalbital-acetaminophen-caffeine -40 MG per tablet  Commonly known as: FIORICET, ESGIC  Take 1 tablet by mouth every 4 hours as needed for Headaches     ibuprofen 800 MG tablet  Commonly known as: ADVIL;MOTRIN  Take 1 tablet by mouth 3 times daily as needed for Pain     sulfamethoxazole-trimethoprim 800-160 MG per tablet  Commonly known as: BACTRIM DS;SEPTRA DS  Take 1 tablet by mouth 2 times daily for 7 days            ASK your doctor about these medications      acetaminophen 325 MG tablet  Commonly known as: TYLENOL  Take 2 tablets by mouth every 6 hours as needed for Pain     anbesol cold sore therapy Oint  Use as directed     benzocaine 20 % Gel mucosal gel  Commonly known as: ORAJEL  Use as directed               Where to Get Your Medications        These medications were sent to [id number] - [place], [place] - [address] [place] [address] - P [phone] - F [phone] [address] [facility] [address]      Phone: [phone]   butalbital-acetaminophen-caffeine -40 MG per tablet  ibuprofen 800 MG tablet  sulfamethoxazole-trimethoprim 800-160 MG per tablet           DISCONTINUED MEDICATIONS:  Discharge Medication List as of 11/15/2024  4:58 PM        STOP taking these medications       butalbital-APAP-caffeine (FIORICET) -40 MG CAPS per capsule Comments:   Reason for Stopping:         escitalopram (LEXAPRO) 10 MG tablet Comments:   Reason for Stopping:

## 2024-11-15 NOTE — ED NOTES
Discharge instructions were given to the patient by Valerie WORLEY.     The patient left the Emergency Department alert and oriented and in no acute distress with 3 prescriptions. The patient was encouraged to call or return to the ED for worsening issues or problems and was encouraged to schedule a follow up appointment for continuing care.     Ambulation assessment completed before discharge.  Pt left Emergency Department ambulating at baseline with no ortho devices  Ortho device education: none    The patient verbalized understanding of discharge instructions and prescriptions, all questions were answered. The patient has no further concerns at this time.

## 2024-11-15 NOTE — ED TRIAGE NOTES
TRIAGE NOTE:  Patient here with c/o insect bite to left leg.  Patient states she suspects it was a spider, patient states she has popped it several times to drain pus, but states that she now has fear of infection, reporting increased pain and some redness surrounding the area.

## 2025-01-13 ENCOUNTER — HOSPITAL ENCOUNTER (EMERGENCY)
Facility: HOSPITAL | Age: 30
Discharge: HOME | End: 2025-01-13
Attending: EMERGENCY MEDICINE
Payer: COMMERCIAL

## 2025-01-13 VITALS
OXYGEN SATURATION: 100 % | HEIGHT: 61 IN | DIASTOLIC BLOOD PRESSURE: 99 MMHG | HEART RATE: 77 BPM | BODY MASS INDEX: 33.61 KG/M2 | SYSTOLIC BLOOD PRESSURE: 165 MMHG | RESPIRATION RATE: 18 BRPM | WEIGHT: 178 LBS | TEMPERATURE: 99.1 F

## 2025-01-13 DIAGNOSIS — F32.A DEPRESSION, UNSPECIFIED DEPRESSION TYPE: ICD-10-CM

## 2025-01-13 DIAGNOSIS — F41.1 ANXIETY STATE: Primary | ICD-10-CM

## 2025-01-13 DIAGNOSIS — Z76.0 ENCOUNTER FOR MEDICATION REFILL: ICD-10-CM

## 2025-01-13 PROCEDURE — 99283 EMERGENCY DEPT VISIT LOW MDM: CPT

## 2025-01-13 PROCEDURE — 6370000000 HC RX 637 (ALT 250 FOR IP): Performed by: EMERGENCY MEDICINE

## 2025-01-13 RX ORDER — ESCITALOPRAM OXALATE 20 MG/1
20 TABLET ORAL DAILY
Qty: 60 TABLET | Refills: 3 | Status: SHIPPED | OUTPATIENT
Start: 2025-01-13

## 2025-01-13 RX ORDER — ALPRAZOLAM 0.25 MG/1
0.25 TABLET ORAL
Status: COMPLETED | OUTPATIENT
Start: 2025-01-13 | End: 2025-01-13

## 2025-01-13 RX ORDER — ALPRAZOLAM 0.25 MG/1
0.25 TABLET ORAL NIGHTLY PRN
Qty: 4 TABLET | Refills: 0 | Status: SHIPPED | OUTPATIENT
Start: 2025-01-13 | End: 2025-02-12

## 2025-01-13 RX ADMIN — ALPRAZOLAM 0.25 MG: 0.25 TABLET ORAL at 17:56

## 2025-01-13 ASSESSMENT — PAIN - FUNCTIONAL ASSESSMENT: PAIN_FUNCTIONAL_ASSESSMENT: NONE - DENIES PAIN

## 2025-01-13 NOTE — ED NOTES
Discharge instructions were given to the patient by Safia WORLEY. The patient left the Emergency Department ambulatory, alert and oriented and in no acute distress with 2 prescriptions. The patient was encouraged to call or return to the ED for worsening issues or problems and was encouraged to schedule a follow up appointment for continuing care. The patient verbalized understanding of discharge instructions and prescriptions, all questions were answered. The patient has no further concerns at this time.

## 2025-01-13 NOTE — ED PROVIDER NOTES
[facility] EMERGENCY DEPARTMENT  EMERGENCY DEPARTMENT ENCOUNTER       Pt Name: Kindra Ross  MRN: [id number]  Birthdate 1995  Date of evaluation: 1/13/2025  Provider: Shruthi Meyers MD   PCP: No primary care provider on file.  Note Started: 9:49 PM 1/13/25     (Please note that parts of this dictation were completed with voice recognition software. Quite often unanticipated grammatical, syntax, homophones, and other interpretive errors are inadvertently transcribed by the computer software. Please disregards these errors. Please excuse any errors that have escaped final proofreading.)    CHIEF COMPLAINT       Chief Complaint   Patient presents with    Paranoid     With anxiety         HISTORY OF PRESENT ILLNESS: 1 or more elements      History From: patient, History limited by:  none     Kindra Ross is a 29 y.o. female who presents with chief complaint of anxiety this whole past weekend.  She has a history of social anxiety and depression.  She has been on Lexapro previously but she is not currently taking it.  She states she has contacted her psychiatrist and is waiting for an appointment to get seen and restarted.  She was told that in the interim if she feels too anxious that she should go to the ED.  Patient states today she did not want to leave the house that she stayed in the house all day.  States she is scared to leave the house because she feels like people are watching her and pain attention to her.  She denies auditory or visual hallucinations.  She denies homicidal or suicidal ideation.  She does know her Lexapro dose.  States she was on 25 mg daily     Nursing Notes were all reviewed and agreed with or any disagreements were addressed in the HPI.     REVIEW OF SYSTEMS        Positives and Pertinent negatives as per HPI.    PAST HISTORY     Past Medical History:  Past Medical History:   Diagnosis Date    Anemia during pregnancy in second trimester 10/1/2019    Chlamydia     Hypertension

## 2025-01-13 NOTE — ED NOTES
Pt presents to ED ambulatory complaining of increased anxiety. Pt states she fears the people in the waiting room. Pt has not been able to take anxiety medication. Pt does not have PCP . Pt looking for behavioral health resources.  Pt is alert and oriented x 4, RR even and unlabored, skin is warm and dry. Assessment completed and pt updated on plan of care.  Call bell in reach.    Patient reports fear of the people in the wait room. Patient states she is not leaving but going to step outside for the cold air to help anxiety.       Emergency Department Nursing Plan of Care       The Nursing Plan of Care is developed from the Nursing assessment and Emergency Department Attending provider initial evaluation.  The plan of care may be reviewed in the “ED Provider note”.    The Plan of Care was developed with the following considerations:   Patient / Family readiness to learn indicated by:verbalized understanding  Persons(s) to be included in education: patient  Barriers to Learning/Limitations:None    Signed

## 2025-03-28 ENCOUNTER — HOSPITAL ENCOUNTER (INPATIENT)
Facility: HOSPITAL | Age: 30
LOS: 1 days | Discharge: PSYCHIATRIC OR SUBSTANCE USE FACILITY | DRG: 760 | End: 2025-03-29
Attending: EMERGENCY MEDICINE | Admitting: STUDENT IN AN ORGANIZED HEALTH CARE EDUCATION/TRAINING PROGRAM
Payer: COMMERCIAL

## 2025-03-28 DIAGNOSIS — Z34.90 EARLY STAGE OF PREGNANCY: ICD-10-CM

## 2025-03-28 DIAGNOSIS — F22 PARANOIA (HCC): Primary | ICD-10-CM

## 2025-03-28 PROBLEM — F29 PSYCHOSIS, UNSPECIFIED PSYCHOSIS TYPE (HCC): Status: ACTIVE | Noted: 2025-03-28

## 2025-03-28 LAB
ALBUMIN SERPL-MCNC: 3.8 G/DL (ref 3.5–5)
ALBUMIN/GLOB SERPL: 1.1 (ref 1.1–2.2)
ALP SERPL-CCNC: 31 U/L (ref 45–117)
ALT SERPL-CCNC: 12 U/L (ref 12–78)
AMPHET UR QL SCN: NEGATIVE
ANION GAP SERPL CALC-SCNC: 6 MMOL/L (ref 2–12)
APAP SERPL-MCNC: <2 UG/ML (ref 10–30)
APPEARANCE UR: ABNORMAL
AST SERPL-CCNC: 16 U/L (ref 15–37)
BACTERIA URNS QL MICRO: ABNORMAL /HPF
BARBITURATES UR QL SCN: NEGATIVE
BASOPHILS # BLD: 0.02 K/UL (ref 0–0.1)
BASOPHILS NFR BLD: 0.5 % (ref 0–1)
BENZODIAZ UR QL: NEGATIVE
BILIRUB SERPL-MCNC: 0.2 MG/DL (ref 0.2–1)
BILIRUB UR QL: NEGATIVE
BUN SERPL-MCNC: 8 MG/DL (ref 6–20)
BUN/CREAT SERPL: 11 (ref 12–20)
CALCIUM SERPL-MCNC: 9.5 MG/DL (ref 8.5–10.1)
CANNABINOIDS UR QL SCN: NEGATIVE
CHLORIDE SERPL-SCNC: 107 MMOL/L (ref 97–108)
CO2 SERPL-SCNC: 26 MMOL/L (ref 21–32)
COCAINE UR QL SCN: NEGATIVE
COLOR UR: ABNORMAL
COMMENT:: NORMAL
CREAT SERPL-MCNC: 0.71 MG/DL (ref 0.55–1.02)
DIFFERENTIAL METHOD BLD: ABNORMAL
EOSINOPHIL # BLD: 0.14 K/UL (ref 0–0.4)
EOSINOPHIL NFR BLD: 3.5 % (ref 0–7)
EPITH CASTS URNS QL MICRO: ABNORMAL /LPF
ERYTHROCYTE [DISTWIDTH] IN BLOOD BY AUTOMATED COUNT: 13.5 % (ref 11.5–14.5)
ETHANOL SERPL-MCNC: <10 MG/DL (ref 0–0.08)
GLOBULIN SER CALC-MCNC: 3.5 G/DL (ref 2–4)
GLUCOSE SERPL-MCNC: 92 MG/DL (ref 65–100)
GLUCOSE UR STRIP.AUTO-MCNC: NEGATIVE MG/DL
HCG SERPL-ACNC: 150 MIU/ML (ref 0–6)
HCT VFR BLD AUTO: 35 % (ref 35–47)
HGB BLD-MCNC: 11.5 G/DL (ref 11.5–16)
HGB UR QL STRIP: NEGATIVE
HYALINE CASTS URNS QL MICRO: ABNORMAL /LPF (ref 0–5)
IMM GRANULOCYTES # BLD AUTO: 0.01 K/UL (ref 0–0.04)
IMM GRANULOCYTES NFR BLD AUTO: 0.2 % (ref 0–0.5)
KETONES UR QL STRIP.AUTO: ABNORMAL MG/DL
LEUKOCYTE ESTERASE UR QL STRIP.AUTO: NEGATIVE
LYMPHOCYTES # BLD: 1.87 K/UL (ref 0.8–3.5)
LYMPHOCYTES NFR BLD: 46.6 % (ref 12–49)
Lab: NORMAL
MCH RBC QN AUTO: 28.8 PG (ref 26–34)
MCHC RBC AUTO-ENTMCNC: 32.9 G/DL (ref 30–36.5)
MCV RBC AUTO: 87.7 FL (ref 80–99)
METHADONE UR QL: NEGATIVE
MONOCYTES # BLD: 0.28 K/UL (ref 0–1)
MONOCYTES NFR BLD: 7 % (ref 5–13)
NEUTS SEG # BLD: 1.69 K/UL (ref 1.8–8)
NEUTS SEG NFR BLD: 42.2 % (ref 32–75)
NITRITE UR QL STRIP.AUTO: NEGATIVE
NRBC # BLD: 0 K/UL (ref 0–0.01)
NRBC BLD-RTO: 0 PER 100 WBC
OPIATES UR QL: NEGATIVE
PCP UR QL: NEGATIVE
PH UR STRIP: 8 (ref 5–8)
PLATELET # BLD AUTO: 214 K/UL (ref 150–400)
PMV BLD AUTO: 10.9 FL (ref 8.9–12.9)
POTASSIUM SERPL-SCNC: 4 MMOL/L (ref 3.5–5.1)
PROT SERPL-MCNC: 7.3 G/DL (ref 6.4–8.2)
PROT UR STRIP-MCNC: 30 MG/DL
RBC # BLD AUTO: 3.99 M/UL (ref 3.8–5.2)
RBC #/AREA URNS HPF: ABNORMAL /HPF (ref 0–5)
SALICYLATES SERPL-MCNC: <1.7 MG/DL (ref 2.8–20)
SODIUM SERPL-SCNC: 139 MMOL/L (ref 136–145)
SP GR UR REFRACTOMETRY: 1.02 (ref 1–1.03)
SPECIMEN HOLD: NORMAL
URINE CULTURE IF INDICATED: ABNORMAL
UROBILINOGEN UR QL STRIP.AUTO: 0.2 EU/DL (ref 0.2–1)
WBC # BLD AUTO: 4 K/UL (ref 3.6–11)
WBC URNS QL MICRO: ABNORMAL /HPF (ref 0–4)

## 2025-03-28 PROCEDURE — 84702 CHORIONIC GONADOTROPIN TEST: CPT

## 2025-03-28 PROCEDURE — 81001 URINALYSIS AUTO W/SCOPE: CPT

## 2025-03-28 PROCEDURE — 80179 DRUG ASSAY SALICYLATE: CPT

## 2025-03-28 PROCEDURE — 99285 EMERGENCY DEPT VISIT HI MDM: CPT

## 2025-03-28 PROCEDURE — 1100000000 HC RM PRIVATE

## 2025-03-28 PROCEDURE — 6370000000 HC RX 637 (ALT 250 FOR IP): Performed by: EMERGENCY MEDICINE

## 2025-03-28 PROCEDURE — 82077 ASSAY SPEC XCP UR&BREATH IA: CPT

## 2025-03-28 PROCEDURE — 80307 DRUG TEST PRSMV CHEM ANLYZR: CPT

## 2025-03-28 PROCEDURE — 90792 PSYCH DIAG EVAL W/MED SRVCS: CPT | Performed by: NURSE PRACTITIONER

## 2025-03-28 PROCEDURE — 80143 DRUG ASSAY ACETAMINOPHEN: CPT

## 2025-03-28 PROCEDURE — 85025 COMPLETE CBC W/AUTO DIFF WBC: CPT

## 2025-03-28 PROCEDURE — 80053 COMPREHEN METABOLIC PANEL: CPT

## 2025-03-28 RX ORDER — HYDROXYZINE HYDROCHLORIDE 25 MG/1
25 TABLET, FILM COATED ORAL ONCE
Status: COMPLETED | OUTPATIENT
Start: 2025-03-28 | End: 2025-03-28

## 2025-03-28 RX ADMIN — HYDROXYZINE HYDROCHLORIDE 25 MG: 25 TABLET, FILM COATED ORAL at 23:28

## 2025-03-28 ASSESSMENT — PAIN - FUNCTIONAL ASSESSMENT: PAIN_FUNCTIONAL_ASSESSMENT: NONE - DENIES PAIN

## 2025-03-28 NOTE — ED TRIAGE NOTES
Patient arrives to ED reports significant paranoid.  \"I came to get checked into the mental mosher\"     Denies SI/HI or hallucinations.

## 2025-03-28 NOTE — BSMART NOTE
Bsmart aware of consult and will await tele psychiatry for disposition to determine plan of care.

## 2025-03-28 NOTE — VIRTUAL HEALTH
Consults     Received request for Telepsychiatry evaluation.    Please obtain medical screening labs to guide final recommendations, our team will continue to follow and will notify ER to arrange consult when additional data is available    Please obtain UDS and Alcohol level.  Thank you.    --Sridhar Lazaro, TRICIA - CNP on 3/28/2025 at 6:01 PM    An electronic signature was used to authenticate this note.

## 2025-03-28 NOTE — VIRTUAL HEALTH
Kindra Ross, was evaluated through a synchronous (real-time) audio-video encounter. The patient (and/or guardian if applicable) is aware that this is a billable service, which includes applicable co-pays. This virtual visit was conducted with patient's (and/or legal guardian's) consent. Patient identification was verified, and a caregiver was present when appropriate.  The patient was located at Facility (Appt Department): [facility] EMERGENCY DEPARTMENT  [address] [place] [address]  Loc: [phone]  The provider was located at Home (City/State): [place]  Confirm you are appropriately licensed, registered, or certified to deliver care in the state where the patient is located as indicated above. If you are not or unsure, please re-schedule the visit: Yes, I confirm.   [place] Consult to Tele-Psych  Consult performed by: Sridhar Lazaro, TRICIA - CNP  Consult ordered by: Sy Frazier PA-C Khavonda Epps  [id number]  1995     EMERGENCY DEPARTMENT TELEPSYCHIATRY EVALUATION    03/28/25    Chief Complaint: Paranoia    Per Record:   Patient arrives to ED reports significant paranoid.  \"I came to get checked into the mental mos[facility]\"   Denies SI/HI or hallucinations.    presents emergency room with reports of being paranoid. Ddx: paranoia needing admission, paranoia needing outpatient follow-up, intoxication, drug use, and others. Physical examination shows unremarkable cardiopulmonary examination. Vitals are stable with elevated blood pressure reading. Patient is in no acute distress during my evaluation. Patient does seem paranoid while looking around in her affect is flat. Patient initially walked into triage and repeating \"Glory to my God\" on repeat while clutching 2 books and her prayer rug.     HPI:   Patient is a 30 y.o.  female who presents for paranoia. Patient presented to the ED on 03/28/25 from home with grandmother.  Grandmother is not at bedside.   self-care/ADLs, Active psychosis, Medication noncompliance, and No collateral information to support safety    C-SSRS Score       3/28/2025     5:39 PM   C-SSRS Suicide Screening   1) Within the past month, have you wished you were dead or wished you could go to sleep and not wake up?  No   2) Have you actually had any thoughts of killing yourself?  No   6) Have you ever done anything, started to do anything, or prepared to do anything to end your life? No    :      Overall Level Suicide Risk: TelePsych CSSRS Risk Level: Low Risk    Assessment:   Patient is a 30 y.o.  female who presents for paranoia.  Patient has poor insight and poor judgment.  She denies depression, SI, HI, AH, and VH.  Patient has anxiety related to her paranoid delusions.  Patient has delusions of thought broadcasting and believes other people can hear her thoughts.  Patient was guarded around the subject.  Patient has rapid/pressured speech and circumstantial/tangential thinking.  Patient's grandmother, Analia Felipe, reports that the patient has a hard time functioning doing everyday things around the house.  She reports that she has poor hygiene and is not able to fully take care of the house or that her children.  Analia reports that the patient cannot even leave the house to go to a playground with her children.  Patient's paranoia and delusional thinking is significantly and negatively affecting her every day life and affecting other aspects of her life as well including being able to keep a job.  Patient is in active state of psychosis and requires inpatient psychiatric admission for safety and stabilization.    Dx:   Psychosis, unspecified  Unspecified mood disorder    Plan:  Inpatient psychiatric admission at appropriate care level facility, once medically cleared and stable  Legal Status: VOLUNTARY.  Medical co-morbidities: Management per medical providers, appreciate assistance.  Reviewed treatment plan with

## 2025-03-28 NOTE — ED PROVIDER NOTES
[facility] EMERGENCY DEPARTMENT  EMERGENCY DEPARTMENT ENCOUNTER      Pt Name: Kindra Ross  MRN: [id number]  Birthdate 1995  Date of evaluation: 3/28/2025  Provider: Sy Frazier PA-C    CHIEF COMPLAINT       Chief Complaint   Patient presents with    Mental Health Problem         HISTORY OF PRESENT ILLNESS    Patient is a 30-year-old female with history of hypertension, chlamydia, anemia during pregnancy, heart murmur who presents emergency room with reports of being paranoid.  Patient reports \" I came to get checked into the mental mosher.\".  Patient reports she has been admitted in the past, but unsure when her last admission was.  Patient reports paranoid about other people and things.  Patient reports no SI or HI.  No hallucinations. Patient denies chest pain, shortness of breath, abdominal pain, urinary symptoms, nausea or vomiting, diarrhea or constipation, headache, dizziness, lightheadedness, fever or chills.  Patient denies any alcohol use, denies smoking/vaping or illicit drug use.     Patient here with grandmother who she currently lives with.           Nursing Notes were reviewed.    REVIEW OF SYSTEMS       Review of Systems      PAST MEDICAL HISTORY     Past Medical History:   Diagnosis Date    Anemia during pregnancy in second trimester 10/1/2019    Chlamydia     Hypertension     Murmur 10/1/2019         SURGICAL HISTORY     No past surgical history on file.      CURRENT MEDICATIONS       Previous Medications    ACETAMINOPHEN (TYLENOL) 325 MG TABLET    Take 2 tablets by mouth every 6 hours as needed for Pain    BENZOCAINE (ORAJEL) 20 % GEL MUCOSAL GEL    Use as directed    BUTALBITAL-ACETAMINOPHEN-CAFFEINE (FIORICET, ESGIC) -40 MG PER TABLET    Take 1 tablet by mouth every 4 hours as needed for Headaches    COLD SORE PRODUCTS (ANBESOL COLD SORE THERAPY) OINT    Use as directed    ESCITALOPRAM (LEXAPRO) 20 MG TABLET    Take 1 tablet by mouth daily    IBUPROFEN (ADVIL;MOTRIN) 800 MG TABLET

## 2025-03-29 ENCOUNTER — HOSPITAL ENCOUNTER (INPATIENT)
Facility: HOSPITAL | Age: 30
LOS: 4 days | Discharge: HOME | DRG: 566 | End: 2025-04-02
Attending: PSYCHIATRY & NEUROLOGY | Admitting: PSYCHIATRY & NEUROLOGY
Payer: COMMERCIAL

## 2025-03-29 VITALS
SYSTOLIC BLOOD PRESSURE: 127 MMHG | TEMPERATURE: 99 F | BODY MASS INDEX: 32.47 KG/M2 | WEIGHT: 171.96 LBS | OXYGEN SATURATION: 99 % | DIASTOLIC BLOOD PRESSURE: 82 MMHG | HEART RATE: 91 BPM | HEIGHT: 61 IN | RESPIRATION RATE: 14 BRPM

## 2025-03-29 PROBLEM — F23 ACUTE PSYCHOSIS (HCC): Status: ACTIVE | Noted: 2025-03-29

## 2025-03-29 PROCEDURE — 1240000000 HC EMOTIONAL WELLNESS R&B

## 2025-03-29 PROCEDURE — 6370000000 HC RX 637 (ALT 250 FOR IP): Performed by: PSYCHIATRY & NEUROLOGY

## 2025-03-29 RX ORDER — DIPHENHYDRAMINE HCL 25 MG
25 CAPSULE ORAL NIGHTLY PRN
Status: DISCONTINUED | OUTPATIENT
Start: 2025-03-29 | End: 2025-03-30

## 2025-03-29 RX ORDER — ACETAMINOPHEN 325 MG/1
650 TABLET ORAL EVERY 4 HOURS PRN
Status: DISCONTINUED | OUTPATIENT
Start: 2025-03-29 | End: 2025-04-02 | Stop reason: HOSPADM

## 2025-03-29 RX ORDER — LURASIDONE HYDROCHLORIDE 20 MG/1
20 TABLET, FILM COATED ORAL ONCE
Status: COMPLETED | OUTPATIENT
Start: 2025-03-29 | End: 2025-03-29

## 2025-03-29 RX ORDER — DIPHENHYDRAMINE HCL 25 MG
25 CAPSULE ORAL EVERY 6 HOURS PRN
Status: DISCONTINUED | OUTPATIENT
Start: 2025-03-29 | End: 2025-03-29

## 2025-03-29 RX ORDER — VITAMIN B COMPLEX
1000 TABLET ORAL ONCE
Status: COMPLETED | OUTPATIENT
Start: 2025-03-29 | End: 2025-03-29

## 2025-03-29 RX ORDER — VITAMIN B COMPLEX
1000 TABLET ORAL DAILY
Status: DISCONTINUED | OUTPATIENT
Start: 2025-03-29 | End: 2025-04-02 | Stop reason: HOSPADM

## 2025-03-29 RX ORDER — LURASIDONE HYDROCHLORIDE 20 MG/1
20 TABLET, FILM COATED ORAL
Status: DISCONTINUED | OUTPATIENT
Start: 2025-03-29 | End: 2025-03-30

## 2025-03-29 RX ADMIN — Medication 1000 UNITS: at 20:26

## 2025-03-29 RX ADMIN — LURASIDONE HYDROCHLORIDE 20 MG: 20 TABLET, FILM COATED ORAL at 20:26

## 2025-03-29 RX ADMIN — DIPHENHYDRAMINE HYDROCHLORIDE 25 MG: 25 CAPSULE ORAL at 23:10

## 2025-03-29 ASSESSMENT — SLEEP AND FATIGUE QUESTIONNAIRES
SLEEP PATTERN: DIFFICULTY FALLING ASLEEP
DO YOU HAVE DIFFICULTY SLEEPING: YES
DO YOU USE A SLEEP AID: YES
AVERAGE NUMBER OF SLEEP HOURS: 8

## 2025-03-29 ASSESSMENT — PAIN - FUNCTIONAL ASSESSMENT: PAIN_FUNCTIONAL_ASSESSMENT: NONE - DENIES PAIN

## 2025-03-29 NOTE — GROUP NOTE
Group Therapy Note    Date: 3/29/2025    Group Start Time: 1315  Group End Time: 1400  Group Topic: Recreational    SSR 2 BH NON ACUTE    Randi Du        Group Therapy Note    Attendees: 3/9    Facilitated structured relaxation group and introduced Mindfulness as a positive way to manage stress.         Notes:  Did not attend group despite encouragement    Discipline Responsible: Recreational Therapist      Signature:  CASTILLO Bahena

## 2025-03-29 NOTE — GROUP NOTE
Group Therapy Note    Date: 3/29/2025    Group Start Time: 0930  Group End Time: 1015  Group Topic: Education Group - Inpatient    SSR 2 BH NON ACUTE    Randi Du        Group Therapy Note    Attendees: 3/8    Facilitated structured group to increase awareness of triggers, encourage pt. to explore places, people and situations that cause strong emotional responses and encourage pt to identify positive ways to manage triggers.           Notes:  Did not attend group despite encouragement        Discipline Responsible: Recreational Therapist      Signature:  CASTILLO Bahena     8 (severe pain)

## 2025-03-29 NOTE — H&P
INITIAL PSYCHIATRIC EVALUATION            IDENTIFICATION:    Patient Name  Kindra Ross   Date of Birth 1995   [facility] [id number]   Medical Record Number  [id number]      Age  30 y.o.   PCP No primary care provider on file.   Admit date:  3/29/2025    Room Number  246/01  @ [facility]   Date of Service  3/29/2025            HISTORY         REASON FOR HOSPITALIZATION:    CC: Paranoia, extreme anxiety, hypomanic, euphoric, repeating \"glory to God\", believing that people is watching her and can read her thoughts, unable to keep her personal hygiene, unable to care for herself or her children.    Patient initially walked into triage and repeating \"Glory to my God\" on repeat while clutching 2 books and her prayer rug.      HISTORY OF PRESENT ILLNESS:     The patient, Kindra Ross, is a 30 y.o. female admitted to the behavioral health floor after patient presents to the emergency department feeling paranoid, extreme anxiety, repeating \"glory to God\", believing that someone is watching her and read her mind and thoughts.  Patient has expressed \"I came to get checked into a mental mosher\".  Patient presented with disturbing behavior, euphoric, elated mood, rapid pressured speech and circumstantial thinking and restless.  She also has expressed having intrusive thoughts that make me paranoid.  She has expressed \"I have been dealing with this all my life, I have changed my ways from whom I used to be so I am doing this is my last resort and it is the last time that I am going to be at the hospital if it does not work nothing will work\".  She has also expressed that \"what I am going through people act as if it is unheard of\" she expresses \"I do not feel normal\" patient presents with racing thoughts pressured speech feels impatient.    Patient seen this morning who expresses, was able to rest.  She still presents with flight of ideas.  She states she does not want to carry her pregnancy as this is her fifth

## 2025-03-29 NOTE — CARE COORDINATION
Referral to community services   Frequency prior to discharge   Measured by Behavioral data   Staff Responsible [facility] staff   STG Goal 2 Status: Patient Appears to be  Progressing toward treatment plan goal   Crisis/Safety/Discharge Plan   Crisis/Safety Plan Standard program interventions and protocol   Comprehensive Assessment Completion Date 03/29/25   Discharge Plan to stabilize pt and return home with outpatient resources

## 2025-03-29 NOTE — PROGRESS NOTES
Admission Note:     Patient arrived to the unit from [facility] at 0345 via [facility] transport services: Kindra Brandon is a 31 yo female who is also a Jewish, voluntary admission accepted by Dr Quinones. She presented to the emergency room paranoid and having some extreme anxiety repeating “Glory to God” while praying with a prayer rug and clutching books as well as looking around the room thinking someone is watching her and can read her thoughts. She has PMH of hypertension, chlamydia, anemia during pregnancy, heart murmur who presents to the ER with reports of being paranoid. Patient reports \" I came to get checked into the mental mosher.\" Patient reports she has been admitted in the past, but unsure when her last admission was. However, the patient states she does see an outpatient therapist and psychiatrist. Patient reports paranoid about other people and things. Patient reports no SI or HI. No hallucinations. Patient denies chest pain, shortness of breath, abdominal pain, urinary symptoms, nausea or vomiting, diarrhea or constipation, headache, dizziness, lightheadedness, fever or chills. Patient denies any alcohol use, denies smoking/vaping or illicit drug use. Patient currently lives with grandmother.      Pt scored low risk for suicide on C-SSRS screening. Dr Quinones notified of score and patient presentation. Patient will be monitored for safety every 15 minutes.     UDS came back positive for pregnancy. Per ER nurse she is about two weeks pregnant, and this is her fifth pregnancy and her mental health symptoms become worse when she is pregnant.     The writer spoke with Dr. Quinones and she will not be starting the patient on any medication until she is assessed by her. However, she instructed the writer to order her Tylenol and Benadryl PRN. Dr. Quinones also gave writer permission for patient to keep her head hajib related to her cultural Yazidi.    Safety search completed by two staff members, Unit handbook

## 2025-03-29 NOTE — GROUP NOTE
Group Therapy Note    Date: 3/29/2025    Group Start Time: 1730  Group End Time: 1820  Group Topic: Recreational    SSR 2 BH NON ACUTE    Randi Du        Group Therapy Note    Attendees: 5/7    Recreational Therapist facilitated structured leisure skills group to introduce healthy leisure skills as positive way to cope and manage mood.           Notes:  Did not attend group    Discipline Responsible: Recreational Therapist      Signature:  CASTILLO Bahena

## 2025-03-29 NOTE — CONSULTS
protection in the past week and past 2 months.  Last menses was approximately 2/28.  Patient denies vaginal bleeding or discharge, vaginal irritation, abdominal pain, or pelvic pain.  Also denies urinary discharge, irritation, or increased bladder pressure/frequency.  Patient states she is not interested keeping the pregnancy.  We were asked to admit for work up and evaluation of the above problems.     Past Medical History:   Diagnosis Date    Anemia during pregnancy in second trimester 10/1/2019    Chlamydia     Hypertension     Murmur 10/1/2019        No past surgical history on file.    Social History     Tobacco Use    Smoking status: Light Smoker     Current packs/day: 0.25     Types: Cigarettes    Smokeless tobacco: Never   Substance Use Topics    Alcohol use: Not Currently        No family history on file.  No Known Allergies     Prior to Admission medications    Medication Sig Start Date End Date Taking? Authorizing Provider   escitalopram (LEXAPRO) 20 MG tablet Take 1 tablet by mouth daily 1/13/25  Yes Shruthi Meyers MD   butalbital-acetaminophen-caffeine (FIORICET, ESGIC) -40 MG per tablet Take 1 tablet by mouth every 4 hours as needed for Headaches 11/15/24  Yes Conchita Sotomayor PA-C   ibuprofen (ADVIL;MOTRIN) 800 MG tablet Take 1 tablet by mouth 3 times daily as needed for Pain 11/15/24  Yes Conchita Sotomayor PA-C   benzocaine (ORAJEL) 20 % GEL mucosal gel Use as directed 12/25/23  Yes Jamison Damon MD   Cold Sore Products (ANBESOL COLD SORE THERAPY) OINT Use as directed 12/25/23  Yes Jamison Damon MD   acetaminophen (TYLENOL) 325 MG tablet Take 2 tablets by mouth every 6 hours as needed for Pain 12/23/23  Yes Tera Mcdowell PA         Objective:   VITALS:    Patient Vitals for the past 24 hrs:   BP Temp Temp src Pulse Resp SpO2 Height Weight   03/29/25 0930 130/84 99 °F (37.2 °C) Oral 80 16 97 % -- --   03/29/25 0458 -- -- -- -- -- -- 1.549 m (5' 1\") 77.6 kg (171 lb)   03/29/25 0354 132/79

## 2025-03-29 NOTE — ED NOTES
Bedside and Verbal shift change report given to Yesenia RN (oncoming nurse) by Jim RN (offgoing nurse). Report included the following information Nurse Handoff Report, ED SBAR, Recent Results, and Neuro Assessment.

## 2025-03-30 PROCEDURE — 6370000000 HC RX 637 (ALT 250 FOR IP): Performed by: PSYCHIATRY & NEUROLOGY

## 2025-03-30 PROCEDURE — 6370000000 HC RX 637 (ALT 250 FOR IP)

## 2025-03-30 PROCEDURE — 1240000000 HC EMOTIONAL WELLNESS R&B

## 2025-03-30 RX ORDER — LURASIDONE HYDROCHLORIDE 20 MG/1
40 TABLET, FILM COATED ORAL
Status: DISCONTINUED | OUTPATIENT
Start: 2025-03-30 | End: 2025-04-02 | Stop reason: HOSPADM

## 2025-03-30 RX ORDER — TRAZODONE HYDROCHLORIDE 50 MG/1
50 TABLET ORAL NIGHTLY PRN
Status: DISCONTINUED | OUTPATIENT
Start: 2025-03-30 | End: 2025-04-02 | Stop reason: HOSPADM

## 2025-03-30 RX ADMIN — TRAZODONE HYDROCHLORIDE 50 MG: 50 TABLET ORAL at 19:56

## 2025-03-30 RX ADMIN — Medication 1000 UNITS: at 19:56

## 2025-03-30 RX ADMIN — LURASIDONE HYDROCHLORIDE 40 MG: 20 TABLET, FILM COATED ORAL at 19:56

## 2025-03-30 ASSESSMENT — PAIN - FUNCTIONAL ASSESSMENT: PAIN_FUNCTIONAL_ASSESSMENT: NONE - DENIES PAIN

## 2025-03-30 NOTE — PROGRESS NOTES
Progress Note  Date:3/30/2025       Room:[facility]  Patient Name:Kindra Ross     YOB: 1995     Age:30 y.o.        Subjective    Subjective   Ms Vandana 30-year-old female who has been mostly isolated to herself.  She has been engaging in therapy.  She expresses not able to sleep as well her medications.  She has no side effect from medications.  Patient was explained at length regarding her pregnancy and medication safe alternatives.  Patient expresses that she is not planning to continue the pregnancy as she already has for children.  Patient expresses a lot of anxiety when she has to be around groups.  Patient did acknowledge that she has been unable to care for self her children and has been difficulty with her daily functioning.  She denies having any active SI or HI.  No hypomanic pressured speech noted today.    Mental status examination-    Patient is alert, oriented x3   Speech is coherent, moderate volume, no flight of ideas, no loosening of associations.  Casually dressed and groomed  No Active suicidal ideations, plan or intent.  No active homicidal ideations plan or intent  No command hallucinations  Thought Processes linear  Not seen responding to any active internal stimuli  No persecutory delusions  Insight limited  Judgement limited     Review of Systems  Objective         Vitals Last 24 Hours:  TEMPERATURE:  Temp  Av.4 °F (36.9 °C)  Min: 98.2 °F (36.8 °C)  Max: 98.6 °F (37 °C)  RESPIRATIONS RANGE: Resp  Av  Min: 16  Max: 20  PULSE OXIMETRY RANGE: SpO2  Av.5 %  Min: 99 %  Max: 100 %  PULSE RANGE: Pulse  Av.5  Min: 80  Max: 83  BLOOD PRESSURE RANGE: Systolic (24hrs), Av , Min:128 , Max:133   ; Diastolic (24hrs), Av, Min:84, Max:92    I/O (24Hr):  No intake or output data in the 24 hours ending 25 1330  Objective  Labs/Imaging/Diagnostics    Labs:  CBC:  Recent Labs     25  1849   WBC 4.0   RBC 3.99   HGB 11.5   HCT 35.0   MCV 87.7   RDW 13.5

## 2025-03-30 NOTE — GROUP NOTE
Group Therapy Note    Date: 3/30/2025    Group Start Time: 1315  Group End Time: 1400  Group Topic: Relaxation    SSR 2 BH NON ACUTE    Randi Du        Group Therapy Note    Attendees: 4/8    Facilitated structured group to identify positive ways to cope and manage daily stressors. Introduced relaxation technique using Guided Imagery to practice relaxation.         Patient's Goal:  Client will use words to express feelings, wants, and needs     Notes:  Initially attended group. Related she was feeling nervous about attending groups. At the beginning of the intervention, pt left group and did not return.     Status After Intervention:  Unchanged    Participation Level: Minimal    Participation Quality: Appropriate      Speech:  normal      Thought Process/Content: Logical      Affective Functioning: Flat      Mood: anxious      Level of consciousness:  Alert      Response to Learning: Progressing to goal      Endings: None Reported    Modes of Intervention: Activity      Discipline Responsible: Recreational Therapist      Signature:  CASTILLO Bahena

## 2025-03-30 NOTE — PROGRESS NOTES
Spiritual Health History and Assessment/Progress Note  [facility]    Spiritual/Emotional Needs,  ,  ,      Name: Kindra Ross MRN: [id number]    Age: 30 y.o.     Sex: female   Language: English   Confucianism: Other   Acute psychosis (HCC)     Date: 3/30/2025            Total Time Calculated: 62 min              Spiritual Assessment began in [facility] 2  NON ACUTE        Referral/Consult From: Nurse   Encounter Overview/Reason: Spiritual/Emotional Needs  Service Provided For: Patient    Nevaeh, Belief, Meaning:   Patient identifies as spiritual, is connected with a nevaeh tradition or spiritual practice, and has beliefs or practices that help with coping during difficult times  Family/Friends No family/friends present      Importance and Influence:  Patient has spiritual/personal beliefs that influence decisions regarding their health  Family/Friends No family/friends present    Community:  Patient is connected with a spiritual community and feels well-supported. Support system includes: [place] Community and Extended family  Family/Friends No family/friends present    Assessment and Plan of Care:     Patient Interventions include: Facilitated expression of thoughts and feelings, Explored spiritual coping/struggle/distress, Engaged in theological reflection, Affirmed coping skills/support systems, and Facilitated life review and/ or legacy  Family/Friends Interventions include: No family/friends present    Patient Plan of Care: Spiritual Care available upon further referral  Family/Friends Plan of Care: Spiritual Care available upon further referral     is responding to a spiritual consult for the patient in 246. Kindra shared about her current concerns and feelings of distress related to how she has treated her family.  provided an active listening presence as she shared and became tearful. She is [employer] and was studying a copy of the [employer].  engaged her in spiritual dialogue about her  Normal rate, regular rhythm, normal S1, S2 heart sounds heard.

## 2025-03-30 NOTE — GROUP NOTE
Group Therapy Note    Date: 3/30/2025    Group Start Time: 0930  Group End Time: 1020  Group Topic: Education Group - Inpatient    SSR 2 BH NON ACUTE    Randi Du        Group Therapy Note    Attendees: 3/8    Facilitated structured group discussion to identify protective factors that have been valuable and protective factors that could be improved in managing stressors.             Notes:  Did not attend group despite encouragement    Discipline Responsible: Recreational Therapist      Signature:  CASTILLO Bahena     Hydroxychloroquine Pregnancy And Lactation Text: This medication has been shown to cause fetal harm but it isn't assigned a Pregnancy Risk Category. There are small amounts excreted in breast milk.

## 2025-03-30 NOTE — GROUP NOTE
Group Therapy Note    Date: 3/30/2025    Group Start Time: 1745  Group End Time: 1830  Group Topic: Recreational    SSR 2 BH NON ACUTE    Randi Du        Group Therapy Note    Attendees: 6/8       Recreational Therapist facilitated structured leisure skills group to introduce healthy leisure skills as positive way to cope and manage mood.          Notes:  Did not attend group despite encouragement      Discipline Responsible: Recreational Therapist      Signature:  CASTILLO Bahena

## 2025-03-31 PROCEDURE — 6370000000 HC RX 637 (ALT 250 FOR IP): Performed by: PSYCHIATRY & NEUROLOGY

## 2025-03-31 PROCEDURE — 6370000000 HC RX 637 (ALT 250 FOR IP)

## 2025-03-31 PROCEDURE — 1240000000 HC EMOTIONAL WELLNESS R&B

## 2025-03-31 RX ADMIN — Medication 1000 UNITS: at 19:50

## 2025-03-31 RX ADMIN — TRAZODONE HYDROCHLORIDE 50 MG: 50 TABLET ORAL at 21:23

## 2025-03-31 RX ADMIN — ACETAMINOPHEN 650 MG: 325 TABLET ORAL at 19:49

## 2025-03-31 RX ADMIN — LURASIDONE HYDROCHLORIDE 40 MG: 20 TABLET, FILM COATED ORAL at 19:50

## 2025-03-31 ASSESSMENT — PAIN SCALES - GENERAL
PAINLEVEL_OUTOF10: 10
PAINLEVEL_OUTOF10: 10
PAINLEVEL_OUTOF10: 9
PAINLEVEL_OUTOF10: 10

## 2025-03-31 ASSESSMENT — PAIN DESCRIPTION - LOCATION: LOCATION: HEAD

## 2025-03-31 ASSESSMENT — PAIN DESCRIPTION - ORIENTATION: ORIENTATION: ANTERIOR

## 2025-03-31 ASSESSMENT — PAIN DESCRIPTION - DESCRIPTORS: DESCRIPTORS: DULL;ACHING;DISCOMFORT

## 2025-03-31 NOTE — GROUP NOTE
Group Therapy Note    Date: 3/31/2025    Group Start Time: 1100  Group End Time: 1130  Group Topic: Process Group - Inpatient    SSR 2 [facility] ACUTE    Sriram Gregorio        Group Therapy Note: This writer facilitated a group where \"boundary styles\" and \"boundary types\" were discussed in a group setting. Then a \"set boundaries\" game was played.     Attendees: 1       Patient's Goal:  to attend groups.     Notes:  Pt able to identify that she believes she has \"porous boundaries\" because by letting anyone get close to her, and by over sharing personal information she becomes vunerable to hurt and exposes herself too much. She stated overly getting involved in other's problems she forgets to focus on hers and gets distracted, and by avoiding conflict by giving in to others she feels like she gets taken advantage of. Pt able to voice that setting healthy boundaries is something she struggles with but is trying to work on it one day at at time, for example she has started setting healthy physical boundaries with her children by telling them to stay at arm's length at all times.      Status After Intervention:  Improved    Participation Level: Active Listener and Interactive    Participation Quality: Appropriate, Attentive, Sharing, and Supportive      Speech:  normal      Thought Process/Content: Logical  Linear      Affective Functioning: Congruent      Mood: calm      Level of consciousness:  Alert, Oriented x4, and Attentive      Response to Learning: Able to verbalize current knowledge/experience, Able to verbalize/acknowledge new learning, Able to retain information, Capable of insight, Able to change behavior, and Progressing to goal      Endings: None Reported    Modes of Intervention: Education, Support, and Socialization      Discipline Responsible: /Counselor      Signature:  Sriram Gregorio

## 2025-03-31 NOTE — PROGRESS NOTES
Progress Note  Date:3/31/2025       Room:[facility]  Patient Name:Kindra Ross     YOB: 1995     Age:30 y.o.        Subjective    Subjective   Ms Vandana 30-year-old female who has been mostly isolated to herself.  She has been continuing to make progress in coming out for groups individual case management and medication management discussions.  She has been engaging in therapy.    Patient expresses that her challenges for wanting to learn how to care for herself and gain more knowledge.  Patient did acknowledge that she has been unable to care for self her children and has been difficulty with her daily functioning.  She denies having any active SI or HI.  No hypomanic pressured speech noted today.  She expresses grandma is her good support.    Mental status examination-    Patient is alert, oriented x3   Speech is coherent, moderate volume, no flight of ideas, no loosening of associations.  Casually dressed and groomed  No Active suicidal ideations, plan or intent.  No active homicidal ideations plan or intent  No command hallucinations  Thought Processes linear  Not seen responding to any active internal stimuli  No persecutory delusions  Insight limited  Judgement limited     Review of Systems  Objective         Vitals Last 24 Hours:  TEMPERATURE:  Temp  Av.5 °F (37.5 °C)  Min: 99.5 °F (37.5 °C)  Max: 99.5 °F (37.5 °C)  RESPIRATIONS RANGE: Resp  Av  Min: 18  Max: 18  PULSE OXIMETRY RANGE: SpO2  Av %  Min: 98 %  Max: 98 %  PULSE RANGE: Pulse  Av  Min: 82  Max: 82  BLOOD PRESSURE RANGE: Systolic (24hrs), Av , Min:129 , Max:129   ; Diastolic (24hrs), Av, Min:87, Max:87    I/O (24Hr):  No intake or output data in the 24 hours ending 25 1501  Objective:  Vital signs: (most recent): Blood pressure 129/87, pulse 82, temperature 99.5 °F (37.5 °C), temperature source Oral, resp. rate 18, height 1.549 m (5' 1\"), weight 77.6 kg (171 lb), SpO2 98%.      Labs/Imaging/Diagnostics

## 2025-03-31 NOTE — GROUP NOTE
Group Therapy Note    Date: 3/31/2025    Group Start Time: 1315  Group End Time: 1400  Group Topic: Recreational    SSR 2  NON ACUTE    Rafaela Samson        Group Therapy Note    Facilitated leisure skills group to reinforce positive coping and to manage mood through music, social interaction, group activities and art task       Attendees: 4/5       Patient's Goal:  Client will use words to express feelings, wants and needs    Notes: Pt was receptive to listening to music and a song she selected while writing in journal. Interacted with staff when prompted. Pt was noted to be talking and smiling to herself. Selected velvet art task to work on later    Status After Intervention:  Unchanged    Participation Level: Active Listener    Participation Quality: Appropriate      Speech:  normal      Thought Process/Content: Noted to be talking and smiling to self      Affective Functioning: Congruent      Mood:  calm      Level of consciousness:  Alert and Preoccupied      Response to Learning: Progressing to goal      Endings: None Reported    Modes of Intervention: Socialization and Activity      Discipline Responsible: Recreational Therapist      Signature:  CASTILLO Castro

## 2025-03-31 NOTE — PROGRESS NOTES
Kindra Ross participated in Spirituality Group about BEAUTIFUL IMPERFECTION on SSR 2 BH NON ACUTE          RIP WALLS  Chaplain Resident

## 2025-03-31 NOTE — GROUP NOTE
Group Therapy Note    Date: 3/31/2025    Group Start Time: 0930  Group End Time: 1025  Group Topic: Education Group - Inpatient    SSR 2  NON ACUTE    Rafaela Samson        Group Therapy Note    Facilitated discussion focused on defining and sharing examples of different types of automatic negative thoughts and how they affect moods and behaviors       Attendees: 4/8      Notes:  Encouraged but did not attend    Discipline Responsible: Recreational Therapist      Signature:  CASTILLO Castro

## 2025-04-01 PROCEDURE — 6370000000 HC RX 637 (ALT 250 FOR IP): Performed by: PSYCHIATRY & NEUROLOGY

## 2025-04-01 PROCEDURE — 6370000000 HC RX 637 (ALT 250 FOR IP)

## 2025-04-01 PROCEDURE — 1240000000 HC EMOTIONAL WELLNESS R&B

## 2025-04-01 RX ADMIN — LURASIDONE HYDROCHLORIDE 40 MG: 20 TABLET, FILM COATED ORAL at 19:44

## 2025-04-01 RX ADMIN — Medication 1000 UNITS: at 19:44

## 2025-04-01 RX ADMIN — TRAZODONE HYDROCHLORIDE 50 MG: 50 TABLET ORAL at 22:05

## 2025-04-01 ASSESSMENT — PAIN SCALES - GENERAL: PAINLEVEL_OUTOF10: 0

## 2025-04-01 NOTE — PROGRESS NOTES
Progress Note  Date:2025       Room:[facility]  Patient Name:Kindra Ross     YOB: 1995     Age:30 y.o.        Subjective    Subjective   Ms Vandana 30-year-old female has been compliant with medications.  She is currently in bed reports she has been tolerating her medications and sleep has been better.  Her mood has been more calm.  Still somewhat internally preoccupied.  States depression and anxiety.  Not voiced any active suicidal thoughts.    Patient expresses that her challenges for wanting to learn how to care for herself and gain more knowledge.  Patient did acknowledge that she has been unable to care for self her children and has been difficulty with her daily functioning.  She denies having any active SI or HI.  No hypomanic pressured speech noted today.  She expresses grandma is her good support.    Mental status examination-    Patient is alert, oriented x3   Speech is coherent, moderate volume, no flight of ideas, no loosening of associations.  Casually dressed and groomed  No Active suicidal ideations, plan or intent.  No active homicidal ideations plan or intent  No command hallucinations  Thought Processes linear  Not seen responding to any active internal stimuli  No persecutory delusions  Insight limited  Judgement limited     Review of Systems  Objective         Vitals Last 24 Hours:  TEMPERATURE:  Temp  Av.6 °F (37 °C)  Min: 98.4 °F (36.9 °C)  Max: 98.8 °F (37.1 °C)  RESPIRATIONS RANGE: Resp  Av  Min: 16  Max: 16  PULSE OXIMETRY RANGE: SpO2  Av %  Min: 100 %  Max: 100 %  PULSE RANGE: Pulse  Av  Min: 80  Max: 84  BLOOD PRESSURE RANGE: Systolic (24hrs), Av , Min:110 , Max:131   ; Diastolic (24hrs), Av, Min:70, Max:80    I/O (24Hr):  No intake or output data in the 24 hours ending 25 9916  Objective:  Vital signs: (most recent): Blood pressure 110/70, pulse 80, temperature 98.8 °F (37.1 °C), temperature source Oral, resp. rate 16, height 1.549 m (5'

## 2025-04-01 NOTE — PLAN OF CARE
Problem: Anxiety  Goal: Will report anxiety at manageable levels  Description: INTERVENTIONS:  1. Administer medication as ordered  2. Teach and rehearse alternative coping skills  3. Provide emotional support with 1:1 interaction with staff  Outcome: Progressing     Problem: Coping  Goal: Pt/Family able to verbalize concerns and demonstrate effective coping strategies  Description: INTERVENTIONS:  1. Assist patient/family to identify coping skills, available support systems and cultural and spiritual values  2. Provide emotional support, including active listening and acknowledgement of concerns of patient and caregivers  3. Reduce environmental stimuli, as able  4. Instruct patient/family in relaxation techniques, as appropriate  5. Assess for spiritual pain/suffering and initiate Spiritual Care, Psychosocial Clinical Specialist consults as needed  Outcome: Progressing     Problem: Decision Making  Goal: Pt/Family able to effectively weigh alternatives and participate in decision making related to treatment and care  Description: INTERVENTIONS:  1. Determine when there are differences between patient's view, family's view, and healthcare provider's view of condition  2. Facilitate patient and family articulation of goals for care  3. Help patient and family identify pros/cons of alternative solutions  4. Provide information as requested by patient/family  5. Respect patient/family right to receive or not to receive information  6. Serve as a liaison between patient and family and health care team  7. Initiate Consults from Ethics, Palliative Care or initiate Family Care Conference as is appropriate  Outcome: Progressing     Problem: Depression/Self Harm  Goal: Effect of psychiatric condition will be minimized and patient will be protected from self harm  Description: INTERVENTIONS:  1. Assess impact of patient's symptoms on level of functioning, self care needs and offer support as indicated  2. Assess 
  Problem: Coping  Goal: Pt/Family able to verbalize concerns and demonstrate effective coping strategies  Description: INTERVENTIONS:  1. Assist patient/family to identify coping skills, available support systems and cultural and spiritual values  2. Provide emotional support, including active listening and acknowledgement of concerns of patient and caregivers  3. Reduce environmental stimuli, as able  4. Instruct patient/family in relaxation techniques, as appropriate  5. Assess for spiritual pain/suffering and initiate Spiritual Care, Psychosocial Clinical Specialist consults as needed  Outcome: Progressing    Affect restricted/guarded; however, approached staff and requested whatever she needed. Smiled during interactions. Stays to herself; spent the majority of the morning, in her room. Pt is currently not on scheduled meds. Pt slept late; did not get up,for bfkt, although called. Consumed 100% of lunch, in her room. Pt did not get up, for morning group, although called/encouraged. Pt showered and dressed in her [employer] attire. Pt did not attend morning group; however, attended afternoon music group. Denied hearing voices. Q 15 mins checks maintained, for safety.      
  Problem: Discharge Planning  Goal: Discharge to home or other facility with appropriate resources  Outcome: Progressing     Problem: Anxiety  Goal: Will report anxiety at manageable levels  Description: INTERVENTIONS:  1. Administer medication as ordered  2. Teach and rehearse alternative coping skills  3. Provide emotional support with 1:1 interaction with staff  Outcome: Progressing     Problem: Coping  Goal: Pt/Family able to verbalize concerns and demonstrate effective coping strategies  Description: INTERVENTIONS:  1. Assist patient/family to identify coping skills, available support systems and cultural and spiritual values  2. Provide emotional support, including active listening and acknowledgement of concerns of patient and caregivers  3. Reduce environmental stimuli, as able  4. Instruct patient/family in relaxation techniques, as appropriate  5. Assess for spiritual pain/suffering and initiate Spiritual Care, Psychosocial Clinical Specialist consults as needed  Outcome: Progressing     Problem: Decision Making  Goal: Pt/Family able to effectively weigh alternatives and participate in decision making related to treatment and care  Description: INTERVENTIONS:  1. Determine when there are differences between patient's view, family's view, and healthcare provider's view of condition  2. Facilitate patient and family articulation of goals for care  3. Help patient and family identify pros/cons of alternative solutions  4. Provide information as requested by patient/family  5. Respect patient/family right to receive or not to receive information  6. Serve as a liaison between patient and family and health care team  7. Initiate Consults from Ethics, Palliative Care or initiate Family Care Conference as is appropriate  Outcome: Progressing     Problem: Depression/Self Harm  Goal: Effect of psychiatric condition will be minimized and patient will be protected from self harm  Description: INTERVENTIONS:  1. 
Problem: Anxiety  Goal: Will report anxiety at manageable levels  Description: INTERVENTIONS:  1. Administer medication as ordered  2. Teach and rehearse alternative coping skills  3. Provide emotional support with 1:1 interaction with staff  Outcome: Progressing    Intermittently visible on the unit. Compliant with scheduled medications and PRN Trazodone.   Pt denies SI/HI/AVH and has not reported any concerns at this time.             
Problem: Anxiety  Goal: Will report anxiety at manageable levels  Description: INTERVENTIONS:  1. Administer medication as ordered  2. Teach and rehearse alternative coping skills  3. Provide emotional support with 1:1 interaction with staff  Outcome: Progressing    Pt has been in her room reading. Compliant with medications. Pt denies SI/HI/AVH reports current mood as, \"thankful\" and her goal is \"to go to group tomorrow.\"   
of patient's symptoms on level of functioning, self care needs and offer support as indicated  2. Assess patient/family knowledge of depression, impact on illness and need for teaching  3. Provide emotional support, presence and reassurance  4. Assess for possible suicidal thoughts or ideation. If patient expresses suicidal thoughts or statements do not leave alone, initiate Suicide Precautions, move to a room close to the nursing station and obtain sitter  5. Initiate consults as appropriate with Mental Health Professional, Spiritual Care, Psychosocial CNS, and consider a recommendation to the [facility] for a Psychiatric Consultation  Outcome: Progressing  Flowsheets (Taken 3/31/2025 1950)  Effect of psychiatric condition will be minimized and patient will be protected from self harm: Provide emotional support, presence and reassurance     Problem: Discharge Planning  Goal: Discharge to home or other facility with appropriate resources  Outcome: Progressing

## 2025-04-01 NOTE — GROUP NOTE
Group Therapy Note    Date: 4/1/2025    Group Start Time: 1115  Group End Time: 1145  Group Topic: Process Group - Inpatient    [facility]    Yolis Madrigal MSW        Group Therapy Note: Facilitator engaged the group in therapeutic discussion about emotions and coping skills. Discussed best treatment for depression and how to perform behavioral activation with 5/4/3/2/1 LAUNCH.    Attendees: 4       Patient's Goal:  To attend groups    Notes:  Pt shared that her mood is \"good\" emotionally but \"sleepy\" physically.  Pt kept her head down with eyes closed during the group and appeared to be responding to internal stimuli. She did participate when prompted.     Status After Intervention:  Unchanged    Participation Level: Minimal    Participation Quality: Sharing      Speech:  hesitant      Thought Process/Content: Perseverating      Affective Functioning: Flat      Mood:  \"good\"      Level of consciousness:  Oriented x4, Drowsy, and Preoccupied      Response to Learning: Able to verbalize current knowledge/experience and Able to verbalize/acknowledge new learning      Endings: None Reported    Modes of Intervention: Education, Support, and Socialization      Discipline Responsible: /Counselor      Signature:  JONATHAN CUNHA

## 2025-04-02 VITALS
RESPIRATION RATE: 18 BRPM | SYSTOLIC BLOOD PRESSURE: 108 MMHG | BODY MASS INDEX: 32.28 KG/M2 | DIASTOLIC BLOOD PRESSURE: 58 MMHG | HEART RATE: 79 BPM | WEIGHT: 171 LBS | OXYGEN SATURATION: 99 % | TEMPERATURE: 98.6 F | HEIGHT: 61 IN

## 2025-04-02 RX ORDER — VITAMIN B COMPLEX
1000 TABLET ORAL DAILY
Qty: 30 TABLET | Refills: 1 | Status: SHIPPED | OUTPATIENT
Start: 2025-04-02

## 2025-04-02 RX ORDER — LURASIDONE HYDROCHLORIDE 40 MG/1
40 TABLET, FILM COATED ORAL
Qty: 30 TABLET | Refills: 1 | Status: SHIPPED | OUTPATIENT
Start: 2025-04-02

## 2025-04-02 RX ORDER — TRAZODONE HYDROCHLORIDE 50 MG/1
50 TABLET ORAL NIGHTLY PRN
Qty: 30 TABLET | Refills: 1 | Status: SHIPPED | OUTPATIENT
Start: 2025-04-02

## 2025-04-02 NOTE — BH NOTE
Behavioral Health Transition Record to Provider    Patient Name: Kindra Ross  YOB: 1995  Medical Record Number: [id number]  Date of Admission: 3/29/2025  Date of Discharge: 4-2-25    Attending Provider: Debra Quinones MD  Discharging Provider: Dr. Quinones  To contact this individual call [phone] and ask the  to page.  If unavailable, ask to be transferred to Behavioral Health Provider on call.  A Behavioral Health Provider will be available on call 24/7 and during holidays.    Primary Care Provider: No primary care provider on file.    No Known Allergies    Reason for Admission: Pt was voluntarily admitted to [facility] due to reports of paranoia, feelings like something is wrong and intrusive thoughts. Pt reported in the ED that she people can hear her thoughts, so she does not like to go out in public. Pt denied any SI/HI/avh or substance use. Pt has been admitted to [facility] in the past and is connected with psychiatry but feels like nothing is working. Pt is currently taking Lexapro and has a good support system.     Admission Diagnosis: Psychosis (HCC) [F29]  Acute psychosis (HCC) [F23]    * No surgery found *    No results found for this visit on 03/29/25.    Immunizations administered during this encounter:   There is no immunization history on file for this patient.    Screening for Metabolic Disorders for Patients on Antipsychotic Medications  (Data obtained from the EMR)    Estimated Body Mass Index  Estimated body mass index is 32.31 kg/m² as calculated from the following:    Height as of this encounter: 1.549 m (5' 1\").    Weight as of this encounter: 77.6 kg (171 lb).     Vital Signs/Blood Pressure  BP (!) 108/58   Pulse 79   Temp 98.6 °F (37 °C) (Oral)   Resp 18   Ht 1.549 m (5' 1\")   Wt 77.6 kg (171 lb)   SpO2 99%   BMI 32.31 kg/m²     Blood Glucose/Hemoglobin A1c  No results found for: \"GLU\", \"GLUCPOC\"    No results found for: \"HBA1C\"     Lipid Panel  Lab Results   Component Value 
Behavioral Health Transition Record to Provider    Patient Name: Kindra Ross  YOB: 1995  Medical Record Number: [id number]  Date of Admission: 3/29/2025  Date of Discharge: 4/02/2025    Attending Provider: Debra Quinones MD  Discharging Provider: Dr Quinones  To contact this individual call  and ask the  to page.  If unavailable, ask to be transferred to Behavioral Health Provider on call.  A Behavioral Health Provider will be available on call 24/7 and during holidays.    Primary Care Provider: No primary care provider on file.    No Known Allergies    Reason for Admission: Pt was voluntarily admitted to [facility] due to reports of paranoia, feelings like something is wrong and intrusive thoughts. Pt reported in the ED that she people can hear her thoughts, so she does not like to go out in public. Pt denied any SI/HI/avh or substance use.     Admission Diagnosis: Psychosis (HCC) [F29]  Acute psychosis (HCC) [F23]    * No surgery found *    No results found for this visit on 03/29/25.    Immunizations administered during this encounter:   There is no immunization history on file for this patient.    Screening for Metabolic Disorders for Patients on Antipsychotic Medications  (Data obtained from the EMR)    Estimated Body Mass Index  Estimated body mass index is 32.31 kg/m² as calculated from the following:    Height as of this encounter: 1.549 m (5' 1\").    Weight as of this encounter: 77.6 kg (171 lb).     Vital Signs/Blood Pressure  /87   Pulse 82   Temp 99.5 °F (37.5 °C) (Oral)   Resp 18   Ht 1.549 m (5' 1\")   Wt 77.6 kg (171 lb)   SpO2 98%   BMI 32.31 kg/m²     Blood Glucose/Hemoglobin A1c  No results found for: \"GLU\", \"GLUCPOC\"    No results found for: \"HBA1C\"     Lipid Panel  Lab Results   Component Value Date/Time    CHOL 181 10/02/2019 05:33 AM    HDL 85 10/02/2019 05:33 AM    LDL 76 10/02/2019 05:33 AM        Discharge Diagnosis: Psychosis (HCC) [F29]  Acute psychosis 
Behavioral Health Transition Record to Provider    Patient Name: Kindra Ross  YOB: 1995  Medical Record Number: [id number]  Date of Admission: 3/29/2025  Date of Discharge: 4/02/2025    Attending Provider: Debra Quinones MD  Discharging Provider: Dr Quinones  To contact this individual call  and ask the  to page.  If unavailable, ask to be transferred to Behavioral Health Provider on call.  A Behavioral Health Provider will be available on call 24/7 and during holidays.    Primary Care Provider: No primary care provider on file.    No Known Allergies    Reason for Admission: Pt was voluntarily admitted to [facility] due to reports of paranoia, feelings like something is wrong and intrusive thoughts. Pt reported in the ED that she people can hear her thoughts, so she does not like to go out in public. Pt denied any SI/HI/avh or substance use. Pt has been admitted to [facility] in the past and is connected with psychiatry but feels like nothing is working. Pt is currently taking Lexapro and has a good support system.     Admission Diagnosis: Psychosis (HCC) [F29]  Acute psychosis (HCC) [F23]    * No surgery found *    No results found for this visit on 03/29/25.    Immunizations administered during this encounter:   There is no immunization history on file for this patient.    Screening for Metabolic Disorders for Patients on Antipsychotic Medications  (Data obtained from the EMR)    Estimated Body Mass Index  Estimated body mass index is 32.31 kg/m² as calculated from the following:    Height as of this encounter: 1.549 m (5' 1\").    Weight as of this encounter: 77.6 kg (171 lb).     Vital Signs/Blood Pressure  /70   Pulse 80   Temp 98.8 °F (37.1 °C) (Oral)   Resp 16   Ht 1.549 m (5' 1\")   Wt 77.6 kg (171 lb)   SpO2 100%   BMI 32.31 kg/m²     Blood Glucose/Hemoglobin A1c  No results found for: \"GLU\", \"GLUCPOC\"    No results found for: \"HBA1C\"     Lipid Panel  Lab Results   Component Value 
Behavioral Health Treatment Team Note     Patient goal(s) for today: \"focus on self\"  Treatment team focus/goals: meds management, dc planning, group therapy     Progress note: pt was seen in her room as she was sitting on the bed reading The Koran. Pt presented to be pleasant upon approach, engaging in conversation with appropriate eye contact and open to this writer's approach. Pt presented as alert, oriented, calm, cooperative, clear in her thought process, well groomed, with fair judgment/insight. Pt denied current si/hi/ah/vh. Pt stated she slept \"good\" last night and was \"feeling better.\" Pt spoke to this writer about why she had been isolating to self adding \"by being here I have realized that I don't want to be my old self anymore, I don't want to think that I am better than others, like I am entitled, walk around like I know everything, I want to be humble, I want to become a new and better version of myself and so I have been needing time to think, to get my thoughts together.\" Pt admitted \"I still have a lot of work to do on myself and think positive and not negative.\" This writer provided pt with support and encouragement. This writer at one point asked pt if she had any hx of recent life changing events or trauma and pt stated \"no\" but did voice guilt and frustration over the fact that it is challenging not getting any support from her children's father and he having to be a single parent and care for them in all aspects of life. This writer discussed dc planning and pt stated she will be going back to living with her kids and grandmother and follow up with her therapist in place and pcp for meds management. Pt did not voice any other concerns. Pt cont to meet criteria for inpt stay for further stabilization through meds management.     LOS:  3  Expected LOS: 5-7 days     Insurance info/prescription coverage:  [employer] [facility]   Date of last family contact:  pt provided verbal dinorah to speak to 
Behavioral Health Treatment Team Note     Patient goal(s) for today: \"go to groups\"  Treatment team focus/goals: meds management, dc planning, group therapy    Progress note: pt was seen in her room as she was sitting on the bed reading The Koran. Pt presented to be pleasant upon approach, engaging in conversation with appropriate eye contact and open to this writer's approach. Pt presented as alert, oriented, calm, cooperative, clear in her thought process, well groomed, with fair judgment/insight. Pt denied current si/hi/ah/vh. Pt stated she slept \"good\" last night and was \"feeling better.\" Pt did not voice any further concerns. Pt cont to meet criteria for inpt stay for further stabilization through meds management.     LOS:  2  Expected LOS: 5-7 days    Insurance info/prescription coverage:  [facility] [facility]   Date of last family contact:  pt provided verbal dinorah to speak to grandmother/Analia Felipe ([phone]), reached out, and provided update on clinical presentation. She stated \"for the last month, she has been saying that she has been feeling guilty, I don't 'know about what, she thinks people are talking about her, she has been depressed, does not want to be around other people, I don't know what her thoughts are, I don't know if something happened, I dont know what triggered it, like she had a mental breakdown.\" Grandmother presented to be supportive and stated she would like cb with any updates. No further concerns voiced.      Family requesting physician contact today:  No  Discharge plan:  to stabilize pt  Guns in the home:  No   Outpatient provider(s):  [facility]    Participating treatment team members: Kindra Ross, * (assigned SW), Sriram Gregorio, MS  
Client is quiet and withdrawn.Alert and oriented x 3.She reports sleeping well last night.She refused breakfast this morning.Denies pain or discomfort.Remains on close observation for safety.  
DISCHARGE SUMMARY    NAME:Kindra Ross  : 1995  MRN: [id number]    The patient Kindra Ross exhibits the ability to control behavior in a less restrictive environment.  Patient's level of functioning is improving.  No assaultive/destructive behavior has been observed for the past 24 hours.  No suicidal/homicidal threat or behavior has been observed for the past 24 hours.  There is no evidence of serious medication side effects.  Patient has not been in physical or protective restraints for at least the past 24 hours.    If weapons involved, how are they secured? N/a    Is patient aware of and in agreement with discharge plan? yes    Arrangements for medication:  Prescriptions will be sent to pharmacy on file    Copy of discharge instructions to provider?:  yes    Arrangements for transportation home:  Pt will take a [employer] taxi home    Keep all follow up appointments as scheduled, continue to take prescribed medications per physician instructions.  Mental health crisis number:  911 or your local mental health crisis line number at 988      Mental Health Emergency WARM LINE      [phone]-MHAV ([phone])      M-F: 9am to 9pm      Sat & Sun: 5pm - 9pm  National suicide prevention lines:                             4-382-IDEXVWB (8-468-162-2963)       8-773-434-TALK (2-250-034-7554)    Crisis Text Line:  Text HOME to [phone]  
DISCHARGE SUMMARY    NAME:Kindra Ross  : 1995  MRN: [id number]    The patient Kindra Ross exhibits the ability to control behavior in a less restrictive environment.  Patient's level of functioning is improving.  No assaultive/destructive behavior has been observed for the past 24 hours.  No suicidal/homicidal threat or behavior has been observed for the past 24 hours.  There is no evidence of serious medication side effects.  Patient has not been in physical or protective restraints for at least the past 24 hours.    If weapons involved, how are they secured? N/a    Is patient aware of and in agreement with discharge plan? yes    Arrangements for medication:  Prescriptions will be sent to pharmacy on file    Copy of discharge instructions to provider?:  yes    Arrangements for transportation home:  Pt will take a [employer] taxi home    Keep all follow up appointments as scheduled, continue to take prescribed medications per physician instructions.  Mental health crisis number:  911 or your local mental health crisis line number at 988      Mental Health Emergency WARM LINE      2-317-396-MHAV (6428)      M-F: 9am to 9pm      Sat & Sun: 5pm - 9pm  National suicide prevention lines:                             1-746-BDCDXZB (8-583-341-9886)       2-463-680-TALK ([phone])    Crisis Text Line:  Text HOME to 595734  
DISCHARGE SUMMARY    NAME:Kindra Ross  : 1995  MRN: [id number]    The patient Kindra Ross exhibits the ability to control behavior in a less restrictive environment.  Patient's level of functioning is improving.  No assaultive/destructive behavior has been observed for the past 24 hours.  No suicidal/homicidal threat or behavior has been observed for the past 24 hours.  There is no evidence of serious medication side effects.  Patient has not been in physical or protective restraints for at least the past 24 hours.    If weapons involved, how are they secured? N/a    Is patient aware of and in agreement with discharge plan? yes    Arrangements for medication:  Prescriptions see chart    Copy of discharge instructions to provider?:  yes    Arrangements for transportation home:  cab    Keep all follow up appointments as scheduled, continue to take prescribed medications per physician instructions.  Mental health crisis number:  988    Mental Health Emergency WARM LINE      [phone]-MHAV (6428)      M-F: 9am to 9pm      Sat & Sun: 5pm - 9pm  National suicide prevention lines:                             2-359-ZXAORPE (9-245-654-2303)       0-090-748-TALK ([phone])    Crisis Text Line:  Text HOME to [phone]  
DISCHARGE SUMMARY    NAME:Kindra Ross  : 1995  MRN: [id number]    The patient Kindra Ross exhibits the ability to control behavior in a less restrictive environment.  Patient's level of functioning is improving.  No assaultive/destructive behavior has been observed for the past 24 hours.  No suicidal/homicidal threat or behavior has been observed for the past 24 hours.  There is no evidence of serious medication side effects.  Patient has not been in physical or protective restraints for at least the past 24 hours.    If weapons involved, how are they secured? N/a    Is patient aware of and in agreement with discharge plan? yes    Arrangements for medication:  Prescriptions see chart    Copy of discharge instructions to provider?:  yes    Arrangements for transportation home:  cab     Keep all follow up appointments as scheduled, continue to take prescribed medications per physician instructions.  Mental health crisis number:  988    Mental Health Emergency WARM LINE      [phone]-MHAV (6428)      M-F: 9am to 9pm      Sat & Sun: 5pm - 9pm  National suicide prevention lines:                             6-686-QJSMASM (8-529-123-2396)       5-015-779-TALK ([phone])    Crisis Text Line:  Text HOME to [phone]  
PSA PART II ADDITIONAL INFORMATION        Access To Fire Arms: No    Substance Use: No    Last Use: n/a    Type of Substance: no substance use    Frequency of Use: n/a    Request to See : Yes    If yes, notified : Yes    Guardian:No    Guardian Contact:pt is her own legal guardian     Release of Information Signed: No    Release of Information Signed For: pt denied     Goal: \"to connect with myself and the lord\"  
PSYCHOSOCIAL ASSESSMENT  :Patient identifying info:   Kindra Ross is a 30 y.o., female admitted 3/29/2025  3:39 AM     Presenting problem and precipitating factors: Pt was voluntarily admitted to [facility] due to reports of paranoia, feelings like something is wrong and intrusive thoughts. Pt reported in the ED that she people can hear her thoughts, so she does not like to go out in public. Pt denied any SI/HI/avh or substance use. Pt has been admitted to [facility] in the past and is connected with psychiatry but feels like nothing is working. Pt is currently taking Lexapro  and has a good support system.     Pt reported that she recently opened up with her sister about something traumatic but would not share with the writer. She would speak in third person with herself and her ego. She said \"I was a bad person, self-fish, spoiled and always talked about about myself and I want to change and be more connected with myself and the lord\".    Mental status assessment: Pt presented with a euphoric, pleasant affect and congruent mood. Pt denied any SI/HI/avh at the time and was orientated x4. Pt appeared to be disassociating or internally pre-occupied. At times she was laugh inappropriately. Her thoughts were vague, circumstantial and she was focused on Mu-ism. Her speech and tone would alter at times with different accents and pitches. Pt did not appear to have a cognitive or memory impairment but did report disassociation at times where she can not leave the home. She has fair/pre-occupied insight and fair judgement     Strengths/Recreation/Coping Skills:Reading and praying     Collateral information: Analia Felipe ([phone]) pt denied ZORAIDA but stated that she will reach out to grandmother and give her the code to talk with us.    Current psychiatric /substance abuse providers and contact info: Pt receives services with [facility]    Previous psychiatric/substance abuse providers and response to treatment: Pt reported hx of 
Patient A&OX4. Remain isolative to room.Cooperative with assessment. Flat affect.  Denied any anx/dep/SI/HI/ AVH or pain. Did not come out of her room for evening snack. Did not mention anything about being two weeks pregnant or what she plans on doing with the baby.   Sat up and read from a book for half of the shift. Requested trazodone with nighttime medications. Remains on q 15 minutes checks for safety.   
Patient pleasant and polite upon approach.  Smiles appropriately with good eye contact.  Isolative to room most of shift, sitting quietly reading her Koran.  Up briefly to use the phone and get hygiene products.  Reports her anxiety and depression hasn't been bothering her today.  Denies SI, HI, or AVH.  She jokingly said \"only my voice\".  Later, appeared to be talking to herself, but she reports she is saying her prayers.  Accepted scheduled medications without difficulty.  No reported side effects.  Trazodone requested for sleep. Tylenol requested for headache pain 10/10.   Patient currently resting quietly in bed, respirations even and unlabored. Treatment plan ongoing.  Remains on close observation every 15 minutes for patient safety.      
Pt shared that she will start going to groups after she is arcenio on medications. She also requested to put her food to the side, so she can eat at 1900. Nurses were notified   
Pt.has been discharged to home with all personal belongings,pt.has follow up appointment and prescriptions to be picked up the patient.escorted off unit via staff to be transported via [employer].  
Pt.is in her room crying out loudly says she worries about her children,guarded,didn't tell me what was going on with her ,kept saying she's alright, pt.offered prn and she refused,no concerns voiced,remains on close observation.  
Pt.is in her room most of the time,resting quietly, denies feeling suicidal, mood remains depressed,denies hallucinations,insight and judgement is limited, suspicious, paranoid,not attending groups, observed frequently reading her [employer] book,no other concerns voiced. Remains on close observation.  
Pt.is lying down in bed, isolates to room, denies feeling suicidal or depressed, denies hallucinations,accepted breakfast,pt.is pleasant upon approach.no concerns voiced,remains on close observation.  
Pt.is up and visible on unit,affect is flat, appetite good, appearance is in Lutheran attire,denies feeling suicidal ,mood is depressed, pt.states she is fasting and doesn't eat until after sundown, insight and judgement is limited. Paranoid,suspicious, thought blocking observed, no other concerns voiced,remains on close observation.  
Second OQ done.  
applicable  Was education for substance/alcohol abuse added to discharge instructions? Not applicable    Patient discharged to Home/Self Care. Discharge information provided to the patient/caregiver either in hard copy or electronically.

## 2025-04-02 NOTE — DISCHARGE SUMMARY
PSYCHIATRIC DISCHARGE SUMMARY         IDENTIFICATION:    Patient Name  Kindra Ross   Date of Birth 1995   [facility] [id number]   Medical Record Number  [id number]      Age  30 y.o.   PCP No primary care provider on file.   Admit date:  3/29/2025    Discharge date: 4/2/2025   Room Number  246/01  @ [facility]   Date of Service  4/2/2025            TYPE OF DISCHARGE: REGULAR               CONDITION AT DISCHARGE: Stable       PROVISIONAL & DISCHARGE DIAGNOSES:    Psychosis unspecified  Mood disorder unspecified     CC & HISTORY OF PRESENT ILLNESS:    CC: Paranoia, extreme anxiety, hypomanic, euphoric, repeating \"glory to God\", believing that people is watching her and can read her thoughts, unable to keep her personal hygiene, unable to care for herself or her children.     Patient initially walked into triage and repeating \"Glory to my God\" on repeat while clutching 2 books and her prayer rug.       HISTORY OF PRESENT ILLNESS:      The patient, Kindra Ross, is a 30 y.o. female admitted to the behavioral health floor after patient presents to the emergency department feeling paranoid, extreme anxiety, repeating \"glory to God\", believing that someone is watching her and read her mind and thoughts.  Patient has expressed \"I came to get checked into a mental mosher\".  Patient presented with disturbing behavior, euphoric, elated mood, rapid pressured speech and circumstantial thinking and restless.  She also has expressed having intrusive thoughts that make me paranoid.  She has expressed \"I have been dealing with this all my life, I have changed my ways from whom I used to be so I am doing this is my last resort and it is the last time that I am going to be at the hospital if it does not work nothing will work\".  She has also expressed that \"what I am going through people act as if it is unheard of\" she expresses \"I do not feel normal\" patient presents with racing thoughts pressured speech feels

## 2025-04-02 NOTE — GROUP NOTE
Group Therapy Note    Date: 4/2/2025    Group Start Time: 1410  Group End Time: 1500  Group Topic: Process Group - Inpatient    [facility] 2 [facility]    Lily Campos        Group Therapy Note  Process group was focused on self-care and goals. Pts interacted well with one another and provided positive feedback to others. Writer asked \"what are you most excited for when you discharge\" as an icebreaker and passed out a handout.  Attendees: 3-10       Patient's Goal:  To attend group    Notes:  Pt was encouraged to attend and chose not to   Discipline Responsible: /Counselor      Signature:  Lily Campos

## 2025-04-02 NOTE — GROUP NOTE
Group Therapy Note    Date: 4/1/2025    Group Start Time: 1845  Group End Time: 1930  Group Topic: Recreational    SSR 2 BH NON ACUTE    Randi Du        Group Therapy Note    Attendees: 2/4      Recreational Therapist facilitated structured leisure skills group to introduce healthy leisure skills as positive way to cope and manage mood             Notes: Did not attend group despite encouragement    Discipline Responsible: Recreational Therapist      Signature:  CASTILLO Bahena

## 2025-04-08 ENCOUNTER — HOSPITAL ENCOUNTER (OUTPATIENT)
Facility: HOSPITAL | Age: 30
Setting detail: RECURRING SERIES
Discharge: HOME | End: 2025-04-11
Payer: COMMERCIAL

## 2025-04-08 VITALS
TEMPERATURE: 97.4 F | OXYGEN SATURATION: 100 % | DIASTOLIC BLOOD PRESSURE: 76 MMHG | SYSTOLIC BLOOD PRESSURE: 128 MMHG | HEART RATE: 86 BPM

## 2025-04-08 PROCEDURE — 90853 GROUP PSYCHOTHERAPY: CPT

## 2025-04-08 ASSESSMENT — ANXIETY QUESTIONNAIRES
2. NOT BEING ABLE TO STOP OR CONTROL WORRYING: SEVERAL DAYS
1. FEELING NERVOUS, ANXIOUS, OR ON EDGE: NOT AT ALL
6. BECOMING EASILY ANNOYED OR IRRITABLE: MORE THAN HALF THE DAYS
3. WORRYING TOO MUCH ABOUT DIFFERENT THINGS: SEVERAL DAYS
5. BEING SO RESTLESS THAT IT IS HARD TO SIT STILL: SEVERAL DAYS
4. TROUBLE RELAXING: SEVERAL DAYS
GAD7 TOTAL SCORE: 9
7. FEELING AFRAID AS IF SOMETHING AWFUL MIGHT HAPPEN: NEARLY EVERY DAY

## 2025-04-08 ASSESSMENT — PATIENT HEALTH QUESTIONNAIRE - PHQ9
2. FEELING DOWN, DEPRESSED OR HOPELESS: SEVERAL DAYS
SUM OF ALL RESPONSES TO PHQ QUESTIONS 1-9: 2
1. LITTLE INTEREST OR PLEASURE IN DOING THINGS: SEVERAL DAYS
SUM OF ALL RESPONSES TO PHQ QUESTIONS 1-9: 2

## 2025-04-08 NOTE — GROUP NOTE
Group Therapy Note    Date: 4/8/2025    Group Start Time:  1:10 PM  Group End Time:  2:00 PM  Group Topic: Psychoeducation    [facility]    Latoya Raines MSW        Group Therapy Note    Patients engaged in a back-to-back style drawing activity. Patients were asked to volunteer to describe an image for peers to draw. Patients were encouraged to ask clarifying questions and to use communication skills. Patients were asked to reflect on skills used. Patients discussed reflections on their healing and were encouraged to offer feedback and support to peers.    Attendees: 4       Patient's Goal:  Use effective communication skills, engage in group activity, reflect on relationships    Notes:  Pt engaged in psychoeducation group. Pt engaged in group drawing activity. Pt asked clarifying questions to peers. Pt asked peers for feedback relating to letting go of the past. Pt was receptive to feedback and support from peers. Pt identified difficulties and challenges with mental health journey. Pt appeared to speak softly under her breath throughout group. Pt will continue to practice self-disclosing and using communication skills.    Status After Intervention:  Unchanged    Participation Level: Active Listener and Interactive    Participation Quality: Appropriate, Attentive, and Sharing      Speech:  normal      Thought Process/Content: Logical  Linear      Affective Functioning: Congruent      Mood: euthymic      Level of consciousness:  Alert, Oriented x4, Attentive, and Preoccupied      Response to Learning: Able to verbalize current knowledge/experience, Able to verbalize/acknowledge new learning, Capable of insight, and Progressing to goal      Endings: None Reported    Modes of Intervention: Education and Activity      Discipline Responsible: /Counselor      Signature:  JONATHAN Rangel

## 2025-04-08 NOTE — BH NOTE
OP Behavioral Health Intake Packet    OUTPATIENT INTAKE PACKET    DEMOGRAPHICS  Kindra Ross   1995  [unfilled]   [phone]   [id number]    4/8/2025  10:49 AM  Information Source: Self   30 y.o.  Accompanied by/Method of Arrival: Transportation arranged through Medicaid     Marital Status: Single    Emergency Contact: Analia Felipe Phone: [phone]    No Known Allergies     [] Guardian [] POA [] Psychiatric Advance Directive     Name: N/A  Language if not English: English     [x] Reads [x] Writes      REPRODUCTION/SEXUALITY  Sexual Preference/Orientation: Heterosexual   Patient's Gender: female   Patient's Gender Identity: Female   Pronoun Preference: She/Her    PSYCHIATRIC CARE HISTORY    Last Inpatient Treatment: 03/29/2025- 04/02/2025 @ [facility]     Last Seen: [x] Psychiatrist Name: Debra Quinones MD  Date: 03/29/2025 During Inpatient Stay     Intent to Follow [] Yes [x] No      [x] Therapist Name: Ms Wellington  Therapist w/ [facility] [phone]  Date: 04/07/2025     Intent to Follow [x] Yes [] No    CURRENT/PREVIOUS TREATMENT HISTORY     Diagnosis: F29  Psychosis unspecified &  F39 Mood disorder unspecified (Provisional Dx)     REASON FOR REFERRAL    Chief Complaint (patient's own words): \"My mental health is affecting my way of being\".  Pt reports always having very pessimistic thoughts from baseline, tends to self soothe with repeating 123 or what colors she currently see's in front of her at that moment. She reports sxs of paranoia and  being uncomfortable within her body exacerbated 3 mos ago.However she was not able to elaborate fully on these feelings of uncomfortableness. She reports feeling like people are aware of her negative thought process and tends to withdraw and self isolate, \"to give people space and clear state a mind from being around her due to her mental illness\". She endorses being rude and arrogant in the past to friends and family and attributes her current

## 2025-04-08 NOTE — SUICIDE SAFETY PLAN
SAFETY PLAN    A suicide Safety Plan is a document that supports someone when they are having thoughts of suicide.    Warning Signs that indicate a suicidal crisis may be developing: What (situations, thoughts, feelings, body sensations, behaviors, etc.) do you experience that lets you know you are beginning to think about suicide?  1.Self Isolate and withdrawn   2. Self neglect and low mood   3. Heaviness and despressive sxs     Internal Coping Strategies:  What things can I do (relaxation techniques, hobbies, physical activities, etc.) to take my mind off my problems without contacting another person?  1. Pt declined identified interest at this time  2. N/A  3. N/A    People and social settings that provide distraction: Who can I call or where can I go to distract me?  1. Name: Analia Felipe- grandmother  Phone: [phone]  2. Name: Nancy Russell -mother  Phone: [phone]   3. Place: Grandmother's house             4. Place: N/A    People whom I can ask for help: Who can I call when I need help - for example, friends, family, clergy, someone else?  1. Name: Analia Felipe                 Phone: [phone]  2. Name: Nancy Russell   Phone: [phone]  3. Name: N/A  Phone: N/A    Professionals or Behavioral Health agencies I can contact during a crisis: Who can I call for help - for example, my doctor, my psychiatrist, my psychologist, a mental health provider, a suicide hotline?  1. Clinician Name: MsBeckie Paris- Therapist [facility]  Phone: [phone]      Clinician Pager or Emergency Contact #: [phone]    2. Clinician Name: Jeancarlos Waller LCSW    Phone: [phone]      Clinician Pager or Emergency Contact #: [phone] Behavioral Health Access     3. Suicide Prevention Lifeline: 0-665-415-AIQL (8660)    4. Local Behavioral Health Emergency Services -  for example, [facility], [facility] suicide hotline, [facility] Hotline: [facility]

## 2025-04-08 NOTE — GROUP NOTE
Group Therapy Note    Date: 4/8/2025    Group Start Time: 12:00 PM  Group End Time:  1:00 PM  Group Topic: Psychotherapy    [facility]    Luci Fiore, JONATHAN        Group Therapy Note    Attendees: 5    Writer facilitated psychotherapy group centered around family roles this afternoon. Writer opened with introductions to build rapport with a new peer who joined group. Writer asked patients to introduce themselves and answer an icebreaker question. Writer then moved into the topic, provided a worksheet for patients and asked them to identify traits that their families have. Writer facilitated reflection on the activity, then verbally reviewed information on family roles before prompting discussion and reflection about their own families, which continued until the end of group.        Patient's Goal:  Participate in group therapy, build insight and understanding about family dynamics and roles, increase self awareness about the roles they play and how it may facilitate dysfunction within the family unit.     Notes:  Patient engaged well in group this afternoon. She introduced herself to the group and participated well in the icebreaker. Patient presented as internally pre-occupied, struggling with eye contact and speaking to herself quietly. She did provide good verbal support for a peer who was sharing, and demonstrated some insight in her reflections. Patient will continue with identified treatment goals in further groups.     Status After Intervention:  Unchanged    Participation Level: Active Listener and Interactive    Participation Quality: Attentive and Supportive      Speech:  normal      Thought Process/Content: Logical      Affective Functioning: Incongruent      Mood: euthymic      Level of consciousness:  Attentive and Preoccupied      Response to Learning: Able to verbalize current knowledge/experience, Capable of insight, and Progressing to

## 2025-04-08 NOTE — BH NOTE
Goal: Intake Assessment for PHP      met with patient for intake assessment. Pt denied SI/HI upon assessment. Pt signed ZORAIDA for mother. SW did not complete COWS, Audit-C or CIWA due to pt denying alcohol and opiate use.    Jeancarlos Waller LCSW

## 2025-04-08 NOTE — H&P
nodes  []Immunosuppression  []Recent steroid use  []Other:      Details for Pertinent Positives: n/a    Mental Status Exam Findings: This is a conservatively dressed  female wearing a hijab. Her eye contact is averted. Speech is quiet. She appears internally preoccupied. TP is disorganized at times. TC negative for active SI or HI. Endorses AVH--voice telling her negative things about her self but appears to realize this \"is my voice, not a real one.\"    Impression/Initial Diagnosis: MDD recurrent severe with psychosis, r/o bipolar disorder    Course of Action/Treatment Plan: Admit to PHP and encourage participation in groups and programming. Meet with provider twice weekly for medication management. Assess for safety routinely. Gather additional information as able.    Brittany Stallworth MD  4/8/2025  12:58 PM

## 2025-04-09 ENCOUNTER — HOSPITAL ENCOUNTER (OUTPATIENT)
Facility: HOSPITAL | Age: 30
Setting detail: RECURRING SERIES
Discharge: HOME | End: 2025-04-12
Payer: COMMERCIAL

## 2025-04-09 VITALS
TEMPERATURE: 98 F | DIASTOLIC BLOOD PRESSURE: 79 MMHG | HEART RATE: 75 BPM | OXYGEN SATURATION: 97 % | SYSTOLIC BLOOD PRESSURE: 130 MMHG

## 2025-04-09 PROCEDURE — 90834 PSYTX W PT 45 MINUTES: CPT

## 2025-04-09 PROCEDURE — 90853 GROUP PSYCHOTHERAPY: CPT

## 2025-04-09 NOTE — BH NOTE
Partial Hospitalization Program Individual Psychotherapy Note      Diagnosis:  F29  Psychosis unspecified &  F39 Mood disorder unspecified      Goal: Review ITP and Individual          Psychotherapy Session     Start time: 1:15  Stop time: 2:18         Patient Mental Status and Mood/Affect:Appropriate and Anxious / Depressed      Patient Behavior and Appearance: Cooperative and Good eye contact with normal speech, shows no evidence of impairment     Intervention/Techniques: Validated/Supported, Reflected, Reframed, Clarified, and Evaluated/Interpreted     Focus of Session/Patient Response and Progress Towards Goal: Pt was pulled for an individual session to review treatment plan. The focus of the session was to identify progress made while in treatment, and explore any barriers. Pt was engaged evidenced by her participation in reviewing the goals and exploring further her needs while in treatment.      Narrative: Writer started the session by reviewing treatment plan goals and checking in with the patient. The patient presented tearful and agitated, exhibiting rocking behavior, and expressed pervasive feelings of unworthiness, stating, 'I'm not a good person.' She initially declined to elaborate, citing trust issues and a sense of betrayal. Subsequently, she verbalized feelings of profound guilt and shame, questioning her deservingness of forgiveness and linked these feelings to the suicide of her two youngest children's father, which she holds herself responsible. She also provided further details regarding her current relationship with the father of her unborn child then disclosed a desire to terminate her current pregnancy due to the conflictual family dynamics it will cause. Following therapeutic intervention, patient exhibited increased emotional processing, articulating a subjective decrease in

## 2025-04-09 NOTE — BH NOTE
10:15am As advised by Latoya CASTILLO, writer checked on patient, who was sitting alone in small group room. Patient was observed to be tearful. Writer asked patient if she is okay, and patient stated she is okay, but wants to be by herself. Writer entered the room, and patient began repeating faster and louder that she wanted to be by herself. Writer asked patient why, and she stated it was because of her thoughts, and she feels better when she is by herself. Writer asked if this is because the thoughts stop when she is alone, or because they bother her less. Patient stated they bother her less. Writer asked if patient felt that she would be able to attend groups at all today. She stated \"I don't want to\". Writer expressed to patient that given that this is a therapy program, writer wants to make sure that patient is getting the benefits of being in treatment, rather than spending her time doing something that is not helping her at all. Patient indicated understanding by nodding. Writer stated goal of checking back on patient in a little while, and she nodded again and stated \"please\". Writer offered water or a snack to patient, which she declined.     JONATHAN Go

## 2025-04-09 NOTE — BH NOTE
Signed ZORAIDA recv'd. Collateral contact call placed to Pts grandmother x2 . Writer left  requesting return call    .Jeancarlos Waller LCSW

## 2025-04-09 NOTE — GROUP NOTE
Group Therapy Note    Date: 4/9/2025    Group Start Time: 12:00 PM  Group End Time:  1:00 PM  Group Topic: Psychoeducation    [facility]    Hailey Street LCSW        Group Therapy Note  Writer facilitated a group focusing on expressions of emotions and communication. Pts started group by engaging in emotion charades and explored the importance of effective identification and expression of emotions. Pts then explore the different things that are needed for effective communication and barriers to communication.   Attendees: 6         Patient's Goal:  Pt will continue to increase understanding of effective communication of thoughts and feelings.     Notes: Pt showed minimal engagement in group evidenced by not engaging in the emotions charades or the group conversation about effective communication. Pt was prompted to participate but declined. Pt walked out of group toward the end and staff checked on her. Pt will continue to increase understanding of effective communication of thoughts and feelings.     Status After Intervention:  Unchanged    Participation Level: None    Participation Quality: n/a      Speech:  n/a      Thought Process/Content: n/a      Affective Functioning: Blunted      Mood: depressed      Level of consciousness:  Oriented x4      Response to Learning: n/a      Endings: None Reported    Modes of Intervention: Exploration and Clarifying      Discipline Responsible: /Counselor      Signature:  Hailey Street LCSW

## 2025-04-09 NOTE — BH NOTE
During vitals, pt appeared distracted and easily startled. When asked if she was okay, pt reported \"no\" and asked if there was a private room she could go to to be alone. Writer informed pt that there was and asked pt what was going on. Pt reported she just wanted to be in a room by herself. Writer finished taking vitals. Pt repeated to herself very softly \"wanting to be by myself\" repetitively. Writer showed pt to empty group room and informed pt that staff would check in on her in a few minutes.    Writer checked on pt at 9:47am, asking pt for lunch ticket to be completed for the day. Pt sat by the window with her hands blocking her face from view. Writer asked pt what was going on and pt reported wanting to be by herself. Writer asked for potential reasons pt wants to be by herself right now. Pt reported being unsure and repeated wanting to be by herself. Writer asked pt if anything happened between yesterday and today and pt reported nothing happening. Writer asked if pt was feeling any different than yesterday, pt reported she felt differently, wanting to be by herself with no one around. Writer encouraged pt to join process group to share her experiences with peers. Pt declined.    Writer checked on pt at 10:30am due to hearing pt sobbing loudly. Pt reported being okay, and continuing to tell herself that she was okay, but that she wanting to be by herself. Writer asked pt of she wanted tissues, pt declinedd. Writer reflected feelings to pt, and pt confirmed having a lot on her mind. Writer reflected past goals shared by pt of learning things in group and addressing her thought patterns. Writer informed pt that the next group was related to addressing thought patterns and encouraged pt to join group. Pt agreed. Writer agreed to let pt know when group was starting back up.    10:42am Writer informed pt that next group was starting, and pt agreed to attend, joining group shortly thereafter.    Latoya Raines,

## 2025-04-09 NOTE — GROUP NOTE
Group Therapy Note    Date: 4/9/2025    Group Start Time: 10:40 AM  Group End Time: 11:30 AM  Group Topic: Cognitive Skills    [facility]    Latoya Raines MSW        Group Therapy Note    Patients were given educational materials with examples of distorted thoughts or beliefs. Patients were asked to identify thoughts and beliefs they identify with. Patients were asked to challenge or reframe thoughts to be more kind, realistic, or aligned with goals. Patients were encouraged to offer feedback and support to peers.    Attendees: 5       Patient's Goal:  Identify thoughts, challenge thoughts, identify coping skills    Notes:  Pt engaged in cognitive skills group. Pt identified having strong negative thought patterns. Pt declined to elaborate on specific thoughts, identifying \"the worst things you could think\" as her thoughts. Pt identified her thoughts as being untrue and more intrusive in nature. Pt appeared preoccupied, whispering to herself and gesturing spontaneously throughout group. Pt will continue to practice identifying and challenging thoughts.    Status After Intervention:  Unchanged    Participation Level: Active Listener and Interactive    Participation Quality: Appropriate, Attentive, and Sharing      Speech:  soft      Thought Process/Content: Logical  Linear      Affective Functioning: Congruent      Mood: dysphoric, depressed      Level of consciousness:  Alert, Oriented x4, Attentive, and Preoccupied      Response to Learning: Able to verbalize current knowledge/experience and Progressing to goal      Endings: None Reported    Modes of Intervention: Education and Exploration      Discipline Responsible: /Counselor      Signature:  JONATHAN Rangel

## 2025-04-09 NOTE — CARE COORDINATION
Milieu therapy and support   Frequency Daily   Measured by Self report   Staff Responsible Clinical staff   Intervention 2 Assess safety   Frequency Daily   Measured by Self report;Staff observation   Staff Responsible Clinical staff   Intervention 3 Indvidual therapy   Frequency 1 x weekly   Measured by Self report   Staff Responsible Clinical staff   STG Goal 2 Status: Patient Appears to be  Treatment plan goal is unmet at this time  (4/9 Goal Created.  Patient has difficulty talking about  when they are experiencing negative intrusive thoughts.\")   Crisis/Safety/Discharge Plan   Crisis/Safety Plan Standard program interventions and protocol   Comprehensive Assessment Completion Date 04/09/25   Discharge Plan Dischagrge Date tentatively set for 5-2. Pt will resume care with outpatient providers for medication management and weekly psychotherapy     .Jeancarlos Waller LCSW

## 2025-04-09 NOTE — GROUP NOTE
Group Therapy Note    Date: 4/9/2025    Group Start Time:  2:00 PM  Group End Time:  2:45 PM  Group Topic: Wrap-Up    [facility]    Hailey Street LCSW        Group Therapy Note  Writer facilitated a community wrap up group focused on assessing for safety, coping skills practice, and planning ways to engage in healthy coping while in the community. Writer facilitated a symptom and safety check in using the afternoon check in form. Pts then engaged in a sensory walk, shared their thoughts with the group, and explored different things they can do to increase self-care or asked of kindness.   Attendees: 6       Patient's Goal:  Pt will continue to practice disclosing safety concerns and planning for safety in the community.      Notes: Pt showed minimal engagement in group. Pt was meeting with her individual therapist during the walk but she was able to complete her check in sheet. Pt did not engage in the group discussion of things to do to increase self-care. Pt will continue to practice disclosing safety concerns and planning for safety in the community.     Status After Intervention:  Unchanged    Participation Level: None    Participation Quality: n/a      Speech:  n/a      Thought Process/Content: Hallucinating/ patient was noted talking to herself during this group.       Affective Functioning: Blunted      Mood:  sad      Level of consciousness:  Preoccupied      Response to Learning: n/a      Endings: None Reported    Modes of Intervention: Support, Exploration, and Activity      Discipline Responsible: /Counselor      Signature:  Hailey Street LCSW

## 2025-04-11 ENCOUNTER — HOSPITAL ENCOUNTER (OUTPATIENT)
Facility: HOSPITAL | Age: 30
Setting detail: RECURRING SERIES
Discharge: HOME | End: 2025-04-14
Payer: COMMERCIAL

## 2025-04-11 VITALS
OXYGEN SATURATION: 99 % | SYSTOLIC BLOOD PRESSURE: 129 MMHG | DIASTOLIC BLOOD PRESSURE: 77 MMHG | HEART RATE: 76 BPM | TEMPERATURE: 98.1 F

## 2025-04-11 PROCEDURE — 90853 GROUP PSYCHOTHERAPY: CPT

## 2025-04-11 NOTE — DISCHARGE SUMMARY
PARTIAL HOSPITALIZATION PROGRAM DISCHARGE SUMMARY    Kindra Ross  30 y.o.  1995  [id number]  [phone]        Admission Date: 4/8/2025  Discharge Date: 4/11/2025    Admission Diagnosis (DSM 5): F29  Psychosis unspecified &  F39 Mood disorder unspecified          Discharge Diagnosis (DSM 5): F29  Psychosis unspecified &  F39 Mood disorder unspecified       Status at Last Contact: Euthymic and Cooperative      Date and Time of Last Contact/Attempted Contact: 4/11/2025 Pt. In attendance for tx     Guardian Contacted: N/A    Reason for Admission (Summary): Chief Complaint (patient's own words): \"My mental health is affecting my way of being\".  Pt reports always having very pessimistic thoughts from baseline, tends to self soothe with repeating 123 or what colors she currently see's in front of her at that moment. She reports sxs of paranoia and  being uncomfortable within her body exacerbated 3 mos ago.However she was not able to elaborate fully on these feelings of uncomfortableness. She reports feeling like people are aware of her negative thought process and tends to withdraw and self isolate, \"to give people space and clear state a mind from being around her due to her mental illness\". She endorses being rude and arrogant in the past to friends and family and attributes her current sxs to her past actions. She is not able to forgive her self and doesn't feel she is deserving of love and forgiveness from her family and support system and the people she has hurt. She reports having a good relationships with her parents  both who are local but communication is limited. She identifies a strong support system within her grandmother with whom she resides with currently along with her 4 children, ages 13, 8, 5 & 2 since 2022. Pt reports recently converting to practice [employer] in hopes it would offer a fresh start and a path to healing and forgiveness through sincere repentance and following the teachings of the

## 2025-04-11 NOTE — BH NOTE
Writer spoke with patient after speaking with Latoya JONATHAN at 10:45am regarding patient not wanting to participate in groups. Writer reflected to patient that she had told Latoya JONATHAN about not wanting to participate in any groups today/not wanting to be around people. Writer gently explained that the primary mode of treatment here is group therapy, and that if that is not the right kind of treatment for pt at the moment, it's okay and she can be set up with more appropriate services and discharged. Patient verbalized agreement with this. Writer asked patient to join  in office, which she agreed to. Writer discussed community based services with patient, specifically through [facility] on recommendation from Terrie PÉREZ. Writer explained that because it's community based, staff/clinician will come to patient instead of her having to go out into the community. Patient verbalized understanding and expressed feeling that this would be a better option for her. She expressed a wish to get an EEG, as well as her belief that that will help her know what is wrong with her brain. Writer provided gentle explanation about imaging and how it can be helpful, but typically does not provide the full picture, which is why staff recommends therapeutic services as well. Writer expressed that staff can provide resources for full neuropsych testing, and patient verbalized understanding and agreement. Writer explained to patient that staff will work on setting up resources for her, and that she is welcome to stay in the treatment area or go home while staff works on this. Patient stated that she would stay, if she could stay in the small group room. Writer confirmed she could, and patient left junir's office and went to the small group room. Writer started referral to [facility] and will hand off discharge process to patient's individual therapist, Jeancarlos PÉREZ.     JONATHAN Go

## 2025-04-11 NOTE — GROUP NOTE
Group Therapy Note    Date: 4/11/2025    Group Start Time:  9:40 AM  Group End Time: 10:30 AM  Group Topic: Process Group - Outpatient    [facility]    Hailey Street LCSW        Group Therapy Note  Writer facilitated process group. Writer encouraged pts to share issues on which they want support with problem solving, issues they need to process with peers, or events they want to celebrate. Writer prompted and supported peer feedback. Writer provided validation and feedback as well as utilized open ended questions to support further exploration of topics.   Attendees: 5       Patient's Goal:  Pt will continue to practice engaging in healthy processing.     Notes:  Pt engaged in group evidenced by spontaneous participation. Pt listened to her peers as they shared and was able to provide personal examples of ways that she manages her anxiety when sitting in her room at home. Pt was noted to be responding to internal stimulation throughout the group. Pt will continue to practice engaging in healthy processing.     Status After Intervention:  Unchanged    Participation Level: Active Listener    Participation Quality: Attentive      Speech:  normal      Thought Process/Content: Logical      Affective Functioning: Blunted      Mood: anxious      Level of consciousness:  Preoccupied      Response to Learning: Able to verbalize current knowledge/experience      Endings: None Reported    Modes of Intervention: Support and Exploration      Discipline Responsible: /Counselor      Signature:  Hailey Street LCSW

## 2025-04-11 NOTE — CARE COORDINATION
04/11/25 1523   ITP   Date of Plan 04/09/25   Date of Next Review 04/11/25  (No further review warranted Pt discharged from tx on 4/11/25)   Primary Diagnosis Code F29  Psychosis unspecified &  F39 Mood disorder unspecified   Barriers to Treatment Need for psychoeducation;Transportation;Psychiatric symptom (comment)   Strengths Incorporated in Plan Acknowledging need for assistance   Plan of Care   Long Term Goal (LTG) Stated in patient/guardian terms \"The ability to control my thoughts and emotions\"   Short Term Goal 1   Short Term Goal 1 Client will learn and demonstrate effective coping skills   Baseline Functioning Patient reports hx of maladaptive coping skills such as self isolation and unintentional self neglect and poor personal hygiene routine   Target Learn and implement coping skills that will result in reduction of depressive sxs and improved daily functioning   Objectives Client will participate in group therapy  (Client will participate in individual therapy; Client will participate in group therapy;)   Measured by Self report   Intervention 2 Monitor medications   Measured by Self report   Intervention 3 Indvidual therapy   Measured by Self report   STG Goal 1 Status: Patient Appears to be  Treatment plan goal is unmet at this time  (4/11 Pt. deemed inappropriate for group therapy tx. Tx plan closed d/t Discharge from PHP on 4/11/25)   Short Term Goal 2   Short Term Goal 2 Other (comment)  (Patient will be oriented to program and staff and participate in the therapeutic process)   Baseline Functioning The patient demonstrates impaired ability to recognize and delineate negative intrusive thoughts from feelings and emotions   Target Patient will be able to label and identify thoughts and use words to express her feelings, wants and needs   Objectives Client will participate in group therapy   Intervention  Milieu therapy and support   Measured by Self report   Intervention 2 Assess safety   Measured

## 2025-04-14 ENCOUNTER — APPOINTMENT (OUTPATIENT)
Facility: HOSPITAL | Age: 30
End: 2025-04-14
Payer: COMMERCIAL

## 2025-04-14 NOTE — BH NOTE
Writer attempted to leave voicemail to follow up on referral for outpatient services. VM full. Follow up email sent to the following:       Francie Grey, MPC, MSP, LMHP-R    [email or web address]    \"Nevaeh Untested is Powerless\"- Dr. Lugo    [facility]  \"[facility]\"  [address] [place] [address]  Office: [phone]  Fax: [phone]  Company Cell: [phone]     .Jeancarlos Waller LCSW

## 2025-04-14 NOTE — BH NOTE
Writer brijesh the following email correspondence from [facility]:     The case was referred back to our [place] location because the client lived in [place]. On Friday, the client was contacted and requested a call back, we provided a call back and the client did not respond. We will be trying again today to make contact and begin services. Let me know if you have any updates as well. Thank you for the referral, we greatly appreciate it.     .Jeancarlos Waller LCSW

## 2025-04-15 ENCOUNTER — APPOINTMENT (OUTPATIENT)
Facility: HOSPITAL | Age: 30
End: 2025-04-15
Payer: COMMERCIAL

## 2025-04-16 ENCOUNTER — APPOINTMENT (OUTPATIENT)
Facility: HOSPITAL | Age: 30
End: 2025-04-16
Payer: COMMERCIAL

## 2025-04-17 ENCOUNTER — APPOINTMENT (OUTPATIENT)
Facility: HOSPITAL | Age: 30
End: 2025-04-17
Payer: COMMERCIAL

## 2025-04-18 ENCOUNTER — APPOINTMENT (OUTPATIENT)
Facility: HOSPITAL | Age: 30
End: 2025-04-18
Payer: COMMERCIAL

## 2025-04-21 ENCOUNTER — APPOINTMENT (OUTPATIENT)
Facility: HOSPITAL | Age: 30
End: 2025-04-21
Payer: COMMERCIAL

## 2025-04-22 ENCOUNTER — APPOINTMENT (OUTPATIENT)
Facility: HOSPITAL | Age: 30
End: 2025-04-22
Payer: COMMERCIAL

## 2025-04-23 ENCOUNTER — APPOINTMENT (OUTPATIENT)
Facility: HOSPITAL | Age: 30
End: 2025-04-23
Payer: COMMERCIAL

## 2025-04-24 ENCOUNTER — APPOINTMENT (OUTPATIENT)
Facility: HOSPITAL | Age: 30
End: 2025-04-24
Payer: COMMERCIAL

## 2025-04-25 ENCOUNTER — APPOINTMENT (OUTPATIENT)
Facility: HOSPITAL | Age: 30
End: 2025-04-25
Payer: COMMERCIAL

## 2025-04-28 ENCOUNTER — APPOINTMENT (OUTPATIENT)
Facility: HOSPITAL | Age: 30
End: 2025-04-28
Payer: COMMERCIAL

## 2025-04-29 ENCOUNTER — APPOINTMENT (OUTPATIENT)
Facility: HOSPITAL | Age: 30
End: 2025-04-29
Payer: COMMERCIAL

## 2025-04-30 ENCOUNTER — APPOINTMENT (OUTPATIENT)
Facility: HOSPITAL | Age: 30
End: 2025-04-30
Payer: COMMERCIAL

## 2025-08-07 ENCOUNTER — HOSPITAL ENCOUNTER (EMERGENCY)
Facility: HOSPITAL | Age: 30
Discharge: HOME | End: 2025-08-07
Payer: COMMERCIAL

## 2025-08-07 VITALS
BODY MASS INDEX: 35.38 KG/M2 | SYSTOLIC BLOOD PRESSURE: 154 MMHG | RESPIRATION RATE: 18 BRPM | WEIGHT: 187.39 LBS | HEART RATE: 78 BPM | TEMPERATURE: 98 F | HEIGHT: 61 IN | OXYGEN SATURATION: 98 % | DIASTOLIC BLOOD PRESSURE: 95 MMHG

## 2025-08-07 DIAGNOSIS — B96.89 BACTERIAL VAGINOSIS: Primary | ICD-10-CM

## 2025-08-07 DIAGNOSIS — N76.0 BACTERIAL VAGINOSIS: Primary | ICD-10-CM

## 2025-08-07 DIAGNOSIS — R03.0 ELEVATED BLOOD PRESSURE READING: ICD-10-CM

## 2025-08-07 LAB
APPEARANCE UR: CLEAR
BACTERIA URNS QL MICRO: NEGATIVE /HPF
BILIRUB UR QL: NEGATIVE
CLUE CELLS VAG QL WET PREP: ABNORMAL
COLOR UR: ABNORMAL
EPITH CASTS URNS QL MICRO: ABNORMAL /LPF
GLUCOSE UR STRIP.AUTO-MCNC: NEGATIVE MG/DL
HCG UR QL: NEGATIVE
HGB UR QL STRIP: NEGATIVE
KETONES UR QL STRIP.AUTO: NEGATIVE MG/DL
LEUKOCYTE ESTERASE UR QL STRIP.AUTO: NEGATIVE
NITRITE UR QL STRIP.AUTO: NEGATIVE
PH UR STRIP: 5.5 (ref 5–8)
PROT UR STRIP-MCNC: 30 MG/DL
RBC #/AREA URNS HPF: ABNORMAL /HPF (ref 0–5)
SP GR UR REFRACTOMETRY: 1.02
T VAGINALIS VAG QL WET PREP: ABNORMAL
URINE CULTURE IF INDICATED: ABNORMAL
UROBILINOGEN UR QL STRIP.AUTO: 1 EU/DL (ref 0.2–1)
WBC URNS QL MICRO: ABNORMAL /HPF (ref 0–4)
YEAST WET PREP: ABNORMAL

## 2025-08-07 PROCEDURE — 87210 SMEAR WET MOUNT SALINE/INK: CPT

## 2025-08-07 PROCEDURE — 99283 EMERGENCY DEPT VISIT LOW MDM: CPT

## 2025-08-07 PROCEDURE — 81001 URINALYSIS AUTO W/SCOPE: CPT

## 2025-08-07 PROCEDURE — 87591 N.GONORRHOEAE DNA AMP PROB: CPT

## 2025-08-07 PROCEDURE — 81025 URINE PREGNANCY TEST: CPT

## 2025-08-07 PROCEDURE — 87491 CHLMYD TRACH DNA AMP PROBE: CPT

## 2025-08-07 RX ORDER — METRONIDAZOLE 500 MG/1
500 TABLET ORAL 2 TIMES DAILY
Qty: 14 TABLET | Refills: 0 | Status: SHIPPED | OUTPATIENT
Start: 2025-08-07 | End: 2025-08-14

## 2025-08-07 ASSESSMENT — PAIN - FUNCTIONAL ASSESSMENT: PAIN_FUNCTIONAL_ASSESSMENT: 0-10

## 2025-08-07 ASSESSMENT — VISUAL ACUITY: OU: 1

## 2025-08-07 ASSESSMENT — PAIN SCALES - GENERAL: PAINLEVEL_OUTOF10: 0
